# Patient Record
Sex: FEMALE | Race: WHITE | Employment: UNEMPLOYED
[De-identification: names, ages, dates, MRNs, and addresses within clinical notes are randomized per-mention and may not be internally consistent; named-entity substitution may affect disease eponyms.]

---

## 2017-01-26 ENCOUNTER — TELEPHONE (OUTPATIENT)
Dept: FAMILY MEDICINE CLINIC | Facility: CLINIC | Age: 47
End: 2017-01-26

## 2017-02-16 ENCOUNTER — OFFICE VISIT (OUTPATIENT)
Dept: FAMILY MEDICINE CLINIC | Facility: CLINIC | Age: 47
End: 2017-02-16

## 2017-02-16 VITALS
SYSTOLIC BLOOD PRESSURE: 140 MMHG | HEART RATE: 86 BPM | HEIGHT: 63 IN | OXYGEN SATURATION: 97 % | WEIGHT: 169 LBS | BODY MASS INDEX: 29.95 KG/M2 | DIASTOLIC BLOOD PRESSURE: 80 MMHG

## 2017-02-16 DIAGNOSIS — R20.2 NUMBNESS AND TINGLING IN BOTH HANDS: Primary | ICD-10-CM

## 2017-02-16 DIAGNOSIS — R20.0 NUMBNESS AND TINGLING IN BOTH HANDS: Primary | ICD-10-CM

## 2017-02-16 PROCEDURE — 99214 OFFICE O/P EST MOD 30 MIN: CPT | Performed by: FAMILY MEDICINE

## 2017-03-08 LAB
AVERAGE GLUCOSE: NORMAL
HBA1C MFR BLD: 6.21 %

## 2017-03-09 ENCOUNTER — TELEPHONE (OUTPATIENT)
Dept: FAMILY MEDICINE CLINIC | Facility: CLINIC | Age: 47
End: 2017-03-09

## 2017-03-10 DIAGNOSIS — R53.83 FATIGUE, UNSPECIFIED TYPE: Primary | ICD-10-CM

## 2017-03-10 DIAGNOSIS — E66.9 OBESITY (BMI 30-39.9): ICD-10-CM

## 2017-03-10 DIAGNOSIS — R74.8 ELEVATED LIVER ENZYMES: ICD-10-CM

## 2017-03-10 DIAGNOSIS — E11.9 TYPE 2 DIABETES MELLITUS WITHOUT COMPLICATION, UNSPECIFIED LONG TERM INSULIN USE STATUS: ICD-10-CM

## 2017-03-13 LAB
BASOPHILS ABSOLUTE: NORMAL /ΜL
BASOPHILS RELATIVE PERCENT: NORMAL %
EOSINOPHILS ABSOLUTE: NORMAL /ΜL
EOSINOPHILS RELATIVE PERCENT: NORMAL %
HCT VFR BLD CALC: NORMAL % (ref 36–46)
HEMOGLOBIN: NORMAL G/DL (ref 12–16)
LYMPHOCYTES ABSOLUTE: NORMAL /ΜL
LYMPHOCYTES RELATIVE PERCENT: NORMAL %
MCH RBC QN AUTO: NORMAL PG
MCHC RBC AUTO-ENTMCNC: NORMAL G/DL
MCV RBC AUTO: NORMAL FL
MONOCYTES ABSOLUTE: NORMAL /ΜL
MONOCYTES RELATIVE PERCENT: NORMAL %
NEUTROPHILS ABSOLUTE: NORMAL /ΜL
NEUTROPHILS RELATIVE PERCENT: NORMAL %
PLATELET # BLD: NORMAL K/ΜL
PMV BLD AUTO: NORMAL FL
RBC # BLD: NORMAL 10^6/ΜL
WBC # BLD: NORMAL 10^3/ML

## 2017-03-14 ENCOUNTER — TELEPHONE (OUTPATIENT)
Dept: FAMILY MEDICINE CLINIC | Age: 47
End: 2017-03-14

## 2017-03-15 DIAGNOSIS — D50.9 MICROCYTIC HYPOCHROMIC ANEMIA: Primary | ICD-10-CM

## 2017-03-15 RX ORDER — DIAZEPAM 5 MG/1
5 TABLET ORAL ONCE
Qty: 2 TABLET | Refills: 0 | Status: SHIPPED | OUTPATIENT
Start: 2017-03-15 | End: 2017-03-15

## 2017-03-15 RX ORDER — AMOXICILLIN 250 MG/1
250 CAPSULE ORAL 3 TIMES DAILY
Qty: 30 CAPSULE | Refills: 0 | Status: SHIPPED | OUTPATIENT
Start: 2017-03-15 | End: 2017-03-25

## 2017-03-15 RX ORDER — LANOLIN ALCOHOL/MO/W.PET/CERES
325 CREAM (GRAM) TOPICAL 2 TIMES DAILY
Qty: 90 TABLET | Refills: 3 | Status: SHIPPED | OUTPATIENT
Start: 2017-03-15 | End: 2019-05-22 | Stop reason: ALTCHOICE

## 2017-03-15 RX ORDER — AMOXICILLIN 250 MG
2 CAPSULE ORAL DAILY PRN
Qty: 30 TABLET | Refills: 1 | Status: SHIPPED | OUTPATIENT
Start: 2017-03-15 | End: 2017-08-15 | Stop reason: ALTCHOICE

## 2017-03-17 ENCOUNTER — TELEPHONE (OUTPATIENT)
Dept: FAMILY MEDICINE CLINIC | Age: 47
End: 2017-03-17

## 2017-03-20 ENCOUNTER — TELEPHONE (OUTPATIENT)
Dept: FAMILY MEDICINE CLINIC | Age: 47
End: 2017-03-20

## 2017-03-20 DIAGNOSIS — R19.5 POSITIVE FIT (FECAL IMMUNOCHEMICAL TEST): Primary | ICD-10-CM

## 2017-03-28 ENCOUNTER — HOSPITAL ENCOUNTER (OUTPATIENT)
Dept: MRI IMAGING | Facility: CLINIC | Age: 47
Discharge: HOME | End: 2017-03-28
Payer: COMMERCIAL

## 2017-03-28 DIAGNOSIS — R20.2 NUMBNESS AND TINGLING IN BOTH HANDS: ICD-10-CM

## 2017-03-28 DIAGNOSIS — R20.0 NUMBNESS AND TINGLING IN BOTH HANDS: ICD-10-CM

## 2017-03-28 PROCEDURE — 72141 MRI NECK SPINE W/O DYE: CPT

## 2017-03-30 ENCOUNTER — TELEPHONE (OUTPATIENT)
Dept: FAMILY MEDICINE CLINIC | Age: 47
End: 2017-03-30

## 2017-04-10 DIAGNOSIS — D50.0 IRON DEFICIENCY ANEMIA DUE TO CHRONIC BLOOD LOSS: Primary | ICD-10-CM

## 2017-05-12 ENCOUNTER — OFFICE VISIT (OUTPATIENT)
Dept: FAMILY MEDICINE CLINIC | Age: 47
End: 2017-05-12
Payer: COMMERCIAL

## 2017-05-12 VITALS
RESPIRATION RATE: 16 BRPM | SYSTOLIC BLOOD PRESSURE: 110 MMHG | BODY MASS INDEX: 29.77 KG/M2 | OXYGEN SATURATION: 98 % | HEART RATE: 79 BPM | WEIGHT: 168 LBS | HEIGHT: 63 IN | DIASTOLIC BLOOD PRESSURE: 71 MMHG

## 2017-05-12 DIAGNOSIS — R05.8 COUGH WITH SPUTUM: Primary | ICD-10-CM

## 2017-05-12 PROCEDURE — 99213 OFFICE O/P EST LOW 20 MIN: CPT | Performed by: FAMILY MEDICINE

## 2017-05-12 RX ORDER — RANOLAZINE 1000 MG/1
1000 TABLET, EXTENDED RELEASE ORAL
COMMUNITY

## 2017-05-12 RX ORDER — LEVOFLOXACIN 500 MG/1
500 TABLET, FILM COATED ORAL DAILY
Qty: 10 TABLET | Refills: 0 | Status: SHIPPED | OUTPATIENT
Start: 2017-05-12 | End: 2017-05-22

## 2017-05-12 ASSESSMENT — PATIENT HEALTH QUESTIONNAIRE - PHQ9
SUM OF ALL RESPONSES TO PHQ9 QUESTIONS 1 & 2: 2
1. LITTLE INTEREST OR PLEASURE IN DOING THINGS: 1
2. FEELING DOWN, DEPRESSED OR HOPELESS: 1
SUM OF ALL RESPONSES TO PHQ QUESTIONS 1-9: 2

## 2017-06-14 LAB
AVERAGE GLUCOSE: NORMAL
HBA1C MFR BLD: 6.4 %

## 2017-06-23 ENCOUNTER — HOSPITAL ENCOUNTER (OUTPATIENT)
Age: 47
Setting detail: SPECIMEN
Discharge: HOME | End: 2017-06-23
Payer: COMMERCIAL

## 2017-06-23 DIAGNOSIS — D50.0 IRON DEFICIENCY ANEMIA DUE TO CHRONIC BLOOD LOSS: ICD-10-CM

## 2017-06-23 LAB
HCT VFR BLD CALC: 30.6 % (ref 36–46)
HEMOGLOBIN: 10.1 G/DL (ref 12–16)

## 2017-08-08 ENCOUNTER — HOSPITAL ENCOUNTER (OUTPATIENT)
Dept: MRI IMAGING | Facility: CLINIC | Age: 47
Discharge: HOME | End: 2017-08-08

## 2017-08-08 DIAGNOSIS — M51.26 DISPLACEMENT OF LUMBAR INTERVERTEBRAL DISC WITHOUT MYELOPATHY: ICD-10-CM

## 2017-08-08 PROCEDURE — 72148 MRI LUMBAR SPINE W/O DYE: CPT

## 2017-08-15 ENCOUNTER — OFFICE VISIT (OUTPATIENT)
Dept: FAMILY MEDICINE CLINIC | Age: 47
End: 2017-08-15
Payer: COMMERCIAL

## 2017-08-15 VITALS
OXYGEN SATURATION: 98 % | WEIGHT: 169 LBS | HEART RATE: 79 BPM | DIASTOLIC BLOOD PRESSURE: 76 MMHG | BODY MASS INDEX: 29.95 KG/M2 | HEIGHT: 63 IN | SYSTOLIC BLOOD PRESSURE: 123 MMHG

## 2017-08-15 DIAGNOSIS — J01.90 ACUTE SINUSITIS, RECURRENCE NOT SPECIFIED, UNSPECIFIED LOCATION: ICD-10-CM

## 2017-08-15 DIAGNOSIS — L60.8 CHANGE IN NAIL APPEARANCE: Primary | ICD-10-CM

## 2017-08-15 PROCEDURE — 99213 OFFICE O/P EST LOW 20 MIN: CPT | Performed by: FAMILY MEDICINE

## 2017-08-15 RX ORDER — AZITHROMYCIN 250 MG/1
TABLET, FILM COATED ORAL
Qty: 6 TABLET | Refills: 0 | Status: SHIPPED | OUTPATIENT
Start: 2017-08-15 | End: 2017-12-13 | Stop reason: SDUPTHER

## 2017-08-15 RX ORDER — .ALPHA.-TOCOPHEROL ACETATE, DL-, CHOLECALCIFEROL, CYANOCOBALAMIN, FERROUS FUMARATE, FOLIC ACID, NIACINAMIDE, SODIUM ASCORBATE, ASCORBIC ACID, PYRIDOXINE HYDROCHLORIDE, RIBOFLAVIN, AND THIAMINE MONONITRATE 11; 400; 12; 29; 1; 20; 60; 60; 10; 3; 2 [IU]/1; [IU]/1; UG/1; MG/1; MG/1; MG/1; MG/1; MG/1; MG/1; MG/1; MG/1
1 TABLET, CHEWABLE ORAL DAILY
Qty: 30 TABLET | Refills: 3 | Status: SHIPPED | OUTPATIENT
Start: 2017-08-15 | End: 2018-11-20 | Stop reason: ALTCHOICE

## 2017-10-25 DIAGNOSIS — I10 ESSENTIAL HYPERTENSION: ICD-10-CM

## 2017-10-25 RX ORDER — METOPROLOL SUCCINATE 25 MG/1
50 TABLET, EXTENDED RELEASE ORAL DAILY
Qty: 60 TABLET | Refills: 3 | Status: SHIPPED | OUTPATIENT
Start: 2017-10-25

## 2017-12-12 ENCOUNTER — TELEPHONE (OUTPATIENT)
Dept: FAMILY MEDICINE CLINIC | Age: 47
End: 2017-12-12

## 2017-12-12 NOTE — TELEPHONE ENCOUNTER
Pt called in requesting a script be sent in for sinus infection. Has Sinus drainage green and yellow in color. Pain around eyes. HA constantly. Slight temperature/fever last night. Both kids also have right now and are on ABx. Has tried OTC decongestants. Wanted you to be sure you know she is on Ranexa for heart and can only be on certain abx.   [facility] on [employer]

## 2017-12-13 DIAGNOSIS — J01.90 ACUTE SINUSITIS, RECURRENCE NOT SPECIFIED, UNSPECIFIED LOCATION: ICD-10-CM

## 2017-12-13 RX ORDER — AZITHROMYCIN 250 MG/1
TABLET, FILM COATED ORAL
Qty: 6 TABLET | Refills: 0 | Status: SHIPPED | OUTPATIENT
Start: 2017-12-13 | End: 2017-12-23

## 2018-01-11 ENCOUNTER — HOSPITAL ENCOUNTER (OUTPATIENT)
Age: 48
Setting detail: SPECIMEN
Discharge: HOME | End: 2018-01-11
Payer: COMMERCIAL

## 2018-01-16 LAB — SURGICAL PATHOLOGY REPORT: NORMAL

## 2018-02-21 ENCOUNTER — OFFICE VISIT (OUTPATIENT)
Dept: FAMILY MEDICINE CLINIC | Age: 48
End: 2018-02-21
Payer: COMMERCIAL

## 2018-02-21 VITALS — RESPIRATION RATE: 16 BRPM | BODY MASS INDEX: 31.54 KG/M2 | WEIGHT: 178 LBS

## 2018-02-21 DIAGNOSIS — G57.01 PIRIFORMIS SYNDROME, RIGHT: Primary | ICD-10-CM

## 2018-02-21 PROCEDURE — 99213 OFFICE O/P EST LOW 20 MIN: CPT | Performed by: FAMILY MEDICINE

## 2018-02-21 RX ORDER — CYCLOBENZAPRINE HCL 5 MG
5 TABLET ORAL 3 TIMES DAILY PRN
Qty: 30 TABLET | Refills: 0 | Status: SHIPPED | OUTPATIENT
Start: 2018-02-21 | End: 2018-03-03

## 2018-02-21 RX ORDER — HYDROCODONE BITARTRATE AND ACETAMINOPHEN 5; 325 MG/1; MG/1
1 TABLET ORAL 2 TIMES DAILY
Qty: 14 TABLET | Refills: 0 | Status: SHIPPED | OUTPATIENT
Start: 2018-02-21 | End: 2018-02-28

## 2018-02-21 RX ORDER — PANTOPRAZOLE SODIUM 40 MG/1
40 GRANULE, DELAYED RELEASE ORAL DAILY
COMMUNITY

## 2018-02-21 NOTE — PATIENT INSTRUCTIONS
Patient Education        Piriformis Syndrome: Care Instructions  Your Care Instructions    The piriformis muscle is deep under your rear end (buttock). One end of the muscle connects deep inside the pelvic area, and the other end attaches to the top of the thighbone. This muscle can press on the sciatic nerve that runs from your spine down your leg. When this happens, you may have pain, numbness, and tingling in the buttock and down the back of your leg. This is called piriformis syndrome. The pain may get worse when you sit for a long time or climb stairs. Also, you may be more likely to develop piriformis syndrome if you run or walk often. Your doctor will check for other causes of your pain before treating this syndrome. Treatment may include stretching exercises, massage, and medicine for the pain and swelling. If these do not help, you may get a shot of steroid medicine. Until the pain is gone, you may need to rest the muscle and limit activities like running. Exercises and a change in how you move and sit may be enough to stop the pressure on the nerve. Follow-up care is a key part of your treatment and safety. Be sure to make and go to all appointments, and call your doctor if you are having problems. It's also a good idea to know your test results and keep a list of the medicines you take. How can you care for yourself at home? · If your doctor thinks that strenuous exercise is causing your problem, stop or cut back on activities such as running. You may find swimming to be a good exercise for a while. · Stretch the piriformis muscle. Susan Yusuf on your back. ¨ Bend one leg at the knee and keep the other leg flat on the ground. ¨ Raise your bent knee up and then move it across your body. Hold the outside of the knee with the opposite hand. ¨ Gently pull the knee with your hand toward the opposite shoulder. ¨ Hold the stretch for at least 15 to 30 seconds. Switch legs.   ¨ Do the stretch several times

## 2018-02-21 NOTE — PROGRESS NOTES
General FM note    Sidney Connell is a 56 y.o. female who presents today for follow up on her  medical conditions as noted below. Sidney Connell is c/o of   Chief Complaint   Patient presents with    Hip Pain     right hip pain x 1 month. gotten worse.  Hoarse     hoarse voice, slight sore throat x 3-4 days       Patient Active Problem List:     Fatigue     GERD (gastroesophageal reflux disease)     Obesity (BMI 30-39. 9)     Diabetes mellitus ([address]     Snoring     Elevated liver enzymes     Unstable angina (HCC)     Positive cardiac stress test     Anomalous coronary artery origin     Past Medical History:   Diagnosis Date    Anomalous coronary artery origin 2016    Anxiety     Asthma     Back pain     Diabetes mellitus ([address]     Mitral prolapse     Thoracic outlet syndrome       Past Surgical History:   Procedure Laterality Date    ABDOMEN SURGERY      BACK SURGERY       SECTION      x 2    HERNIA REPAIR      TUBAL LIGATION       No family history on file. Current Outpatient Prescriptions   Medication Sig Dispense Refill    pantoprazole sodium (PROTONIX) 40 MG PACK packet Take 40 mg by mouth daily      cyclobenzaprine (FLEXERIL) 5 MG tablet Take 1 tablet by mouth 3 times daily as needed for Muscle spasms 30 tablet 0    HYDROcodone-acetaminophen (NORCO) 5-325 MG per tablet Take 1 tablet by mouth 2 times daily for 7 days . Take lowest dose possible to manage pain.  14 tablet 0    metoprolol succinate (TOPROL XL) 25 MG extended release tablet Take 2 tablets by mouth daily 60 tablet 3    PRENATA 29-1 MG CHEW Take 1 each by mouth daily 30 tablet 3    ranolazine (RANEXA) 1000 MG extended release tablet Take 1,000 mg by mouth      fluticasone-salmeterol (ADVAIR DISKUS) 500-50 MCG/DOSE diskus inhaler Inhale 1 puff into the lungs every 12 hours 60 each 3    ferrous sulfate (FE TABS) 325 (65 FE) MG EC tablet Take 1 tablet by mouth 2 times daily 90 tablet 3    insulin glargine (TOUJEO questions to her and her 's satisfaction. The possible upper respiratory infection was not discussed. Return in about 6 months (around 8/21/2018), or if symptoms worsen or fail to improve, for [facility]. No orders of the defined types were placed in this encounter. Orders Placed This Encounter   Medications    cyclobenzaprine (FLEXERIL) 5 MG tablet     Sig: Take 1 tablet by mouth 3 times daily as needed for Muscle spasms     Dispense:  30 tablet     Refill:  0    HYDROcodone-acetaminophen (NORCO) 5-325 MG per tablet     Sig: Take 1 tablet by mouth 2 times daily for 7 days . Take lowest dose possible to manage pain.      Dispense:  14 tablet     Refill:  0       Electronically signed by Emma Guerra MD on 2/22/2018 at 6:55 AM       (Please note that portions of this note were completed with a voice recognition program. Efforts were made to edit the dictations but occasionally words are mis-transcribed.)

## 2018-02-26 ENCOUNTER — HOSPITAL ENCOUNTER (OUTPATIENT)
Age: 48
Setting detail: SPECIMEN
Discharge: HOME | End: 2018-02-26
Payer: COMMERCIAL

## 2018-03-01 LAB — SURGICAL PATHOLOGY REPORT: NORMAL

## 2018-03-14 ENCOUNTER — OFFICE VISIT (OUTPATIENT)
Dept: FAMILY MEDICINE CLINIC | Age: 48
End: 2018-03-14
Payer: COMMERCIAL

## 2018-03-14 VITALS
DIASTOLIC BLOOD PRESSURE: 78 MMHG | WEIGHT: 179 LBS | SYSTOLIC BLOOD PRESSURE: 129 MMHG | BODY MASS INDEX: 31.71 KG/M2 | HEART RATE: 73 BPM | HEIGHT: 63 IN

## 2018-03-14 DIAGNOSIS — M25.50 ARTHRALGIA, UNSPECIFIED JOINT: ICD-10-CM

## 2018-03-14 DIAGNOSIS — G57.32 PERONEAL NERVE PALSY, LEFT: Primary | ICD-10-CM

## 2018-03-14 PROCEDURE — 99213 OFFICE O/P EST LOW 20 MIN: CPT | Performed by: FAMILY MEDICINE

## 2018-03-14 RX ORDER — AMOXICILLIN 250 MG/1
CAPSULE ORAL
COMMUNITY
Start: 2018-03-05 | End: 2018-11-20 | Stop reason: ALTCHOICE

## 2018-03-14 NOTE — PROGRESS NOTES
General FM note    Brina Hutchison is a 56 y.o. female who presents today for follow up on her  medical conditions as noted below. Brina Hutchison is c/o of   Chief Complaint   Patient presents with    Numbness     Left foot between 1st and 2nd metatarsal and up the top.  Pain     Experiencing more back pain since having the foot numbness and left hip pain. Patient Active Problem List:     Fatigue     GERD (gastroesophageal reflux disease)     Obesity (BMI 30-39. 9)     Diabetes mellitus ([address]     Snoring     Elevated liver enzymes     Unstable angina (HCC)     Positive cardiac stress test     Anomalous coronary artery origin     Past Medical History:   Diagnosis Date    Anomalous coronary artery origin 2016    Anxiety     Asthma     Back pain     Diabetes mellitus ([address]     Mitral prolapse     Thoracic outlet syndrome       Past Surgical History:   Procedure Laterality Date    ABDOMEN SURGERY      BACK SURGERY       SECTION      x 2    HERNIA REPAIR      TUBAL LIGATION       History reviewed. No pertinent family history.   Current Outpatient Prescriptions   Medication Sig Dispense Refill    pantoprazole sodium (PROTONIX) 40 MG PACK packet Take 40 mg by mouth daily      metoprolol succinate (TOPROL XL) 25 MG extended release tablet Take 2 tablets by mouth daily 60 tablet 3    PRENATA 29-1 MG CHEW Take 1 each by mouth daily 30 tablet 3    ranolazine (RANEXA) 1000 MG extended release tablet Take 1,000 mg by mouth      fluticasone-salmeterol (ADVAIR DISKUS) 500-50 MCG/DOSE diskus inhaler Inhale 1 puff into the lungs every 12 hours 60 each 3    ferrous sulfate (FE TABS) 325 (65 FE) MG EC tablet Take 1 tablet by mouth 2 times daily 90 tablet 3    insulin glargine (TOUJEO SOLOSTAR) 300 UNIT/ML injection pen Inject 20 Units into the skin daily       nitroGLYCERIN (NITROSTAT) 0.4 MG SL tablet Place 0.4 mg under the tongue every 5 minutes as needed for Chest pain Dissolve 1 tab under anticipated. I have reviewed the instructions with the patient, answering all questions to her satisfaction. Return in about 6 months (around 9/14/2018), or if symptoms worsen or fail to improve. Orders Placed This Encounter   Procedures    Lupus Anticoagulant     Standing Status:   Future     Standing Expiration Date:   3/14/2019   Lia Rosenberg MD, Neurology [place]     Referral Priority:   Routine     Referral Type:   Consult for Advice and Opinion     Referral Reason:   Specialty Services Required     Referred to Provider:   Deb Santiago MD     Requested Specialty:   Neurology     Number of Visits Requested:   1    [facility] MD     Referral Priority:   Routine     Referral Type:   Consult for Advice and Opinion     Referral Reason:   Specialty Services Required     Referred to Provider:   Lynn Peres MD     Requested Specialty:   Rheumatology     Number of Visits Requested:   1     No orders of the defined types were placed in this encounter.       Electronically signed by Isabel Duenas MD on 3/15/2018 at 6:30 AM       (Please note that portions of this note were completed with a voice recognition program. Efforts were made to edit the dictations but occasionally words are mis-transcribed.)

## 2018-04-19 ENCOUNTER — HOSPITAL ENCOUNTER (EMERGENCY)
Facility: CLINIC | Age: 48
Discharge: HOME | End: 2018-04-19
Attending: EMERGENCY MEDICINE
Payer: COMMERCIAL

## 2018-04-19 VITALS
HEIGHT: 63 IN | SYSTOLIC BLOOD PRESSURE: 150 MMHG | DIASTOLIC BLOOD PRESSURE: 104 MMHG | RESPIRATION RATE: 16 BRPM | BODY MASS INDEX: 31.18 KG/M2 | WEIGHT: 176 LBS | HEART RATE: 86 BPM | TEMPERATURE: 98.1 F | OXYGEN SATURATION: 98 %

## 2018-04-19 DIAGNOSIS — K12.1 ORAL ULCER: Primary | ICD-10-CM

## 2018-04-19 LAB
ABSOLUTE EOS #: 0.3 K/UL (ref 0–0.4)
ABSOLUTE IMMATURE GRANULOCYTE: ABNORMAL K/UL (ref 0–0.3)
ABSOLUTE LYMPH #: 2 K/UL (ref 1–4.8)
ABSOLUTE MONO #: 0.5 K/UL (ref 0.1–1.2)
ANION GAP SERPL CALCULATED.3IONS-SCNC: 13 MMOL/L (ref 9–17)
BASOPHILS # BLD: 1 % (ref 0–2)
BASOPHILS ABSOLUTE: 0.1 K/UL (ref 0–0.2)
BUN BLDV-MCNC: 6 MG/DL (ref 6–20)
BUN/CREAT BLD: ABNORMAL (ref 9–20)
CALCIUM SERPL-MCNC: 9.5 MG/DL (ref 8.6–10.4)
CHLORIDE BLD-SCNC: 102 MMOL/L (ref 98–107)
CO2: 24 MMOL/L (ref 20–31)
CREAT SERPL-MCNC: 0.7 MG/DL (ref 0.5–0.9)
DIFFERENTIAL TYPE: ABNORMAL
EOSINOPHILS RELATIVE PERCENT: 4 % (ref 1–4)
GFR AFRICAN AMERICAN: >60 ML/MIN
GFR NON-AFRICAN AMERICAN: >60 ML/MIN
GFR SERPL CREATININE-BSD FRML MDRD: ABNORMAL ML/MIN/{1.73_M2}
GFR SERPL CREATININE-BSD FRML MDRD: ABNORMAL ML/MIN/{1.73_M2}
GLUCOSE BLD-MCNC: 113 MG/DL (ref 70–99)
HCT VFR BLD CALC: 39.4 % (ref 36–46)
HEMOGLOBIN: 13 G/DL (ref 12–16)
IMMATURE GRANULOCYTES: ABNORMAL %
INR BLD: 1
LYMPHOCYTES # BLD: 34 % (ref 24–44)
MCH RBC QN AUTO: 28.1 PG (ref 26–34)
MCHC RBC AUTO-ENTMCNC: 32.8 G/DL (ref 31–37)
MCV RBC AUTO: 85.5 FL (ref 80–100)
MONOCYTES # BLD: 8 % (ref 2–11)
NRBC AUTOMATED: ABNORMAL PER 100 WBC
PARTIAL THROMBOPLASTIN TIME: 24.1 SEC (ref 21.3–31.3)
PDW BLD-RTO: 16.9 % (ref 12.5–15.4)
PLATELET # BLD: 140 K/UL (ref 140–450)
PLATELET ESTIMATE: ABNORMAL
PMV BLD AUTO: 8.7 FL (ref 6–12)
POTASSIUM SERPL-SCNC: 4.1 MMOL/L (ref 3.7–5.3)
PROTHROMBIN TIME: 10 SEC (ref 9.4–12.6)
RBC # BLD: 4.61 M/UL (ref 4–5.2)
RBC # BLD: ABNORMAL 10*6/UL
SEG NEUTROPHILS: 53 % (ref 36–66)
SEGMENTED NEUTROPHILS ABSOLUTE COUNT: 3.2 K/UL (ref 1.8–7.7)
SODIUM BLD-SCNC: 139 MMOL/L (ref 135–144)
WBC # BLD: 6 K/UL (ref 3.5–11)
WBC # BLD: ABNORMAL 10*3/UL

## 2018-04-19 PROCEDURE — 99283 EMERGENCY DEPT VISIT LOW MDM: CPT

## 2018-04-19 PROCEDURE — 85025 COMPLETE CBC W/AUTO DIFF WBC: CPT

## 2018-04-19 PROCEDURE — 36415 COLL VENOUS BLD VENIPUNCTURE: CPT

## 2018-04-19 PROCEDURE — 80048 BASIC METABOLIC PNL TOTAL CA: CPT

## 2018-04-19 PROCEDURE — 85730 THROMBOPLASTIN TIME PARTIAL: CPT

## 2018-04-19 PROCEDURE — 85610 PROTHROMBIN TIME: CPT

## 2018-04-19 RX ORDER — LORAZEPAM 2 MG/ML
1 INJECTION INTRAMUSCULAR ONCE
Status: DISCONTINUED | OUTPATIENT
Start: 2018-04-19 | End: 2018-04-19

## 2018-04-19 ASSESSMENT — ENCOUNTER SYMPTOMS
COUGH: 0
ABDOMINAL PAIN: 0
VOMITING: 0
EYE PAIN: 0
DIARRHEA: 0
BACK PAIN: 0
BLOOD IN STOOL: 0
NAUSEA: 0
CONSTIPATION: 0
SHORTNESS OF BREATH: 0

## 2018-05-03 DIAGNOSIS — M25.50 ARTHRALGIA, UNSPECIFIED JOINT: ICD-10-CM

## 2018-05-10 ENCOUNTER — OFFICE VISIT (OUTPATIENT)
Dept: FAMILY MEDICINE CLINIC | Age: 48
End: 2018-05-10
Payer: COMMERCIAL

## 2018-05-10 VITALS
HEART RATE: 83 BPM | WEIGHT: 180.4 LBS | RESPIRATION RATE: 16 BRPM | OXYGEN SATURATION: 95 % | DIASTOLIC BLOOD PRESSURE: 85 MMHG | BODY MASS INDEX: 31.96 KG/M2 | SYSTOLIC BLOOD PRESSURE: 136 MMHG

## 2018-05-10 DIAGNOSIS — F41.9 ANXIETY: Primary | ICD-10-CM

## 2018-05-10 PROCEDURE — 99213 OFFICE O/P EST LOW 20 MIN: CPT | Performed by: FAMILY MEDICINE

## 2018-05-10 RX ORDER — DIAZEPAM 2 MG/1
2 TABLET ORAL NIGHTLY PRN
Qty: 6 TABLET | Refills: 0 | Status: SHIPPED | OUTPATIENT
Start: 2018-05-10 | End: 2018-05-16

## 2018-06-14 ENCOUNTER — OFFICE VISIT (OUTPATIENT)
Dept: FAMILY MEDICINE CLINIC | Age: 48
End: 2018-06-14
Payer: COMMERCIAL

## 2018-06-14 VITALS
OXYGEN SATURATION: 96 % | HEART RATE: 79 BPM | DIASTOLIC BLOOD PRESSURE: 76 MMHG | SYSTOLIC BLOOD PRESSURE: 128 MMHG | WEIGHT: 176 LBS | BODY MASS INDEX: 31.18 KG/M2

## 2018-06-14 DIAGNOSIS — L03.012 PARONYCHIA OF THUMB, LEFT: Primary | ICD-10-CM

## 2018-06-14 DIAGNOSIS — M79.671 RIGHT FOOT PAIN: ICD-10-CM

## 2018-06-14 PROCEDURE — 99213 OFFICE O/P EST LOW 20 MIN: CPT | Performed by: FAMILY MEDICINE

## 2018-06-14 ASSESSMENT — PATIENT HEALTH QUESTIONNAIRE - PHQ9
SUM OF ALL RESPONSES TO PHQ QUESTIONS 1-9: 0
2. FEELING DOWN, DEPRESSED OR HOPELESS: 0
SUM OF ALL RESPONSES TO PHQ9 QUESTIONS 1 & 2: 0
1. LITTLE INTEREST OR PLEASURE IN DOING THINGS: 0

## 2018-06-26 ENCOUNTER — OFFICE VISIT (OUTPATIENT)
Dept: OBGYN CLINIC | Age: 48
End: 2018-06-26
Payer: COMMERCIAL

## 2018-06-26 VITALS
SYSTOLIC BLOOD PRESSURE: 154 MMHG | DIASTOLIC BLOOD PRESSURE: 85 MMHG | HEART RATE: 83 BPM | BODY MASS INDEX: 30.05 KG/M2 | WEIGHT: 176 LBS | HEIGHT: 64 IN

## 2018-06-26 DIAGNOSIS — N93.9 ABNORMAL UTERINE BLEEDING (AUB): Primary | ICD-10-CM

## 2018-06-26 DIAGNOSIS — N92.1 MENORRHAGIA WITH IRREGULAR CYCLE: ICD-10-CM

## 2018-06-26 DIAGNOSIS — Z98.891 HISTORY OF CLASSICAL CESAREAN SECTION: ICD-10-CM

## 2018-06-26 PROCEDURE — 99203 OFFICE O/P NEW LOW 30 MIN: CPT | Performed by: OBSTETRICS & GYNECOLOGY

## 2018-06-26 ASSESSMENT — ENCOUNTER SYMPTOMS
SHORTNESS OF BREATH: 0
BACK PAIN: 0
COUGH: 0
ABDOMINAL PAIN: 0

## 2018-07-03 ENCOUNTER — TELEPHONE (OUTPATIENT)
Dept: FAMILY MEDICINE CLINIC | Age: 48
End: 2018-07-03

## 2018-07-05 ENCOUNTER — NURSE ONLY (OUTPATIENT)
Dept: FAMILY MEDICINE CLINIC | Age: 48
End: 2018-07-05
Payer: COMMERCIAL

## 2018-07-05 DIAGNOSIS — R35.0 URINARY FREQUENCY: Primary | ICD-10-CM

## 2018-07-05 LAB
BILIRUBIN, POC: NEGATIVE
BLOOD URINE, POC: ABNORMAL
CLARITY, POC: CLEAR
COLOR, POC: ABNORMAL
GLUCOSE URINE, POC: NEGATIVE
KETONES, POC: NEGATIVE
LEUKOCYTE EST, POC: ABNORMAL
NITRITE, POC: NEGATIVE
PH, POC: 5
PROTEIN, POC: NEGATIVE
SPECIFIC GRAVITY, POC: 1.03
UROBILINOGEN, POC: 0.2

## 2018-07-05 PROCEDURE — 81002 URINALYSIS NONAUTO W/O SCOPE: CPT | Performed by: FAMILY MEDICINE

## 2018-07-09 DIAGNOSIS — R35.0 URINARY FREQUENCY: ICD-10-CM

## 2018-08-31 LAB
AVERAGE GLUCOSE: NORMAL
HBA1C MFR BLD: 6.7 %

## 2018-09-17 ENCOUNTER — TELEPHONE (OUTPATIENT)
Dept: FAMILY MEDICINE CLINIC | Age: 48
End: 2018-09-17

## 2018-10-22 ENCOUNTER — OFFICE VISIT (OUTPATIENT)
Dept: FAMILY MEDICINE CLINIC | Age: 48
End: 2018-10-22
Payer: COMMERCIAL

## 2018-10-22 VITALS
WEIGHT: 181 LBS | HEIGHT: 64 IN | BODY MASS INDEX: 30.9 KG/M2 | OXYGEN SATURATION: 97 % | RESPIRATION RATE: 14 BRPM | HEART RATE: 75 BPM | DIASTOLIC BLOOD PRESSURE: 76 MMHG | SYSTOLIC BLOOD PRESSURE: 119 MMHG | TEMPERATURE: 97 F

## 2018-10-22 DIAGNOSIS — L73.9 FOLLICULITIS: ICD-10-CM

## 2018-10-22 DIAGNOSIS — K13.0 ANGULAR CHEILITIS: Primary | ICD-10-CM

## 2018-10-22 PROCEDURE — 99214 OFFICE O/P EST MOD 30 MIN: CPT | Performed by: FAMILY MEDICINE

## 2018-10-22 RX ORDER — DOXYCYCLINE HYCLATE 100 MG/1
100 CAPSULE ORAL 2 TIMES DAILY
Qty: 14 CAPSULE | Refills: 0 | Status: SHIPPED | OUTPATIENT
Start: 2018-10-22 | End: 2018-10-29

## 2018-10-22 NOTE — PROGRESS NOTES
General FM note    Anastasia Cope is a 53 y.o. female who presents today for follow up on her  medical conditions as noted below. Anastasia Cope is c/o of   Chief Complaint   Patient presents with   [facility] Other     thinks she has infection on scalp on head very tender and sore     Mouth Lesions     corners of mouth       Patient Active Problem List:     Fatigue     GERD (gastroesophageal reflux disease)     Obesity (BMI 30-39. 9)     Diabetes mellitus ([address]     Snoring     Elevated liver enzymes     Unstable angina (HCC)     Positive cardiac stress test     Anomalous coronary artery origin     Past Medical History:   Diagnosis Date    Anomalous coronary artery origin 2016    Anxiety     Asthma     Back pain     Diabetes mellitus ([address]     Mitral prolapse     Thoracic outlet syndrome       Past Surgical History:   Procedure Laterality Date    ABDOMEN SURGERY      BACK SURGERY       SECTION      x 2    HERNIA REPAIR      TUBAL LIGATION       Family History   Problem Relation Age of Onset    Diabetes Mother     Diabetes Paternal Grandmother      Current Outpatient Prescriptions   Medication Sig Dispense Refill    doxycycline hyclate (VIBRAMYCIN) 100 MG capsule Take 1 capsule by mouth 2 times daily for 7 days 14 capsule 0    SITagliptin (JANUVIA) 25 MG tablet Take 25 mg by mouth daily      pantoprazole sodium (PROTONIX) 40 MG PACK packet Take 40 mg by mouth daily      ranolazine (RANEXA) 1000 MG extended release tablet Take 1,000 mg by mouth      insulin glargine (TOUJEO SOLOSTAR) 300 UNIT/ML injection pen Inject 20 Units into the skin daily       nitroGLYCERIN (NITROSTAT) 0.4 MG SL tablet Place 0.4 mg under the tongue every 5 minutes as needed for Chest pain Dissolve 1 tab under tongue at first sign of chest pain. May repeat every 5 minutes until relief is obtained.  If pain persists after taking 3 tabs in a 15-minute period, or the pain is different than is typically experienced, call 119/76   Pulse 75   Temp 97 °F (36.1 °C) (Oral)   Resp 14   Ht 5' 3.5\" (1.613 m)   Wt 181 lb (82.1 kg)   SpO2 97%   BMI 31.56 kg/m²     Subjective:      HPI     53-year-old female coming today with multiple complaints. She states over last week she started to have a little bump at her right area of the R posterior head. This got more so sore and now she is not even able to sleep on this area and the pain feels like going down her right neck area. she is not really able to move her head. This pain and pressure, but also there was some drainage, when she patrick her hair. Very, very tender. She used some over the counter topical medication which did not help. She also feels some congestion in her ears. She is also concerned about the recurrent infection at. mouth corners. She has been using topical but this infection comes back and she feels very sore at these areas. She also tells me that there is burning some sort of nerve pain at her right lower mouth corner. Review of Systems   Constitutional: Negative for fever and unexpected weight change. Respiratory: Negative for cough and shortness of breath. Cardiovascular: Negative for chest pain and leg swelling. Gastrointestinal: Negative for diarrhea, constipation and blood in stool. Skin: Negative for color change and rash. positive for infection at her scalp area. Positive for infection at mouth corners. Objective:   Physical Exam  Constitutional: VS (see above). General appearance: normal development, habitus and attention, no deformities. No distress. Eyes: normal conjunctiva and lids. CAV: RRR, no RMG. No edema lower extremities. Pulmo: CTA bilateral, no CWR. Skin: no rashes, lesions or ulcers. Physical Exam   HENT:   Head:       Mouth/Throat:       Open sores with slight drainage present. Musculoskeletal: normal gait. Nails: no clubbing or cyanosis. Psychiatric: alert and oriented to place, time and person.  Normal mood and affect. Assessment:       Diagnosis Orders   1. Angular cheilitis  Rober Downey MD, Dermatology [facility]   2. Folliculitis  doxycycline hyclate (VIBRAMYCIN) 100 MG capsule       Plan:   See dermatologist.  Yair Brink start the patient on a antibiotic. Call or return to clinic prn if these symptoms worsen or fail to improve as anticipated. I have reviewed the instructions with the patient, answering all questions to her satisfaction. Return in about 6 months (around 4/22/2019), or if symptoms worsen or fail to improve.   Orders Placed This Encounter   Procedures   Rober Downey MD, Dermatology [facility]     Referral Priority:   Routine     Referral Type:   Eval and Treat     Referral Reason:   Specialty Services Required     Referred to Provider:   Munir Munroe MD     Requested Specialty:   Dermatology     Number of Visits Requested:   1     Orders Placed This Encounter   Medications    doxycycline hyclate (VIBRAMYCIN) 100 MG capsule     Sig: Take 1 capsule by mouth 2 times daily for 7 days     Dispense:  14 capsule     Refill:  0       Electronically signed by Ruchi Ma MD on 10/22/2018 at 5:09 PM       (Please note that portions of this note were completed with a voice recognition program. Efforts were made to edit the dictations but occasionally words are mis-transcribed.)

## 2018-11-20 ENCOUNTER — OFFICE VISIT (OUTPATIENT)
Dept: DERMATOLOGY | Age: 48
End: 2018-11-20
Payer: COMMERCIAL

## 2018-11-20 VITALS
SYSTOLIC BLOOD PRESSURE: 151 MMHG | BODY MASS INDEX: 31.68 KG/M2 | HEIGHT: 63 IN | DIASTOLIC BLOOD PRESSURE: 88 MMHG | HEART RATE: 76 BPM | OXYGEN SATURATION: 96 % | WEIGHT: 178.8 LBS

## 2018-11-20 DIAGNOSIS — L30.9 HAND DERMATITIS: ICD-10-CM

## 2018-11-20 DIAGNOSIS — L82.0 INFLAMED SEBORRHEIC KERATOSIS: ICD-10-CM

## 2018-11-20 DIAGNOSIS — L60.8 LEUKONYCHIA: Primary | ICD-10-CM

## 2018-11-20 DIAGNOSIS — K13.0 PERLECHE: ICD-10-CM

## 2018-11-20 DIAGNOSIS — L03.012: ICD-10-CM

## 2018-11-20 PROCEDURE — 99203 OFFICE O/P NEW LOW 30 MIN: CPT | Performed by: DERMATOLOGY

## 2018-11-20 PROCEDURE — 17110 DESTRUCTION B9 LES UP TO 14: CPT | Performed by: DERMATOLOGY

## 2018-11-20 RX ORDER — KETOCONAZOLE 20 MG/G
CREAM TOPICAL
Qty: 30 G | Refills: 2 | Status: SHIPPED | OUTPATIENT
Start: 2018-11-20 | End: 2019-05-22 | Stop reason: ALTCHOICE

## 2018-11-20 RX ORDER — LEVOTHYROXINE SODIUM 0.03 MG/1
TABLET ORAL
COMMUNITY
Start: 2018-09-28 | End: 2019-05-22 | Stop reason: ALTCHOICE

## 2018-11-20 RX ORDER — MEDROXYPROGESTERONE ACETATE 10 MG/1
10 TABLET ORAL PRN
COMMUNITY
Start: 2018-10-09 | End: 2022-02-18

## 2018-11-20 RX ORDER — IBUPROFEN 600 MG/1
600 TABLET ORAL
COMMUNITY
End: 2018-12-11 | Stop reason: SDUPTHER

## 2018-11-20 RX ORDER — KETOCONAZOLE 20 MG/G
CREAM TOPICAL
Qty: 30 G | Refills: 2 | Status: SHIPPED | OUTPATIENT
Start: 2018-11-20 | End: 2018-11-20 | Stop reason: SDUPTHER

## 2018-11-20 NOTE — LETTER
[address]  Dermatology   [facility]  [address]  [address] [address]  Phone: [phone]  Fax: [phone]     Lawanda Bañuelos MD       November 20, 2018     [address]  [facility], [facility]     Patient: Luan Castillo   MR Number: [id number]   YOB: 1970   Date of Visit: 11/20/2018     Dear Dr. Noe Matthews:    Thank you for the request for consultation for Ms. Leigha Rai to me for evaluation. Below are the relevant portions of my assessment and plan of care. 1. Leukonychia, most consistent with Kalia's nails clinically which can be related to underlying liver>kidney disease. Questionable history of MOLINA and persistent mild transaminitis in 2015/2016, no recent labs  - Comprehensive Metabolic Panel; Future    2. Perleche  - patient is also interested in cosmetic treatments, explained that filler may help with her marionette folds and improve perleche  - ketoconazole (NIZORAL) 2 % cream; Apply qam to corners of mouth and base of fingernail  Dispense: 30 g; Refill: 2  - hydrocortisone 2.5 % cream; Apply qpm to corners of mouth and to base of fingernail  Dispense: 60 g; Refill: 5    3. 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 Hand dermatitis  - hand care handout provided. Reduce handwashing frequency  - triamcinolone (KENALOG) 0.1 % ointment; Apply to rash on hands twice daily  Dispense: 80 g; Refill: 1    5. Inflamed seborrheic keratosis, right forearm  6.  Prurigo/ISK of posterior scalp

## 2018-12-01 LAB
ALBUMIN SERPL-MCNC: 4.6 G/DL (ref 3.5–5.2)
ALK PHOSPHATASE: 67 U/L (ref 30–103)
ALT SERPL-CCNC: 33 U/L (ref 5–40)
ANION GAP SERPL CALCULATED.3IONS-SCNC: 11 MEQ/L (ref 10–19)
AST SERPL-CCNC: 39 U/L (ref 9–40)
BILIRUB SERPL-MCNC: 0.4 MG/DL
BUN BLDV-MCNC: 9 MG/DL (ref 8–23)
CALCIUM SERPL-MCNC: 9.6 MG/DL (ref 8.5–10.5)
CHLORIDE BLD-SCNC: 103 MEQ/L (ref 95–107)
CO2: 27 MEQ/L (ref 19–31)
CREAT SERPL-MCNC: 1 MG/DL (ref 0.6–1.3)
EGFR AFRICAN AMERICAN: 77.7 ML/MIN/1.73 M2
EGFR IF NONAFRICAN AMERICAN: 67 ML/MIN/1.73 M2
GLUCOSE: 234 MG/DL (ref 70–99)
POTASSIUM SERPL-SCNC: 4.2 MEQ/L (ref 3.5–5.4)
SODIUM BLD-SCNC: 141 MEQ/L (ref 135–146)
TOTAL PROTEIN: 7.1 G/DL (ref 6.1–8.3)

## 2018-12-04 ENCOUNTER — TELEPHONE (OUTPATIENT)
Dept: DERMATOLOGY | Age: 48
End: 2018-12-04

## 2018-12-04 DIAGNOSIS — L60.8 LEUKONYCHIA: ICD-10-CM

## 2018-12-11 ENCOUNTER — OFFICE VISIT (OUTPATIENT)
Dept: FAMILY MEDICINE CLINIC | Age: 48
End: 2018-12-11
Payer: COMMERCIAL

## 2018-12-11 VITALS
WEIGHT: 177 LBS | BODY MASS INDEX: 31.35 KG/M2 | HEART RATE: 77 BPM | SYSTOLIC BLOOD PRESSURE: 122 MMHG | DIASTOLIC BLOOD PRESSURE: 76 MMHG

## 2018-12-11 DIAGNOSIS — M67.911 ROTATOR CUFF DYSFUNCTION, RIGHT: Primary | ICD-10-CM

## 2018-12-11 DIAGNOSIS — Z23 NEED FOR INFLUENZA VACCINATION: ICD-10-CM

## 2018-12-11 DIAGNOSIS — Z23 NEED FOR TDAP VACCINATION: ICD-10-CM

## 2018-12-11 DIAGNOSIS — M65.311 TRIGGER FINGER OF RIGHT THUMB: ICD-10-CM

## 2018-12-11 PROCEDURE — 90472 IMMUNIZATION ADMIN EACH ADD: CPT | Performed by: FAMILY MEDICINE

## 2018-12-11 PROCEDURE — 90715 TDAP VACCINE 7 YRS/> IM: CPT | Performed by: FAMILY MEDICINE

## 2018-12-11 PROCEDURE — 99213 OFFICE O/P EST LOW 20 MIN: CPT | Performed by: FAMILY MEDICINE

## 2018-12-11 PROCEDURE — 90471 IMMUNIZATION ADMIN: CPT | Performed by: FAMILY MEDICINE

## 2018-12-11 PROCEDURE — 90686 IIV4 VACC NO PRSV 0.5 ML IM: CPT | Performed by: FAMILY MEDICINE

## 2018-12-11 RX ORDER — HYDROCODONE BITARTRATE AND ACETAMINOPHEN 5; 325 MG/1; MG/1
1 TABLET ORAL EVERY 6 HOURS PRN
Qty: 10 TABLET | Refills: 0 | Status: SHIPPED | OUTPATIENT
Start: 2018-12-11 | End: 2018-12-14

## 2018-12-11 RX ORDER — IBUPROFEN 800 MG/1
800 TABLET ORAL EVERY 8 HOURS PRN
Qty: 90 TABLET | Refills: 6 | Status: SHIPPED | OUTPATIENT
Start: 2018-12-11 | End: 2021-01-25 | Stop reason: SDUPTHER

## 2018-12-11 NOTE — PROGRESS NOTES
 pantoprazole sodium (PROTONIX) 40 MG PACK packet Take 40 mg by mouth daily      metoprolol succinate (TOPROL XL) 25 MG extended release tablet Take 2 tablets by mouth daily 60 tablet 3    ranolazine (RANEXA) 1000 MG extended release tablet Take 1,000 mg by mouth      fluticasone-salmeterol (ADVAIR DISKUS) 500-50 MCG/DOSE diskus inhaler Inhale 1 puff into the lungs every 12 hours 60 each 3    ferrous sulfate (FE TABS) 325 (65 FE) MG EC tablet Take 1 tablet by mouth 2 times daily 90 tablet 3    insulin glargine (TOUJEO SOLOSTAR) 300 UNIT/ML injection pen Inject 20 Units into the skin daily       nitroGLYCERIN (NITROSTAT) 0.4 MG SL tablet Place 0.4 mg under the tongue every 5 minutes as needed for Chest pain Dissolve 1 tab under tongue at first sign of chest pain. May repeat every 5 minutes until relief is obtained. If pain persists after taking 3 tabs in a 15-minute period, or the pain is different than is typically experienced, call 9-1-1 immediately.  esomeprazole (NEXIUM) 40 MG delayed release capsule Take 1 capsule by mouth daily 30 capsule 3    clonazePAM (KLONOPIN) 0.5 MG tablet Take 1 tablet by mouth 2 times daily as needed for Anxiety 20 tablet 0    TRUETEST TEST strip   0    TRUEPLUS LANCETS 33G MISC   0    albuterol (PROVENTIL) (2.5 MG/3ML) 0.083% nebulizer solution Take 3 mLs by nebulization every 6 hours as needed for Wheezing 120 each 3    albuterol (PROVENTIL HFA;VENTOLIN HFA) 108 (90 BASE) MCG/ACT inhaler Inhale 2 puffs into the lungs every 6 hours as needed. 1 Inhaler 3     No current facility-administered medications for this visit.       Facility-Administered Medications Ordered in Other Visits   Medication Dose Route Frequency Provider Last Rate Last Dose    nitroGLYCERIN (NITROSTAT) SL tablet 0.4 mg  0.4 mg Sublingual Q5 Min PRN Crissy Pulido MD   0.4 mg at 09/16/16 9637    0.9 % sodium chloride infusion   Intravenous Continuous Crissy Pulido MD 20 mL/hr at 09/16/16 9008 Rotator cuff dysfunction, right  [facility]    ibuprofen (ADVIL;MOTRIN) 800 MG tablet    HYDROcodone-acetaminophen (NORCO) 5-325 MG per tablet   2. Trigger finger of right thumb  [facility]   3. Need for Tdap vaccination     4. Need for influenza vaccination         Plan:   Call or return to clinic prn if these symptoms worsen or fail to improve as anticipated. I have reviewed the instructions with the patient, answering all questions to her satisfaction. Return in about 6 months (around 6/11/2019), or if symptoms worsen or fail to improve. Orders Placed This Encounter   Procedures   [facility]     Referral Priority:   Routine     Referral Type:   Consult for Advice and Opinion     Referral Reason:   Specialty Services Required     Referred to Provider:   Vishnu Turcios MD     Requested Specialty:   Orthopedic Surgery     Number of Visits Requested:   1     Orders Placed This Encounter   Medications    ibuprofen (ADVIL;MOTRIN) 800 MG tablet     Sig: Take 1 tablet by mouth every 8 hours as needed for Pain     Dispense:  90 tablet     Refill:  6    HYDROcodone-acetaminophen (NORCO) 5-325 MG per tablet     Sig: Take 1 tablet by mouth every 6 hours as needed for Pain for up to 3 days. Intended supply: 3 days. Take lowest dose possible to manage pain.      Dispense:  10 tablet     Refill:  0       Electronically signed by Matilde Barnes MD on 12/11/2018 at 1:57 PM       (Please note that portions of this note were completed with a voice recognition program. Efforts were made to edit the dictations but occasionally words are mis-transcribed.)

## 2018-12-21 ENCOUNTER — OFFICE VISIT (OUTPATIENT)
Dept: ORTHOPEDIC SURGERY | Age: 48
End: 2018-12-21
Payer: COMMERCIAL

## 2018-12-21 VITALS — WEIGHT: 176 LBS | HEIGHT: 63 IN | BODY MASS INDEX: 31.18 KG/M2

## 2018-12-21 DIAGNOSIS — M65.311 TRIGGER THUMB OF RIGHT HAND: ICD-10-CM

## 2018-12-21 DIAGNOSIS — G89.29 CHRONIC RIGHT SHOULDER PAIN: Primary | ICD-10-CM

## 2018-12-21 DIAGNOSIS — M25.511 CHRONIC RIGHT SHOULDER PAIN: Primary | ICD-10-CM

## 2018-12-21 PROCEDURE — 99203 OFFICE O/P NEW LOW 30 MIN: CPT | Performed by: ORTHOPAEDIC SURGERY

## 2018-12-21 PROCEDURE — 20550 NJX 1 TENDON SHEATH/LIGAMENT: CPT | Performed by: ORTHOPAEDIC SURGERY

## 2018-12-21 RX ORDER — DICLOFENAC SODIUM 75 MG/1
75 TABLET, DELAYED RELEASE ORAL 2 TIMES DAILY WITH MEALS
Qty: 28 TABLET | Refills: 0 | Status: SHIPPED | OUTPATIENT
Start: 2018-12-21 | End: 2019-02-08 | Stop reason: SDUPTHER

## 2019-01-03 ENCOUNTER — TELEPHONE (OUTPATIENT)
Dept: FAMILY MEDICINE CLINIC | Age: 49
End: 2019-01-03

## 2019-01-03 RX ORDER — LIDOCAINE HYDROCHLORIDE 10 MG/ML
5 INJECTION, SOLUTION INFILTRATION; PERINEURAL ONCE
Status: COMPLETED | OUTPATIENT
Start: 2019-01-03 | End: 2019-01-03

## 2019-01-03 RX ORDER — TRIAMCINOLONE ACETONIDE 40 MG/ML
40 INJECTION, SUSPENSION INTRA-ARTICULAR; INTRAMUSCULAR ONCE
Status: COMPLETED | OUTPATIENT
Start: 2019-01-03 | End: 2019-01-03

## 2019-01-03 RX ADMIN — TRIAMCINOLONE ACETONIDE 40 MG: 40 INJECTION, SUSPENSION INTRA-ARTICULAR; INTRAMUSCULAR at 14:59

## 2019-01-03 RX ADMIN — LIDOCAINE HYDROCHLORIDE 5 ML: 10 INJECTION, SOLUTION INFILTRATION; PERINEURAL at 14:48

## 2019-01-04 RX ORDER — AZITHROMYCIN 250 MG/1
TABLET, FILM COATED ORAL
Qty: 6 TABLET | Refills: 0 | Status: SHIPPED | OUTPATIENT
Start: 2019-01-04 | End: 2019-01-14

## 2019-01-18 RX ORDER — AMOXICILLIN 500 MG/1
1000 CAPSULE ORAL 2 TIMES DAILY
Qty: 40 CAPSULE | Refills: 0 | Status: SHIPPED | OUTPATIENT
Start: 2019-01-18 | End: 2019-01-28

## 2019-02-04 ENCOUNTER — OFFICE VISIT (OUTPATIENT)
Dept: FAMILY MEDICINE CLINIC | Age: 49
End: 2019-02-04
Payer: COMMERCIAL

## 2019-02-04 VITALS
TEMPERATURE: 97.8 F | BODY MASS INDEX: 31.35 KG/M2 | DIASTOLIC BLOOD PRESSURE: 82 MMHG | HEART RATE: 80 BPM | SYSTOLIC BLOOD PRESSURE: 139 MMHG | WEIGHT: 177 LBS | OXYGEN SATURATION: 96 %

## 2019-02-04 DIAGNOSIS — Z79.4 TYPE 2 DIABETES MELLITUS WITHOUT COMPLICATION, WITH LONG-TERM CURRENT USE OF INSULIN (HCC): ICD-10-CM

## 2019-02-04 DIAGNOSIS — N92.1 MENORRHAGIA WITH IRREGULAR CYCLE: ICD-10-CM

## 2019-02-04 DIAGNOSIS — E11.9 TYPE 2 DIABETES MELLITUS WITHOUT COMPLICATION, WITH LONG-TERM CURRENT USE OF INSULIN (HCC): ICD-10-CM

## 2019-02-04 DIAGNOSIS — S43.421D SPRAIN OF RIGHT ROTATOR CUFF CAPSULE, SUBSEQUENT ENCOUNTER: Primary | ICD-10-CM

## 2019-02-04 DIAGNOSIS — R53.82 CHRONIC FATIGUE: ICD-10-CM

## 2019-02-04 LAB — HBA1C MFR BLD: 9.3 %

## 2019-02-04 PROCEDURE — 83036 HEMOGLOBIN GLYCOSYLATED A1C: CPT | Performed by: FAMILY MEDICINE

## 2019-02-04 PROCEDURE — 99214 OFFICE O/P EST MOD 30 MIN: CPT | Performed by: FAMILY MEDICINE

## 2019-02-05 ENCOUNTER — HOSPITAL ENCOUNTER (OUTPATIENT)
Age: 49
Setting detail: SPECIMEN
Discharge: HOME | End: 2019-02-05
Payer: COMMERCIAL

## 2019-02-05 ASSESSMENT — PATIENT HEALTH QUESTIONNAIRE - PHQ9
SUM OF ALL RESPONSES TO PHQ QUESTIONS 1-9: 1
SUM OF ALL RESPONSES TO PHQ QUESTIONS 1-9: 1
SUM OF ALL RESPONSES TO PHQ9 QUESTIONS 1 & 2: 1
1. LITTLE INTEREST OR PLEASURE IN DOING THINGS: 0
2. FEELING DOWN, DEPRESSED OR HOPELESS: 1

## 2019-02-08 ENCOUNTER — OFFICE VISIT (OUTPATIENT)
Dept: ORTHOPEDIC SURGERY | Age: 49
End: 2019-02-08
Payer: COMMERCIAL

## 2019-02-08 VITALS — HEIGHT: 63 IN | WEIGHT: 174 LBS | BODY MASS INDEX: 30.83 KG/M2

## 2019-02-08 DIAGNOSIS — G89.29 CHRONIC RIGHT SHOULDER PAIN: ICD-10-CM

## 2019-02-08 DIAGNOSIS — M25.511 CHRONIC RIGHT SHOULDER PAIN: ICD-10-CM

## 2019-02-08 PROCEDURE — 99213 OFFICE O/P EST LOW 20 MIN: CPT | Performed by: ORTHOPAEDIC SURGERY

## 2019-02-08 PROCEDURE — 20610 DRAIN/INJ JOINT/BURSA W/O US: CPT | Performed by: ORTHOPAEDIC SURGERY

## 2019-02-08 RX ORDER — TRIAMCINOLONE ACETONIDE 40 MG/ML
40 INJECTION, SUSPENSION INTRA-ARTICULAR; INTRAMUSCULAR ONCE
Status: COMPLETED | OUTPATIENT
Start: 2019-02-08 | End: 2019-02-08

## 2019-02-08 RX ORDER — LIDOCAINE HYDROCHLORIDE 10 MG/ML
5 INJECTION, SOLUTION INFILTRATION; PERINEURAL ONCE
Status: COMPLETED | OUTPATIENT
Start: 2019-02-08 | End: 2019-02-08

## 2019-02-08 RX ORDER — DICLOFENAC SODIUM 75 MG/1
75 TABLET, DELAYED RELEASE ORAL 2 TIMES DAILY WITH MEALS
Qty: 28 TABLET | Refills: 0 | Status: SHIPPED | OUTPATIENT
Start: 2019-02-08 | End: 2019-05-22 | Stop reason: ALTCHOICE

## 2019-02-08 RX ADMIN — TRIAMCINOLONE ACETONIDE 40 MG: 40 INJECTION, SUSPENSION INTRA-ARTICULAR; INTRAMUSCULAR at 13:16

## 2019-02-08 RX ADMIN — LIDOCAINE HYDROCHLORIDE 5 ML: 10 INJECTION, SOLUTION INFILTRATION; PERINEURAL at 13:16

## 2019-02-09 LAB — CORTISOL SALIVARY: 0.08 UG/DL

## 2019-02-14 ENCOUNTER — OFFICE VISIT (OUTPATIENT)
Dept: FAMILY MEDICINE CLINIC | Age: 49
End: 2019-02-14
Payer: COMMERCIAL

## 2019-02-14 VITALS
HEART RATE: 70 BPM | OXYGEN SATURATION: 96 % | WEIGHT: 174 LBS | DIASTOLIC BLOOD PRESSURE: 76 MMHG | BODY MASS INDEX: 30.82 KG/M2 | SYSTOLIC BLOOD PRESSURE: 129 MMHG

## 2019-02-14 DIAGNOSIS — E11.9 TYPE 2 DIABETES MELLITUS WITHOUT COMPLICATION, WITH LONG-TERM CURRENT USE OF INSULIN (HCC): Primary | ICD-10-CM

## 2019-02-14 DIAGNOSIS — Z79.4 TYPE 2 DIABETES MELLITUS WITHOUT COMPLICATION, WITH LONG-TERM CURRENT USE OF INSULIN (HCC): Primary | ICD-10-CM

## 2019-02-14 PROCEDURE — 99213 OFFICE O/P EST LOW 20 MIN: CPT | Performed by: FAMILY MEDICINE

## 2019-02-14 RX ORDER — AMOXICILLIN 500 MG/1
500 CAPSULE ORAL 3 TIMES DAILY
Qty: 30 CAPSULE | Refills: 0 | Status: SHIPPED | OUTPATIENT
Start: 2019-02-14 | End: 2019-02-24

## 2019-04-14 ENCOUNTER — HOSPITAL ENCOUNTER (EMERGENCY)
Facility: CLINIC | Age: 49
Discharge: HOME | End: 2019-04-14
Attending: EMERGENCY MEDICINE
Payer: COMMERCIAL

## 2019-04-14 ENCOUNTER — APPOINTMENT (OUTPATIENT)
Dept: CT IMAGING | Facility: CLINIC | Age: 49
End: 2019-04-14
Payer: COMMERCIAL

## 2019-04-14 VITALS
HEIGHT: 66 IN | WEIGHT: 173.94 LBS | HEART RATE: 91 BPM | BODY MASS INDEX: 27.95 KG/M2 | TEMPERATURE: 99.2 F | SYSTOLIC BLOOD PRESSURE: 138 MMHG | DIASTOLIC BLOOD PRESSURE: 68 MMHG | RESPIRATION RATE: 20 BRPM | OXYGEN SATURATION: 92 %

## 2019-04-14 DIAGNOSIS — J40 BRONCHITIS: Primary | ICD-10-CM

## 2019-04-14 LAB
ABSOLUTE EOS #: 0.1 K/UL (ref 0–0.4)
ABSOLUTE IMMATURE GRANULOCYTE: ABNORMAL K/UL (ref 0–0.3)
ABSOLUTE LYMPH #: 0.9 K/UL (ref 1–4.8)
ABSOLUTE MONO #: 0.5 K/UL (ref 0.1–1.2)
ANION GAP SERPL CALCULATED.3IONS-SCNC: 13 MMOL/L (ref 9–17)
BASOPHILS # BLD: 1 % (ref 0–2)
BASOPHILS ABSOLUTE: 0 K/UL (ref 0–0.2)
BUN BLDV-MCNC: 8 MG/DL (ref 6–20)
BUN/CREAT BLD: ABNORMAL (ref 9–20)
CALCIUM SERPL-MCNC: 9.4 MG/DL (ref 8.6–10.4)
CHLORIDE BLD-SCNC: 100 MMOL/L (ref 98–107)
CO2: 24 MMOL/L (ref 20–31)
CREAT SERPL-MCNC: 0.9 MG/DL (ref 0.5–0.9)
DIFFERENTIAL TYPE: ABNORMAL
EOSINOPHILS RELATIVE PERCENT: 1 % (ref 1–4)
GFR AFRICAN AMERICAN: >60 ML/MIN
GFR NON-AFRICAN AMERICAN: >60 ML/MIN
GFR SERPL CREATININE-BSD FRML MDRD: ABNORMAL ML/MIN/{1.73_M2}
GFR SERPL CREATININE-BSD FRML MDRD: ABNORMAL ML/MIN/{1.73_M2}
GLUCOSE BLD-MCNC: 157 MG/DL (ref 70–99)
HCT VFR BLD CALC: 39.3 % (ref 36–46)
HEMOGLOBIN: 13.2 G/DL (ref 12–16)
IMMATURE GRANULOCYTES: ABNORMAL %
LYMPHOCYTES # BLD: 15 % (ref 24–44)
MCH RBC QN AUTO: 30 PG (ref 26–34)
MCHC RBC AUTO-ENTMCNC: 33.6 G/DL (ref 31–37)
MCV RBC AUTO: 89 FL (ref 80–100)
MONOCYTES # BLD: 8 % (ref 2–11)
NRBC AUTOMATED: ABNORMAL PER 100 WBC
PDW BLD-RTO: 14.4 % (ref 12.5–15.4)
PLATELET # BLD: 120 K/UL (ref 140–450)
PLATELET ESTIMATE: ABNORMAL
PMV BLD AUTO: 9.1 FL (ref 6–12)
POTASSIUM SERPL-SCNC: 3.9 MMOL/L (ref 3.7–5.3)
RBC # BLD: 4.41 M/UL (ref 4–5.2)
RBC # BLD: ABNORMAL 10*6/UL
SEG NEUTROPHILS: 75 % (ref 36–66)
SEGMENTED NEUTROPHILS ABSOLUTE COUNT: 4.8 K/UL (ref 1.8–7.7)
SODIUM BLD-SCNC: 137 MMOL/L (ref 135–144)
TROPONIN INTERP: NORMAL
TROPONIN T: NORMAL NG/ML
TROPONIN, HIGH SENSITIVITY: <6 NG/L (ref 0–14)
WBC # BLD: 6.3 K/UL (ref 3.5–11)
WBC # BLD: ABNORMAL 10*3/UL

## 2019-04-14 PROCEDURE — 84484 ASSAY OF TROPONIN QUANT: CPT

## 2019-04-14 PROCEDURE — 6360000004 HC RX CONTRAST MEDICATION: Performed by: EMERGENCY MEDICINE

## 2019-04-14 PROCEDURE — 36415 COLL VENOUS BLD VENIPUNCTURE: CPT

## 2019-04-14 PROCEDURE — 80048 BASIC METABOLIC PNL TOTAL CA: CPT

## 2019-04-14 PROCEDURE — 71260 CT THORAX DX C+: CPT

## 2019-04-14 PROCEDURE — 2580000003 HC RX 258: Performed by: EMERGENCY MEDICINE

## 2019-04-14 PROCEDURE — 93005 ELECTROCARDIOGRAM TRACING: CPT

## 2019-04-14 PROCEDURE — 85025 COMPLETE CBC W/AUTO DIFF WBC: CPT

## 2019-04-14 PROCEDURE — 99285 EMERGENCY DEPT VISIT HI MDM: CPT

## 2019-04-14 RX ORDER — AMOXICILLIN 500 MG/1
500 CAPSULE ORAL 3 TIMES DAILY
Qty: 30 CAPSULE | Refills: 0 | Status: SHIPPED | OUTPATIENT
Start: 2019-04-14 | End: 2019-10-25 | Stop reason: SDUPTHER

## 2019-04-14 RX ORDER — PREDNISONE 10 MG/1
TABLET ORAL
Qty: 20 TABLET | Refills: 0 | Status: SHIPPED | OUTPATIENT
Start: 2019-04-14 | End: 2019-04-24

## 2019-04-14 RX ORDER — ALBUTEROL SULFATE 90 UG/1
2 AEROSOL, METERED RESPIRATORY (INHALATION) EVERY 6 HOURS PRN
Qty: 1 INHALER | Refills: 3 | Status: SHIPPED | OUTPATIENT
Start: 2019-04-14 | End: 2022-01-11 | Stop reason: SDUPTHER

## 2019-04-14 RX ORDER — 0.9 % SODIUM CHLORIDE 0.9 %
70 INTRAVENOUS SOLUTION INTRAVENOUS ONCE
Status: COMPLETED | OUTPATIENT
Start: 2019-04-14 | End: 2019-04-14

## 2019-04-14 RX ORDER — SODIUM CHLORIDE 0.9 % (FLUSH) 0.9 %
10 SYRINGE (ML) INJECTION PRN
Status: DISCONTINUED | OUTPATIENT
Start: 2019-04-14 | End: 2019-04-14 | Stop reason: HOSPADM

## 2019-04-14 RX ORDER — BENZONATATE 100 MG/1
100 CAPSULE ORAL 3 TIMES DAILY PRN
Qty: 30 CAPSULE | Refills: 0 | Status: SHIPPED | OUTPATIENT
Start: 2019-04-14 | End: 2019-04-21

## 2019-04-14 RX ADMIN — Medication 10 ML: at 13:10

## 2019-04-14 RX ADMIN — IOPAMIDOL 75 ML: 755 INJECTION, SOLUTION INTRAVENOUS at 13:15

## 2019-04-14 RX ADMIN — SODIUM CHLORIDE 70 ML: 9 INJECTION, SOLUTION INTRAVENOUS at 13:15

## 2019-04-14 ASSESSMENT — PAIN DESCRIPTION - LOCATION: LOCATION: NECK;CHEST

## 2019-04-14 ASSESSMENT — PAIN SCALES - GENERAL: PAINLEVEL_OUTOF10: 4

## 2019-04-14 ASSESSMENT — ENCOUNTER SYMPTOMS: COUGH: 1

## 2019-04-14 ASSESSMENT — PAIN DESCRIPTION - PAIN TYPE: TYPE: ACUTE PAIN

## 2019-04-14 ASSESSMENT — PAIN DESCRIPTION - FREQUENCY: FREQUENCY: CONTINUOUS

## 2019-04-14 ASSESSMENT — PAIN DESCRIPTION - DESCRIPTORS: DESCRIPTORS: ACHING

## 2019-04-14 NOTE — ED PROVIDER NOTES
[facility]  [address] [address]  Phone: [phone]        Pt Name: Vivian Vaughn  MRN: [id number]  [place] 1970  Date of evaluation: 19      CHIEF COMPLAINT     Chief Complaint   Patient presents with    Cough    Chest Pain    Neck Pain         HISTORY OF PRESENT ILLNESS  (Location/Symptom, Timing/Onset, Context/Setting, Quality, Duration, Modifying Factors, Severity.)    Vivian Vaughn is a 53 y.o. female who presents with a cough chest pain. The patient states that she just recently returned from [place] and yesterday morning developed a cough chest pain. She has some slight congestion. The patient states coughing makes her chest pain worse. No swelling of the arms or legs, no vomiting no diarrhea no fever. The cough is productive of a yellow-green sputum       REVIEW OF SYSTEMS    (2-9 systems for level 4, 10 or more for level 5)     Review of Systems   HENT: Positive for congestion. Respiratory: Positive for cough. Cardiovascular: Positive for chest pain. All other systems reviewed and are negative. PAST MEDICAL HISTORY    has a past medical history of Anomalous coronary artery origin, Anxiety, Asthma, Back pain, Diabetes mellitus ([facility] [address] Mitral prolapse, and Thoracic outlet syndrome. SURGICAL HISTORY      has a past surgical history that includes back surgery; hernia repair; Abdomen surgery; Tubal ligation; and  section. CURRENTMEDICATIONS       Previous Medications    ALBUTEROL (PROVENTIL HFA;VENTOLIN HFA) 108 (90 BASE) MCG/ACT INHALER    Inhale 2 puffs into the lungs every 6 hours as needed.     ALBUTEROL (PROVENTIL) (2.5 MG/3ML) 0.083% NEBULIZER SOLUTION    Take 3 mLs by nebulization every 6 hours as needed for Wheezing    CLONAZEPAM (KLONOPIN) 0.5 MG TABLET    Take 1 tablet by mouth 2 times daily as needed for Anxiety    DICLOFENAC (VOLTAREN) 75 MG EC TABLET    Take 1 tablet by mouth 2 times daily (with meals) for 14 days DICLOFENAC SODIUM 1 % GEL    Apply 4 g topically 4 times daily    ERGOCALCIFEROL (VITAMIN D2 PO)        ESOMEPRAZOLE (NEXIUM) 40 MG DELAYED RELEASE CAPSULE    Take 1 capsule by mouth daily    FERROUS SULFATE (FE TABS) 325 (65 FE) MG EC TABLET    Take 1 tablet by mouth 2 times daily    FLUTICASONE-SALMETEROL (ADVAIR DISKUS) 500-50 MCG/DOSE DISKUS INHALER    Inhale 1 puff into the lungs every 12 hours    HYDROCORTISONE 2.5 % CREAM    Apply qpm to corners of mouth and to base of fingernail    IBUPROFEN (ADVIL;MOTRIN) 800 MG TABLET    Take 1 tablet by mouth every 8 hours as needed for Pain    INSULIN GLARGINE (TOUJEO SOLOSTAR) 300 UNIT/ML INJECTION PEN    Inject 23 Units into the skin daily     KETOCONAZOLE (NIZORAL) 2 % CREAM    Apply qam to corners of mouth and base of fingernail    LEVOTHYROXINE (SYNTHROID) 25 MCG TABLET        MAGIC MOUTHWASH (MIRACLE MOUTHWASH)    Take 15 mLs by mouth    MEDROXYPROGESTERONE (PROVERA) 10 MG TABLET    Take 10 mg by mouth    METOPROLOL SUCCINATE (TOPROL XL) 25 MG EXTENDED RELEASE TABLET    Take 2 tablets by mouth daily    NITROGLYCERIN (NITROSTAT) 0.4 MG SL TABLET    Place 0.4 mg under the tongue every 5 minutes as needed for Chest pain Dissolve 1 tab under tongue at first sign of chest pain. May repeat every 5 minutes until relief is obtained. If pain persists after taking 3 tabs in a 15-minute period, or the pain is different than is typically experienced, call 9-1-1 immediately.     NYSTATIN (MYCOSTATIN) 061795 UNIT/ML SUSPENSION    Take 5 mLs by mouth 4 times daily Swish and spit    PANTOPRAZOLE SODIUM (PROTONIX) 40 MG PACK PACKET    Take 40 mg by mouth daily    RANOLAZINE (RANEXA) 1000 MG EXTENDED RELEASE TABLET    Take 1,000 mg by mouth    SITAGLIPTIN (JANUVIA) 25 MG TABLET    Take 25 mg by mouth daily    TRIAMCINOLONE (KENALOG) 0.1 % OINTMENT    Apply to rash on hands twice daily    TRUEPLUS LANCETS 33G MISC        TRUETEST TEST STRIP           ALLERGIES     is allergic to noted.   Psychiatric: She has a normal mood and affect. Nursing note and vitals reviewed. DIFFERENTIAL DIAGNOSIS/ MDM:     Given the recent travel. I will get an EKG, labs and a CT scan of the chest    DIAGNOSTIC RESULTS     EKG: All EKG's are interpreted by the Emergency Department Physician who either signs or Co-signs this chart in the [address] a cardiologist.      Interpreted by Stefany Guerra MD     Rhythm: normal sinus   Rate: 94  Axis: -1  Ectopy: none  Conduction: normal  ST Segments: no acute change  T Waves: no acute change  Q Waves: none    Clinical Impression: normal sinus rhythm with no acute changes/normal EKG. No acute infarction/ischemia noted. RADIOLOGY:  Non-plain film images such as CT, Ultrasound and MRI are read by the radiologist. Savage Garciakins radiographic images are visualized and the radiologist interpretations are reviewed as follows:         Interpretation per the Radiologist below, if available at the time of this note:    CT Chest Pulmonary Embolism W Contrast (Final result)   Result time 04/14/19 13:59:54   Final result by Alden Huff MD (04/14/19 13:59:54)                Impression:    No evidence of pulmonary embolism or acute pulmonary abnormality. Narrative:    EXAMINATION:  CTA OF THE CHEST 4/14/2019 12:47 pm    TECHNIQUE:  CTA of the chest was performed after the administration of intravenous  contrast.  Multiplanar reformatted images are provided for review.  MIP  images are provided for review. Dose modulation, iterative reconstruction,  and/or weight based adjustment of the mA/kV was utilized to reduce the  radiation dose to as low as reasonably achievable.     COMPARISON:  09/05/2016    HISTORY:  ORDERING SYSTEM PROVIDED HISTORY: chest pain, cough, recent travel  TECHNOLOGIST PROVIDED HISTORY:  Ordering Physician Provided Reason for Exam: mid sternal chest pain  Acuity: Acute  Type of Exam: Initial  Additional signs and symptoms: generalized neck pain, coughing, vomiting,  sore throat, body aches, chills, and fever  Relevant Medical/Surgical History: current everyday smoker, mitral prolapse,  asthma, and diabetes    FINDINGS:  Pulmonary Arteries: Pulmonary arteries are adequately opacified for  evaluation.  No evidence of intraluminal filling defect to suggest pulmonary  embolism.  Main pulmonary artery is normal in caliber. Mediastinum: No evidence of mediastinal lymphadenopathy.  Normal heart size. Normal thoracic aorta. Lungs/pleura: Biapical/upper lobe paraseptal emphysematous change.  No focal  consolidation or pulmonary edema.  No evidence of pleural effusion or  pneumothorax. Upper Abdomen: Limited images of the upper abdomen are unremarkable.     Soft Tissues/Bones: No acute bone or soft tissue abnormality.                      LABS:  Results for orders placed or performed during the hospital encounter of 04/14/19   CBC Auto Differential   Result Value Ref Range    WBC 6.3 3.5 - 11.0 k/uL    RBC 4.41 4.0 - 5.2 m/uL    Hemoglobin 13.2 12.0 - 16.0 g/dL    Hematocrit 39.3 36 - 46 %    MCV 89.0 80 - 100 fL    MCH 30.0 26 - 34 pg    MCHC 33.6 31 - 37 g/dL    RDW 14.4 12.5 - 15.4 %    Platelets 067 (L) 925 - 450 k/uL    MPV 9.1 6.0 - 12.0 fL    NRBC Automated NOT REPORTED per 100 WBC    Differential Type NOT REPORTED     Seg Neutrophils 75 (H) 36 - 66 %    Lymphocytes 15 (L) 24 - 44 %    Monocytes 8 2 - 11 %    Eosinophils % 1 1 - 4 %    Basophils 1 0 - 2 %    Immature Granulocytes NOT REPORTED 0 %    Segs Absolute 4.80 1.8 - 7.7 k/uL    Absolute Lymph # 0.90 (L) 1.0 - 4.8 k/uL    Absolute Mono # 0.50 0.1 - 1.2 k/uL    Absolute Eos # 0.10 0.0 - 0.4 k/uL    Basophils # 0.00 0.0 - 0.2 k/uL    Absolute Immature Granulocyte NOT REPORTED 0.00 - 0.30 k/uL    WBC Morphology NOT REPORTED     RBC Morphology NOT REPORTED     Platelet Estimate NOT REPORTED    Basic Metabolic Panel   Result Value Ref Range    Glucose 157 (H) 70 - 99 mg/dL    BUN 8 6 - 20 mg/dL CREATININE 0.90 0.50 - 0.90 mg/dL    Bun/Cre Ratio NOT REPORTED 9 - 20    Calcium 9.4 8.6 - 10.4 mg/dL    Sodium 137 135 - 144 mmol/L    Potassium 3.9 3.7 - 5.3 mmol/L    Chloride 100 98 - 107 mmol/L    CO2 24 20 - 31 mmol/L    Anion Gap 13 9 - 17 mmol/L    GFR Non-African American >60 >60 mL/min    GFR African American >60 >60 mL/min    GFR Comment          GFR Staging NOT REPORTED    Troponin   Result Value Ref Range    Troponin, High Sensitivity <6 0 - 14 ng/L    Troponin T NOT REPORTED <0.03 ng/mL    Troponin Interp NOT REPORTED            EMERGENCY DEPARTMENT COURSE:   Vitals:    Vitals:    04/14/19 1208   BP: (!) 150/88   Pulse: 97   Resp: 18   Temp: 99.2 °F (37.3 °C)   TempSrc: Oral   SpO2: 97%   Weight: 78.9 kg (173 lb 15.1 oz)   Height: 5' 6\" (1.676 m)     -------------------------  BP: (!) 150/88, Temp: 99.2 °F (37.3 °C), Pulse: 97, Resp: 18      RE-EVALUATION:  Workup is unremarkable. The patient is not tachycardic she is not hypoxic. Given this, I do feel she can follow this up as an outpatient. The patient states she has previously been on amoxicillin. So I will write prescriptions for amoxicillin, prednisone, Ventolin and prednisone. I'm recommending that she return to the ER for increasing pain shortness of breath fever greater than 101, vomiting or other concerns otherwise to follow-up with her family doctor within the next few days  At this time the patient is without objective evidence of an acute process requiring hospitalization or inpatient management. They have remained hemodynamically stable throughout their entire ED visit and are stable for discharge with outpatient follow-up. The plan has been discussed in detail and they are aware of the specific conditions for emergent return, as well as the importance of follow-up    I have reviewed the disposition diagnosis with the patient and or their family/guardian. I have answered their questions and given discharge instructions.   They voiced understanding of these instructions and did not have any further questions or complaints. PROCEDURES:  None    FINAL IMPRESSION      1. Bronchitis          DISPOSITION/PLAN   DISPOSITION        CONDITION ON DISPOSITION:   Stable    PATIENT REFERRED TO:  Samantha Rutherford MD  [address] [place] [address],  [address] [place] [address]  [phone]    Call in 2 days        DISCHARGE MEDICATIONS:  New Prescriptions    ALBUTEROL SULFATE HFA (VENTOLIN HFA) 108 (90 BASE) MCG/ACT INHALER    Inhale 2 puffs into the lungs every 6 hours as needed for Wheezing    AMOXICILLIN (AMOXIL) 500 MG CAPSULE    Take 1 capsule by mouth 3 times daily for 10 days    BENZONATATE (TESSALON PERLES) 100 MG CAPSULE    Take 1 capsule by mouth 3 times daily as needed for Cough    PREDNISONE (DELTASONE) 10 MG TABLET    Take 4 tablets by mouth once daily for 5 days       (Please note that portions of this note were completed with a voicerecognition program.  Efforts were made to edit the dictations but occasionally words are mis-transcribed.)    Reich MD, F.A.C.E.P.   Attending Emergency Medicine Physician       Ariane Silva MD  04/14/19 0258

## 2019-04-15 LAB
EKG ATRIAL RATE: 94 BPM
EKG P AXIS: 37 DEGREES
EKG P-R INTERVAL: 168 MS
EKG Q-T INTERVAL: 352 MS
EKG QRS DURATION: 82 MS
EKG QTC CALCULATION (BAZETT): 440 MS
EKG T AXIS: 53 DEGREES
EKG VENTRICULAR RATE: 94 BPM

## 2019-05-22 ENCOUNTER — OFFICE VISIT (OUTPATIENT)
Dept: DERMATOLOGY | Age: 49
End: 2019-05-22
Payer: COMMERCIAL

## 2019-05-22 VITALS
HEIGHT: 63 IN | BODY MASS INDEX: 31.79 KG/M2 | HEART RATE: 73 BPM | WEIGHT: 179.4 LBS | OXYGEN SATURATION: 95 % | SYSTOLIC BLOOD PRESSURE: 152 MMHG | DIASTOLIC BLOOD PRESSURE: 90 MMHG

## 2019-05-22 DIAGNOSIS — L85.1 STUCCO KERATOSES: ICD-10-CM

## 2019-05-22 DIAGNOSIS — L28.2 PRURIGO: Primary | ICD-10-CM

## 2019-05-22 DIAGNOSIS — L60.8 LEUKONYCHIA: ICD-10-CM

## 2019-05-22 DIAGNOSIS — K13.0 PERLECHE: ICD-10-CM

## 2019-05-22 PROCEDURE — 11900 INJECT SKIN LESIONS </W 7: CPT | Performed by: DERMATOLOGY

## 2019-05-22 RX ORDER — KETOCONAZOLE 20 MG/G
CREAM TOPICAL
Qty: 30 G | Refills: 2 | Status: SHIPPED | OUTPATIENT
Start: 2019-05-22

## 2019-05-22 NOTE — PATIENT INSTRUCTIONS
Seborrheic Keratosis  Seborrheic keratoses are common benign growths of unknown cause seen in adults due to a thickening of an area of the top skin layer. Who's At Risk  Although they can occur anytime after puberty, almost everyone over 48 has one or more of these and they increase in number with age. Some families have an inherited tendency to grow multiple lesions. Men and women are equally as likely to develop seborrheic keratoses. Dark-skinned people are less affected than those with light skin; a variant seen in blacks is called dermatosis papulosa nigra. Signs & Symptoms  One or more spots can occur anywhere on the body, except for palms, soles, and mucous membranes (eg, in the mouth or rectum). They do not go away. They do not turn into cancers, but some cancers resemble seborrheic keratosis. They start as light brown to skin-colored, flat areas, which are round to oval and of varying size (usually less than a half inch, but sometimes much larger). As they grow thicker and rise above the skin surface, seborrheic keratoses may become dark brown to almost black with a \"stuck on\" appearance. The surface may feel smooth or rough. Self-Care Guidelines  No treatment is needed unless there is irritation from clothing with itching or bleeding. There is no way to prevent new spots from forming. Some lotions with alpha hydroxyl acids may make the areas feel smoother with regular use but will not eliminate them. OTC freezing techniques are available but usually not effective. When to [facility]  If a spot on the skin is growing, bleeding, painful, or itchy, or any other concerning changes, then see your doctor. Prurigo nodularis (PN) is a skin disease that causes hard, itchy lumps (nodules) to form on the skin.  The itching (pruritus) can be intense, causing people to scratch themselves to the point of bleeding or pain    Referral for cosmetics:  [facility]   [phone]

## 2019-05-22 NOTE — PROGRESS NOTES
sodium (PROTONIX) 40 MG PACK packet Take 40 mg by mouth daily      metoprolol succinate (TOPROL XL) 25 MG extended release tablet Take 2 tablets by mouth daily 60 tablet 3    ranolazine (RANEXA) 1000 MG extended release tablet Take 1,000 mg by mouth      insulin glargine (TOUJEO SOLOSTAR) 300 UNIT/ML injection pen Inject 23 Units into the skin daily       nitroGLYCERIN (NITROSTAT) 0.4 MG SL tablet Place 0.4 mg under the tongue every 5 minutes as needed for Chest pain Dissolve 1 tab under tongue at first sign of chest pain. May repeat every 5 minutes until relief is obtained. If pain persists after taking 3 tabs in a 15-minute period, or the pain is different than is typically experienced, call 9-1-1 immediately.  TRUETEST TEST strip   0    TRUEPLUS LANCETS 33G MISC   0    albuterol (PROVENTIL) (2.5 MG/3ML) 0.083% nebulizer solution Take 3 mLs by nebulization every 6 hours as needed for Wheezing 120 each 3    albuterol (PROVENTIL HFA;VENTOLIN HFA) 108 (90 BASE) MCG/ACT inhaler Inhale 2 puffs into the lungs every 6 hours as needed.  1 Inhaler 3    clonazePAM (KLONOPIN) 0.5 MG tablet Take 1 tablet by mouth 2 times daily as needed for Anxiety 20 tablet 0     Current Facility-Administered Medications   Medication Dose Route Frequency Provider Last Rate Last Dose    triamcinolone acetonide (KENALOG) injection 3 mg  3 mg Intra-Lesional Once Mary Rosas MD         Facility-Administered Medications Ordered in Other Visits   Medication Dose Route Frequency Provider Last Rate Last Dose    nitroGLYCERIN (NITROSTAT) SL tablet 0.4 mg  0.4 mg Sublingual Q5 Min PRN Dung Albright MD   0.4 mg at 09/16/16 0857    0.9 % sodium chloride infusion   Intravenous Continuous Dung Albright MD 20 mL/hr at 09/16/16 0857         ALLERGIES:   Allergies   Allergen Reactions    Bactrim [Sulfamethoxazole-Trimethoprim]     Biaxin [Clarithromycin]     Ceclor [Cefaclor]        SOCIAL HISTORY:  Social History     Tobacco Use  Smoking status: Current Every Day Smoker     Packs/day: 0.50     Years: 20.00     Pack years: 10.00     Types: Cigarettes    Smokeless tobacco: Never Used   Substance Use Topics    Alcohol use: No     Alcohol/week: 0.0 oz       REVIEW OF SYSTEMS:  Review of Systems  Skin: Denies any new changing, growing orbleeding lesions or rashes except as described in the HPI   Constitutional: Denies fevers, chills, and malaise. PHYSICAL EXAM:   BP (!) 152/90 (Site: Left Upper Arm, Position: Sitting, Cuff Size: Large Adult)   Pulse 73   Ht 5' 3\" (1.6 m)   Wt 179 lb 6.4 oz (81.4 kg)   LMP 03/22/2019   SpO2 95%   BMI 31.78 kg/m²     General Exam:  General Appearance: No acute distress, Well nourished     Neuro: Alert and oriented to person, place and time  Psych: Normal affect   Lymph Node: Not performed    Cutaneous Exam: Performed as documented in clinic note below. Head/face,neck, both arms, digits and/or nails, and limited lower extremities (that which is visible with pants/shorts and shoes/socks on) was examined. Pertinent Physical Exam Findings:  Physical Exam  Oral commissures with erythematous thin plaques  Ankles and dorsal feet with white stuck-on papule  Occipital scalp with excoriated papules  Leukonychia of thumb and index finger bilaterally    Photo surveillance performed: No    Medical Necessity of Exam Performed:   Distribution of patient concerns    Additional Diagnostic Testing performed during exam: Not performed ,  Not performed    ASSESSMENT:   Diagnosis Orders   1. Prurigo  triamcinolone acetonide (KENALOG) injection 3 mg    INJECTION INTO SKIN LESIONS, UP TO 7   2. Perleche  hydrocortisone 2.5 % cream    ketoconazole (NIZORAL) 2 % cream   3. Leukonychia     4. Stucco keratoses         Plan of Action is as Follows:  Assessment   1. Prurigo of scalp  - these lesions are potentially starting as SK (suggestive in the new lesion she points out) then become prurigo nodules with excoriation. Counseled again itch-scratch cycle. Will need to stop picking to resolve them. Cryotherapy was ineffective at   Intralesional Injection: After discussion of risks including atrophy and dyspigmentation, patient gives verbal consent to proceed. After cleansing with alcohol the two prurigo nodules on the occipital scalp were injected with 0.2 ml of Kenalog 10  - triamcinolone acetonide (KENALOG) injection 3 mg  - INJECTION INTO SKIN LESIONS, UP TO 7    2. Perleche  - exacerbated by dental implants, prominent marionette line folds, and noncompliance with medication. Gave patient goodrx information. These creams should not be expensive  - hydrocortisone 2.5 % cream; Apply once daily to corners of mouth  Dispense: 60 g; Refill: 5  - ketoconazole (NIZORAL) 2 % cream; Apply once daily to corners of mouth  Dispense: 30 g; Refill: 2    3. Leukonychia  - CMP normal at , idiopathic, improved from   - reassureance    4. Stucco keratoses  - patient desires removal. Recommend discussion with cosmetic dermatologist, who may also be able to inject fillers for marionette lines and decrease incidence of perleche    RTC prn            Patient Instructions   Seborrheic Keratosis  Seborrheic keratoses are common benign growths of unknown cause seen in adults due to a thickening of an area of the top skin layer. Who's At Risk  Although they can occur anytime after puberty, almost everyone over 48 has one or more of these and they increase in number with age. Some families have an inherited tendency to grow multiple lesions. Men and women are equally as likely to develop seborrheic keratoses. Dark-skinned people are less affected than those with light skin; a variant seen in blacks is called dermatosis papulosa nigra. Signs & Symptoms  One or more spots can occur anywhere on the body, except for palms, soles, and mucous membranes (eg, in the mouth or rectum). They do not go away.  They do not turn into cancers, but some cancers resemble seborrheic keratosis. They start as light brown to skin-colored, flat areas, which are round to oval and of varying size (usually less than a half inch, but sometimes much larger). As they grow thicker and rise above the skin surface, seborrheic keratoses may become dark brown to almost black with a \"stuck on\" appearance. The surface may feel smooth or rough. Self-Care Guidelines  No treatment is needed unless there is irritation from clothing with itching or bleeding. There is no way to prevent new spots from forming. Some lotions with alpha hydroxyl acids may make the areas feel smoother with regular use but will not eliminate them. OTC freezing techniques are available but usually not effective. When to [facility]  If a spot on the skin is growing, bleeding, painful, or itchy, or any other concerning changes, then see your doctor. Prurigo nodularis (PN) is a skin disease that causes hard, itchy lumps (nodules) to form on the skin. The itching (pruritus) can be intense, causing people to scratch themselves to the point of bleeding or pain    Referral for cosmetics:  [facility]   [phone]        Follow-up: No follow-ups on file. This note was created with the assistance of a speech-recognition program.  Although the intention is to generate a document that actually reflects the content of the visit, no guarantees can be provided that every mistake has been identified and corrected byediting.     Electronically signed by Silas Hayes MD on 5/22/19 at 12:54 PM

## 2019-06-10 RX ORDER — AMOXICILLIN 250 MG
CAPSULE ORAL
Qty: 30 TABLET | Refills: 0 | Status: SHIPPED | OUTPATIENT
Start: 2019-06-10 | End: 2021-08-13

## 2019-06-17 ENCOUNTER — HOSPITAL ENCOUNTER (OUTPATIENT)
Facility: CLINIC | Age: 49
Discharge: HOME | End: 2019-06-17
Payer: COMMERCIAL

## 2019-06-17 LAB
ALBUMIN SERPL-MCNC: 4.1 G/DL (ref 3.5–5.2)
ALBUMIN/GLOBULIN RATIO: 1.9 (ref 1–2.5)
ALP BLD-CCNC: 44 U/L (ref 35–104)
ALT SERPL-CCNC: 24 U/L (ref 5–33)
ANION GAP SERPL CALCULATED.3IONS-SCNC: 13 MMOL/L (ref 9–17)
AST SERPL-CCNC: 19 U/L
BILIRUB SERPL-MCNC: 0.51 MG/DL (ref 0.3–1.2)
BUN BLDV-MCNC: 14 MG/DL (ref 6–20)
BUN/CREAT BLD: ABNORMAL (ref 9–20)
C-PEPTIDE: 3.2 NG/ML (ref 1.1–4.4)
CALCIUM SERPL-MCNC: 8.8 MG/DL (ref 8.6–10.4)
CHLORIDE BLD-SCNC: 103 MMOL/L (ref 98–107)
CHOLESTEROL, FASTING: 191 MG/DL
CHOLESTEROL/HDL RATIO: 2.4
CO2: 26 MMOL/L (ref 20–31)
CREAT SERPL-MCNC: 0.75 MG/DL (ref 0.5–0.9)
CREATININE URINE: 157.9 MG/DL (ref 28–217)
EOSINOPHILS ABSOLUTE: 78 /UL (ref 200–400)
EOSINOPHILS RELATIVE PERCENT: ABNORMAL %
GFR AFRICAN AMERICAN: >60 ML/MIN
GFR NON-AFRICAN AMERICAN: >60 ML/MIN
GFR SERPL CREATININE-BSD FRML MDRD: ABNORMAL ML/MIN/{1.73_M2}
GFR SERPL CREATININE-BSD FRML MDRD: ABNORMAL ML/MIN/{1.73_M2}
GLUCOSE FASTING: 93 MG/DL (ref 70–99)
HDLC SERPL-MCNC: 80 MG/DL
LDL CHOLESTEROL: 83 MG/DL (ref 0–130)
MICROALBUMIN/CREAT 24H UR: 16 MG/L
MICROALBUMIN/CREAT UR-RTO: 10 MCG/MG CREAT
POTASSIUM SERPL-SCNC: 4 MMOL/L (ref 3.7–5.3)
SODIUM BLD-SCNC: 142 MMOL/L (ref 135–144)
TOTAL PROTEIN: 6.3 G/DL (ref 6.4–8.3)
TRIGLYCERIDE, FASTING: 142 MG/DL
VITAMIN D 25-HYDROXY: 31.6 NG/ML (ref 30–100)
VLDLC SERPL CALC-MCNC: NORMAL MG/DL (ref 1–30)
WBC # BLD: ABNORMAL K/UL

## 2019-06-17 PROCEDURE — 86341 ISLET CELL ANTIBODY: CPT

## 2019-06-17 PROCEDURE — 84681 ASSAY OF C-PEPTIDE: CPT

## 2019-06-17 PROCEDURE — 82043 UR ALBUMIN QUANTITATIVE: CPT

## 2019-06-17 PROCEDURE — 82570 ASSAY OF URINE CREATININE: CPT

## 2019-06-17 PROCEDURE — 85048 AUTOMATED LEUKOCYTE COUNT: CPT

## 2019-06-17 PROCEDURE — 82306 VITAMIN D 25 HYDROXY: CPT

## 2019-06-17 PROCEDURE — 80061 LIPID PANEL: CPT

## 2019-06-17 PROCEDURE — 82787 IGG 1 2 3 OR 4 EACH: CPT

## 2019-06-17 PROCEDURE — 36415 COLL VENOUS BLD VENIPUNCTURE: CPT

## 2019-06-17 PROCEDURE — 80053 COMPREHEN METABOLIC PANEL: CPT

## 2019-06-19 LAB
IGG 1: 444 MG/DL (ref 240–1118)
IGG 2: 243 MG/DL (ref 124–549)
IGG 3: 36 MG/DL (ref 21–134)
IGG 4: 27 MG/DL (ref 1–123)

## 2019-06-20 LAB — ISLET CELL ANTIBODY: NORMAL

## 2019-07-03 ENCOUNTER — TELEPHONE (OUTPATIENT)
Dept: FAMILY MEDICINE CLINIC | Age: 49
End: 2019-07-03

## 2019-07-03 NOTE — TELEPHONE ENCOUNTER
Patient needs an MRI of her lower back in order to see the back doctor. Patient would also like a prescription for pain medication until she can get in to see the back doctor. Please contact patient.

## 2019-07-16 ENCOUNTER — TELEPHONE (OUTPATIENT)
Dept: PEDIATRICS CLINIC | Age: 49
End: 2019-07-16

## 2019-07-16 DIAGNOSIS — M54.50 LUMBAR PAIN WITH RADIATION DOWN RIGHT LEG: Primary | ICD-10-CM

## 2019-07-16 DIAGNOSIS — M51.36 BULGING OF LUMBAR INTERVERTEBRAL DISC: ICD-10-CM

## 2019-07-16 DIAGNOSIS — M79.604 LUMBAR PAIN WITH RADIATION DOWN RIGHT LEG: Primary | ICD-10-CM

## 2019-07-16 RX ORDER — CYCLOBENZAPRINE HCL 5 MG
5 TABLET ORAL 2 TIMES DAILY PRN
Qty: 30 TABLET | Refills: 0 | Status: SHIPPED | OUTPATIENT
Start: 2019-07-16 | End: 2022-02-28 | Stop reason: SDUPTHER

## 2019-07-24 DIAGNOSIS — M25.561 ACUTE PAIN OF RIGHT KNEE: Primary | ICD-10-CM

## 2019-07-25 ENCOUNTER — OFFICE VISIT (OUTPATIENT)
Dept: ORTHOPEDIC SURGERY | Age: 49
End: 2019-07-25
Payer: COMMERCIAL

## 2019-07-25 VITALS — HEIGHT: 64 IN | BODY MASS INDEX: 29.19 KG/M2 | WEIGHT: 171 LBS

## 2019-07-25 DIAGNOSIS — M25.561 RIGHT KNEE PAIN, UNSPECIFIED CHRONICITY: Primary | ICD-10-CM

## 2019-07-25 DIAGNOSIS — M65.311 TRIGGER FINGER OF RIGHT THUMB: ICD-10-CM

## 2019-07-25 PROCEDURE — 99213 OFFICE O/P EST LOW 20 MIN: CPT | Performed by: ORTHOPAEDIC SURGERY

## 2019-07-25 RX ORDER — DICLOFENAC SODIUM 75 MG/1
75 TABLET, DELAYED RELEASE ORAL 2 TIMES DAILY WITH MEALS
Qty: 28 TABLET | Refills: 0 | Status: SHIPPED | OUTPATIENT
Start: 2019-07-25 | End: 2019-10-02 | Stop reason: ALTCHOICE

## 2019-07-26 ENCOUNTER — HOSPITAL ENCOUNTER (OUTPATIENT)
Dept: ULTRASOUND IMAGING | Facility: CLINIC | Age: 49
Discharge: HOME | End: 2019-07-28
Payer: COMMERCIAL

## 2019-07-26 ENCOUNTER — HOSPITAL ENCOUNTER (OUTPATIENT)
Dept: CT IMAGING | Facility: CLINIC | Age: 49
Discharge: HOME | End: 2019-07-28
Payer: COMMERCIAL

## 2019-07-26 DIAGNOSIS — D44.10 NEOPLASM OF UNCERTAIN BEHAVIOR OF ADRENAL GLAND, UNSPECIFIED LATERALITY: ICD-10-CM

## 2019-07-26 DIAGNOSIS — E03.9 HYPOTHYROIDISM, UNSPECIFIED TYPE: ICD-10-CM

## 2019-07-26 DIAGNOSIS — D49.7 ADRENAL TUMOR: ICD-10-CM

## 2019-07-26 PROCEDURE — 74150 CT ABDOMEN W/O CONTRAST: CPT

## 2019-07-26 PROCEDURE — 76536 US EXAM OF HEAD AND NECK: CPT

## 2019-07-29 ENCOUNTER — OFFICE VISIT (OUTPATIENT)
Dept: FAMILY MEDICINE CLINIC | Age: 49
End: 2019-07-29
Payer: COMMERCIAL

## 2019-07-29 ENCOUNTER — HOSPITAL ENCOUNTER (OUTPATIENT)
Facility: CLINIC | Age: 49
Discharge: HOME | End: 2019-07-29
Payer: COMMERCIAL

## 2019-07-29 VITALS
BODY MASS INDEX: 31.22 KG/M2 | DIASTOLIC BLOOD PRESSURE: 91 MMHG | HEIGHT: 63 IN | WEIGHT: 176.2 LBS | SYSTOLIC BLOOD PRESSURE: 137 MMHG | HEART RATE: 80 BPM | OXYGEN SATURATION: 98 %

## 2019-07-29 DIAGNOSIS — R52 GENERALIZED BODY ACHES: ICD-10-CM

## 2019-07-29 DIAGNOSIS — R52 GENERALIZED BODY ACHES: Primary | ICD-10-CM

## 2019-07-29 LAB
ABSOLUTE EOS #: 0.19 K/UL (ref 0–0.44)
ABSOLUTE IMMATURE GRANULOCYTE: 0.05 K/UL (ref 0–0.3)
ABSOLUTE LYMPH #: 2.08 K/UL (ref 1.1–3.7)
ABSOLUTE MONO #: 0.41 K/UL (ref 0.1–1.2)
ABSOLUTE RETIC #: 0.13 M/UL (ref 0.03–0.08)
ALBUMIN SERPL-MCNC: 4.5 G/DL (ref 3.5–5.2)
ALBUMIN/GLOBULIN RATIO: 1.6 (ref 1–2.5)
ALP BLD-CCNC: 52 U/L (ref 35–104)
ALT SERPL-CCNC: 22 U/L (ref 5–33)
ANION GAP SERPL CALCULATED.3IONS-SCNC: 10 MMOL/L (ref 9–17)
AST SERPL-CCNC: 29 U/L
BASOPHILS # BLD: 1 % (ref 0–2)
BASOPHILS ABSOLUTE: 0.05 K/UL (ref 0–0.2)
BILIRUB SERPL-MCNC: 0.41 MG/DL (ref 0.3–1.2)
BUN BLDV-MCNC: 8 MG/DL (ref 6–20)
BUN/CREAT BLD: ABNORMAL (ref 9–20)
CALCIUM SERPL-MCNC: 9.2 MG/DL (ref 8.6–10.4)
CHLORIDE BLD-SCNC: 103 MMOL/L (ref 98–107)
CO2: 26 MMOL/L (ref 20–31)
CREAT SERPL-MCNC: 0.66 MG/DL (ref 0.5–0.9)
DIFFERENTIAL TYPE: ABNORMAL
EOSINOPHILS RELATIVE PERCENT: 3 % (ref 1–4)
FERRITIN: 59 UG/L (ref 13–150)
GFR AFRICAN AMERICAN: >60 ML/MIN
GFR NON-AFRICAN AMERICAN: >60 ML/MIN
GFR SERPL CREATININE-BSD FRML MDRD: ABNORMAL ML/MIN/{1.73_M2}
GFR SERPL CREATININE-BSD FRML MDRD: ABNORMAL ML/MIN/{1.73_M2}
GLUCOSE BLD-MCNC: 116 MG/DL (ref 70–99)
HCT VFR BLD CALC: 39.9 % (ref 36.3–47.1)
HEMOGLOBIN: 12.8 G/DL (ref 11.9–15.1)
IMMATURE GRANULOCYTES: 1 %
IMMATURE RETIC FRACT: 20.4 % (ref 2.7–18.3)
IRON SATURATION: 20 % (ref 20–55)
IRON: 68 UG/DL (ref 37–145)
LYMPHOCYTES # BLD: 28 % (ref 24–43)
MCH RBC QN AUTO: 29 PG (ref 25.2–33.5)
MCHC RBC AUTO-ENTMCNC: 32.1 G/DL (ref 28.4–34.8)
MCV RBC AUTO: 90.3 FL (ref 82.6–102.9)
MONOCYTES # BLD: 6 % (ref 3–12)
NRBC AUTOMATED: 0 PER 100 WBC
PDW BLD-RTO: 13.6 % (ref 11.8–14.4)
PLATELET # BLD: 131 K/UL (ref 138–453)
PLATELET ESTIMATE: ABNORMAL
PMV BLD AUTO: 12.1 FL (ref 8.1–13.5)
POTASSIUM SERPL-SCNC: 4 MMOL/L (ref 3.7–5.3)
RBC # BLD: 4.42 M/UL (ref 3.95–5.11)
RBC # BLD: ABNORMAL 10*6/UL
RETIC %: 2.9 % (ref 0.5–1.9)
RETIC HEMOGLOBIN: 32.7 PG (ref 28.2–35.7)
RHEUMATOID FACTOR: 10.7 IU/ML
SEG NEUTROPHILS: 61 % (ref 36–65)
SEGMENTED NEUTROPHILS ABSOLUTE COUNT: 4.69 K/UL (ref 1.5–8.1)
SODIUM BLD-SCNC: 139 MMOL/L (ref 135–144)
T3 FREE: 3.52 PG/ML (ref 2.02–4.43)
THYROXINE, FREE: 0.89 NG/DL (ref 0.93–1.7)
TOTAL IRON BINDING CAPACITY: 346 UG/DL (ref 250–450)
TOTAL PROTEIN: 7.3 G/DL (ref 6.4–8.3)
TSH SERPL DL<=0.05 MIU/L-ACNC: 2.34 MIU/L (ref 0.3–5)
UNSATURATED IRON BINDING CAPACITY: 278 UG/DL (ref 112–347)
WBC # BLD: 7.5 K/UL (ref 3.5–11.3)
WBC # BLD: ABNORMAL 10*3/UL

## 2019-07-29 PROCEDURE — 80053 COMPREHEN METABOLIC PANEL: CPT

## 2019-07-29 PROCEDURE — 86147 CARDIOLIPIN ANTIBODY EA IG: CPT

## 2019-07-29 PROCEDURE — 85730 THROMBOPLASTIN TIME PARTIAL: CPT

## 2019-07-29 PROCEDURE — 85045 AUTOMATED RETICULOCYTE COUNT: CPT

## 2019-07-29 PROCEDURE — 86431 RHEUMATOID FACTOR QUANT: CPT

## 2019-07-29 PROCEDURE — 83540 ASSAY OF IRON: CPT

## 2019-07-29 PROCEDURE — 84443 ASSAY THYROID STIM HORMONE: CPT

## 2019-07-29 PROCEDURE — 85025 COMPLETE CBC W/AUTO DIFF WBC: CPT

## 2019-07-29 PROCEDURE — 83550 IRON BINDING TEST: CPT

## 2019-07-29 PROCEDURE — 82728 ASSAY OF FERRITIN: CPT

## 2019-07-29 PROCEDURE — 36415 COLL VENOUS BLD VENIPUNCTURE: CPT

## 2019-07-29 PROCEDURE — 84439 ASSAY OF FREE THYROXINE: CPT

## 2019-07-29 PROCEDURE — 99213 OFFICE O/P EST LOW 20 MIN: CPT | Performed by: FAMILY MEDICINE

## 2019-07-29 PROCEDURE — 84481 FREE ASSAY (FT-3): CPT

## 2019-07-29 PROCEDURE — 85610 PROTHROMBIN TIME: CPT

## 2019-07-29 PROCEDURE — 85613 RUSSELL VIPER VENOM DILUTED: CPT

## 2019-07-30 ENCOUNTER — HOSPITAL ENCOUNTER (OUTPATIENT)
Dept: MRI IMAGING | Facility: CLINIC | Age: 49
Discharge: HOME | End: 2019-08-01
Payer: COMMERCIAL

## 2019-07-30 DIAGNOSIS — M79.604 LUMBAR PAIN WITH RADIATION DOWN RIGHT LEG: ICD-10-CM

## 2019-07-30 DIAGNOSIS — M51.36 BULGING OF LUMBAR INTERVERTEBRAL DISC: ICD-10-CM

## 2019-07-30 DIAGNOSIS — M54.50 LUMBAR PAIN WITH RADIATION DOWN RIGHT LEG: ICD-10-CM

## 2019-07-30 LAB
ANTICARDIOLIPIN IGA ANTIBODY: 1.5 APU
ANTICARDIOLIPIN IGG ANTIBODY: 2.6 GPU
CARDIOLIPIN AB IGM: 36.3 MPU
DILUTE RUSSELL VIPER VENOM TIME: ABNORMAL
INR BLD: 1
LUPUS ANTICOAG: ABNORMAL
PARTIAL THROMBOPLASTIN TIME: 22.7 SEC (ref 21.3–31.3)
PROTHROMBIN TIME: 9.9 SEC (ref 9.4–12.6)

## 2019-07-30 PROCEDURE — 72158 MRI LUMBAR SPINE W/O & W/DYE: CPT

## 2019-07-30 PROCEDURE — A9579 GAD-BASE MR CONTRAST NOS,1ML: HCPCS | Performed by: FAMILY MEDICINE

## 2019-07-30 PROCEDURE — 6360000004 HC RX CONTRAST MEDICATION: Performed by: FAMILY MEDICINE

## 2019-07-30 RX ADMIN — GADOTERIDOL 20 ML: 279.3 INJECTION, SOLUTION INTRAVENOUS at 15:04

## 2019-08-02 ENCOUNTER — TELEPHONE (OUTPATIENT)
Dept: FAMILY MEDICINE CLINIC | Age: 49
End: 2019-08-02

## 2019-08-05 ENCOUNTER — TELEPHONE (OUTPATIENT)
Dept: FAMILY MEDICINE CLINIC | Age: 49
End: 2019-08-05

## 2019-08-06 ENCOUNTER — HOSPITAL ENCOUNTER (OUTPATIENT)
Facility: CLINIC | Age: 49
Discharge: HOME | End: 2019-08-06
Payer: COMMERCIAL

## 2019-08-06 PROCEDURE — 82785 ASSAY OF IGE: CPT

## 2019-08-06 PROCEDURE — 86003 ALLG SPEC IGE CRUDE XTRC EA: CPT

## 2019-08-06 PROCEDURE — 82787 IGG 1 2 3 OR 4 EACH: CPT

## 2019-08-06 PROCEDURE — 86317 IMMUNOASSAY INFECTIOUS AGENT: CPT

## 2019-08-06 PROCEDURE — 86038 ANTINUCLEAR ANTIBODIES: CPT

## 2019-08-06 PROCEDURE — 82728 ASSAY OF FERRITIN: CPT

## 2019-08-06 PROCEDURE — 36415 COLL VENOUS BLD VENIPUNCTURE: CPT

## 2019-08-06 PROCEDURE — 82306 VITAMIN D 25 HYDROXY: CPT

## 2019-08-06 PROCEDURE — 82784 ASSAY IGA/IGD/IGG/IGM EACH: CPT

## 2019-08-06 PROCEDURE — 86606 ASPERGILLUS ANTIBODY: CPT

## 2019-08-07 LAB
ANTI-NUCLEAR ANTIBODY (ANA): NEGATIVE
ASPERGILLUS FUMIGATUS: <0.34 KU/L (ref 0–0.34)
FERRITIN: 55 UG/L (ref 13–150)
IGA: 143 MG/DL (ref 70–400)
IGE: 229 IU/ML
IGG: 954 MG/DL (ref 700–1600)
IGM: 238 MG/DL (ref 40–230)
VITAMIN D 25-HYDROXY: 35.8 NG/ML (ref 30–100)

## 2019-08-09 LAB
MISCELLANEOUS LAB TEST RESULT: NORMAL
TEST NAME: NORMAL

## 2019-08-10 LAB
IGG 1: 553 MG/DL (ref 240–1118)
IGG 2: 249 MG/DL (ref 124–549)
IGG 3: 46 MG/DL (ref 21–134)
IGG 4: 25 MG/DL (ref 1–123)
PNEUMOCOCCAL ANTIBODY TYPE 12: 0.17 UG/ML
PNEUMOCOCCAL ANTIBODY TYPE 14: 3.64 UG/ML
PNEUMOCOCCAL ANTIBODY TYPE 18: 5.36 UG/ML
PNEUMOCOCCAL ANTIBODY TYPE 19F: 12.9 UG/ML
PNEUMOCOCCAL ANTIBODY TYPE 1: 0.66 UG/ML
PNEUMOCOCCAL ANTIBODY TYPE 23: 3.8 UG/ML
PNEUMOCOCCAL ANTIBODY TYPE 3: 0.2 UG/ML
PNEUMOCOCCAL ANTIBODY TYPE 4: 0.09 UG/ML
PNEUMOCOCCAL ANTIBODY TYPE 5: 13.71 UG/ML
PNEUMOCOCCAL ANTIBODY TYPE 6B: 0.27 UG/ML
PNEUMOCOCCAL ANTIBODY TYPE 7F: 3.6 UG/ML
PNEUMOCOCCAL ANTIBODY TYPE 8: 0.19 UG/ML
PNEUMOCOCCAL ANTIBODY TYPE 9N: 0.45 UG/ML
PNEUMOCOCCAL ANTIBODY TYPE 9V: 1.05 UG/ML
PNEUMOCOCCAL INTERPRETATION: NORMAL
S. PNEUM TYPE 19A,IGG: 9.7 UG/ML
S. PNEUM TYPE 2,IGG: 0.28 UG/ML
S. PNEUM TYPE 20,IGG: 6.33 UG/ML
S. PNEUM TYPE 22F,IGG: 2.29 UG/ML
S. PNEUM TYPE 33F,IGG: 2.04 UG/ML
STREP PNEUMO TYPE 10A: 23.14 UG/ML
STREP PNEUMO TYPE 11A: 12.04 UG/ML
STREP PNEUMO TYPE 15B: 1.5 UG/ML
STREP PNEUMO TYPE 17F: 8.21 UG/ML

## 2019-08-14 ENCOUNTER — HOSPITAL ENCOUNTER (OUTPATIENT)
Dept: ULTRASOUND IMAGING | Facility: CLINIC | Age: 49
Discharge: HOME | End: 2019-08-16
Payer: COMMERCIAL

## 2019-08-14 DIAGNOSIS — R93.5 ABNORMAL CT OF THE ABDOMEN: ICD-10-CM

## 2019-08-14 PROCEDURE — 76705 ECHO EXAM OF ABDOMEN: CPT

## 2019-09-13 ENCOUNTER — OFFICE VISIT (OUTPATIENT)
Dept: ORTHOPEDIC SURGERY | Age: 49
End: 2019-09-13
Payer: COMMERCIAL

## 2019-09-13 VITALS — BODY MASS INDEX: 30.12 KG/M2 | WEIGHT: 170 LBS | HEIGHT: 63 IN

## 2019-09-13 DIAGNOSIS — M65.311 TRIGGER THUMB OF RIGHT HAND: Primary | ICD-10-CM

## 2019-09-13 PROCEDURE — 99213 OFFICE O/P EST LOW 20 MIN: CPT | Performed by: ORTHOPAEDIC SURGERY

## 2019-09-14 PROBLEM — M65.311 TRIGGER THUMB OF RIGHT HAND: Status: ACTIVE | Noted: 2019-09-14

## 2019-10-02 ENCOUNTER — HOSPITAL ENCOUNTER (OUTPATIENT)
Dept: PREADMISSION TESTING | Age: 49
Discharge: HOME | End: 2019-10-06
Payer: COMMERCIAL

## 2019-10-02 VITALS
TEMPERATURE: 98.2 F | DIASTOLIC BLOOD PRESSURE: 77 MMHG | OXYGEN SATURATION: 100 % | RESPIRATION RATE: 20 BRPM | WEIGHT: 172 LBS | BODY MASS INDEX: 30.48 KG/M2 | SYSTOLIC BLOOD PRESSURE: 138 MMHG | HEIGHT: 63 IN | HEART RATE: 72 BPM

## 2019-10-02 LAB
ABSOLUTE EOS #: 0.2 K/UL (ref 0–0.4)
ABSOLUTE IMMATURE GRANULOCYTE: NORMAL K/UL (ref 0–0.3)
ABSOLUTE LYMPH #: 2.1 K/UL (ref 1–4.8)
ABSOLUTE MONO #: 0.4 K/UL (ref 0.1–1.3)
ANION GAP SERPL CALCULATED.3IONS-SCNC: 10 MMOL/L (ref 9–17)
BASOPHILS # BLD: 1 % (ref 0–2)
BASOPHILS ABSOLUTE: 0 K/UL (ref 0–0.2)
BUN BLDV-MCNC: 9 MG/DL (ref 6–20)
BUN/CREAT BLD: ABNORMAL (ref 9–20)
CALCIUM SERPL-MCNC: 9.7 MG/DL (ref 8.6–10.4)
CHLORIDE BLD-SCNC: 105 MMOL/L (ref 98–107)
CO2: 25 MMOL/L (ref 20–31)
CREAT SERPL-MCNC: 0.83 MG/DL (ref 0.5–0.9)
DIFFERENTIAL TYPE: NORMAL
EOSINOPHILS RELATIVE PERCENT: 3 % (ref 0–4)
GFR AFRICAN AMERICAN: >60 ML/MIN
GFR NON-AFRICAN AMERICAN: >60 ML/MIN
GFR SERPL CREATININE-BSD FRML MDRD: ABNORMAL ML/MIN/{1.73_M2}
GFR SERPL CREATININE-BSD FRML MDRD: ABNORMAL ML/MIN/{1.73_M2}
GLUCOSE BLD-MCNC: 173 MG/DL (ref 70–99)
HCT VFR BLD CALC: 39.5 % (ref 36–46)
HEMOGLOBIN: 13.3 G/DL (ref 12–16)
IMMATURE GRANULOCYTES: NORMAL %
LYMPHOCYTES # BLD: 31 % (ref 24–44)
MCH RBC QN AUTO: 28.8 PG (ref 26–34)
MCHC RBC AUTO-ENTMCNC: 33.6 G/DL (ref 31–37)
MCV RBC AUTO: 85.8 FL (ref 80–100)
MONOCYTES # BLD: 5 % (ref 1–7)
NRBC AUTOMATED: NORMAL PER 100 WBC
PDW BLD-RTO: 13.6 % (ref 11.5–14.9)
PLATELET # BLD: 155 K/UL (ref 150–450)
PLATELET ESTIMATE: NORMAL
PMV BLD AUTO: 9.2 FL (ref 6–12)
POTASSIUM SERPL-SCNC: 4.1 MMOL/L (ref 3.7–5.3)
RBC # BLD: 4.61 M/UL (ref 4–5.2)
RBC # BLD: NORMAL 10*6/UL
SEG NEUTROPHILS: 60 % (ref 36–66)
SEGMENTED NEUTROPHILS ABSOLUTE COUNT: 4.1 K/UL (ref 1.3–9.1)
SODIUM BLD-SCNC: 140 MMOL/L (ref 135–144)
WBC # BLD: 6.8 K/UL (ref 3.5–11)
WBC # BLD: NORMAL 10*3/UL

## 2019-10-02 PROCEDURE — 85025 COMPLETE CBC W/AUTO DIFF WBC: CPT

## 2019-10-02 PROCEDURE — 80048 BASIC METABOLIC PNL TOTAL CA: CPT

## 2019-10-02 PROCEDURE — 36415 COLL VENOUS BLD VENIPUNCTURE: CPT

## 2019-10-02 RX ORDER — VITAMIN E 268 MG
400 CAPSULE ORAL DAILY
COMMUNITY
End: 2021-06-04

## 2019-10-03 ENCOUNTER — OFFICE VISIT (OUTPATIENT)
Dept: ORTHOPEDIC SURGERY | Age: 49
End: 2019-10-03
Payer: COMMERCIAL

## 2019-10-03 DIAGNOSIS — M65.311 TRIGGER THUMB OF RIGHT HAND: Primary | ICD-10-CM

## 2019-10-03 PROCEDURE — 99213 OFFICE O/P EST LOW 20 MIN: CPT | Performed by: ORTHOPAEDIC SURGERY

## 2019-10-03 RX ORDER — HYDROCODONE BITARTRATE AND ACETAMINOPHEN 5; 325 MG/1; MG/1
1 TABLET ORAL EVERY 4 HOURS
Qty: 42 TABLET | Refills: 0 | Status: SHIPPED | OUTPATIENT
Start: 2019-10-03 | End: 2019-10-10

## 2019-10-03 RX ORDER — ONDANSETRON 4 MG/1
4 TABLET, FILM COATED ORAL DAILY PRN
Qty: 20 TABLET | Refills: 0 | Status: SHIPPED | OUTPATIENT
Start: 2019-10-03 | End: 2021-08-13

## 2019-10-07 RX ORDER — SODIUM CHLORIDE 0.9 % (FLUSH) 0.9 %
10 SYRINGE (ML) INJECTION PRN
Status: CANCELLED | OUTPATIENT
Start: 2019-10-07

## 2019-10-07 RX ORDER — ACETAMINOPHEN 325 MG/1
1000 TABLET ORAL ONCE
Status: CANCELLED | OUTPATIENT
Start: 2019-10-07 | End: 2019-10-07

## 2019-10-07 RX ORDER — SODIUM CHLORIDE 0.9 % (FLUSH) 0.9 %
10 SYRINGE (ML) INJECTION EVERY 12 HOURS SCHEDULED
Status: CANCELLED | OUTPATIENT
Start: 2019-10-07

## 2019-10-09 ENCOUNTER — HOSPITAL ENCOUNTER (OUTPATIENT)
Age: 49
Setting detail: OUTPATIENT SURGERY
Discharge: HOME | End: 2019-10-09
Attending: ORTHOPAEDIC SURGERY | Admitting: ORTHOPAEDIC SURGERY
Payer: COMMERCIAL

## 2019-10-09 ENCOUNTER — ANESTHESIA EVENT (OUTPATIENT)
Dept: OPERATING ROOM | Age: 49
End: 2019-10-09
Payer: COMMERCIAL

## 2019-10-09 ENCOUNTER — ANESTHESIA (OUTPATIENT)
Dept: OPERATING ROOM | Age: 49
End: 2019-10-09
Payer: COMMERCIAL

## 2019-10-09 VITALS — OXYGEN SATURATION: 99 % | SYSTOLIC BLOOD PRESSURE: 108 MMHG | DIASTOLIC BLOOD PRESSURE: 52 MMHG

## 2019-10-09 VITALS
OXYGEN SATURATION: 97 % | SYSTOLIC BLOOD PRESSURE: 128 MMHG | BODY MASS INDEX: 30.48 KG/M2 | TEMPERATURE: 96.2 F | HEIGHT: 63 IN | DIASTOLIC BLOOD PRESSURE: 76 MMHG | WEIGHT: 172 LBS | HEART RATE: 57 BPM | RESPIRATION RATE: 16 BRPM

## 2019-10-09 LAB
-: NORMAL
GLUCOSE BLD-MCNC: 126 MG/DL (ref 65–105)
HCG, PREGNANCY URINE (POC): NEGATIVE

## 2019-10-09 PROCEDURE — 7100000000 HC PACU RECOVERY - FIRST 15 MIN: Performed by: ORTHOPAEDIC SURGERY

## 2019-10-09 PROCEDURE — 6360000002 HC RX W HCPCS: Performed by: ORTHOPAEDIC SURGERY

## 2019-10-09 PROCEDURE — 6360000002 HC RX W HCPCS: Performed by: NURSE ANESTHETIST, CERTIFIED REGISTERED

## 2019-10-09 PROCEDURE — 3700000000 HC ANESTHESIA ATTENDED CARE: Performed by: ORTHOPAEDIC SURGERY

## 2019-10-09 PROCEDURE — 2500000003 HC RX 250 WO HCPCS: Performed by: ORTHOPAEDIC SURGERY

## 2019-10-09 PROCEDURE — 2709999900 HC NON-CHARGEABLE SUPPLY: Performed by: ORTHOPAEDIC SURGERY

## 2019-10-09 PROCEDURE — 7100000031 HC ASPR PHASE II RECOVERY - ADDTL 15 MIN: Performed by: ORTHOPAEDIC SURGERY

## 2019-10-09 PROCEDURE — 6370000000 HC RX 637 (ALT 250 FOR IP): Performed by: ORTHOPAEDIC SURGERY

## 2019-10-09 PROCEDURE — 7100000030 HC ASPR PHASE II RECOVERY - FIRST 15 MIN: Performed by: ORTHOPAEDIC SURGERY

## 2019-10-09 PROCEDURE — 2580000003 HC RX 258: Performed by: ANESTHESIOLOGY

## 2019-10-09 PROCEDURE — 3600000012 HC SURGERY LEVEL 2 ADDTL 15MIN: Performed by: ORTHOPAEDIC SURGERY

## 2019-10-09 PROCEDURE — 82947 ASSAY GLUCOSE BLOOD QUANT: CPT

## 2019-10-09 PROCEDURE — 7100000001 HC PACU RECOVERY - ADDTL 15 MIN: Performed by: ORTHOPAEDIC SURGERY

## 2019-10-09 PROCEDURE — 26055 INCISE FINGER TENDON SHEATH: CPT | Performed by: ORTHOPAEDIC SURGERY

## 2019-10-09 PROCEDURE — 3600000002 HC SURGERY LEVEL 2 BASE: Performed by: ORTHOPAEDIC SURGERY

## 2019-10-09 PROCEDURE — 3700000001 HC ADD 15 MINUTES (ANESTHESIA): Performed by: ORTHOPAEDIC SURGERY

## 2019-10-09 PROCEDURE — 81025 URINE PREGNANCY TEST: CPT

## 2019-10-09 RX ORDER — HYDROCODONE BITARTRATE AND ACETAMINOPHEN 5; 325 MG/1; MG/1
1 TABLET ORAL PRN
Status: DISCONTINUED | OUTPATIENT
Start: 2019-10-09 | End: 2019-10-09 | Stop reason: HOSPADM

## 2019-10-09 RX ORDER — SODIUM CHLORIDE 9 MG/ML
INJECTION, SOLUTION INTRAVENOUS CONTINUOUS
Status: DISCONTINUED | OUTPATIENT
Start: 2019-10-09 | End: 2019-10-09 | Stop reason: HOSPADM

## 2019-10-09 RX ORDER — DOXYCYCLINE HYCLATE 100 MG/1
100 CAPSULE ORAL 2 TIMES DAILY
COMMUNITY
Start: 2019-10-08 | End: 2019-10-18

## 2019-10-09 RX ORDER — HYDRALAZINE HYDROCHLORIDE 20 MG/ML
5 INJECTION INTRAMUSCULAR; INTRAVENOUS EVERY 10 MIN PRN
Status: DISCONTINUED | OUTPATIENT
Start: 2019-10-09 | End: 2019-10-09 | Stop reason: HOSPADM

## 2019-10-09 RX ORDER — PROPOFOL 10 MG/ML
INJECTION, EMULSION INTRAVENOUS PRN
Status: DISCONTINUED | OUTPATIENT
Start: 2019-10-09 | End: 2019-10-09 | Stop reason: SDUPTHER

## 2019-10-09 RX ORDER — DEXAMETHASONE SODIUM PHOSPHATE 10 MG/ML
10 INJECTION, SOLUTION INTRAMUSCULAR; INTRAVENOUS ONCE
Status: COMPLETED | OUTPATIENT
Start: 2019-10-09 | End: 2019-10-09

## 2019-10-09 RX ORDER — BUPIVACAINE HYDROCHLORIDE 5 MG/ML
INJECTION, SOLUTION EPIDURAL; INTRACAUDAL PRN
Status: DISCONTINUED | OUTPATIENT
Start: 2019-10-09 | End: 2019-10-09 | Stop reason: ALTCHOICE

## 2019-10-09 RX ORDER — ACETAMINOPHEN 500 MG
1000 TABLET ORAL ONCE
Status: COMPLETED | OUTPATIENT
Start: 2019-10-09 | End: 2019-10-09

## 2019-10-09 RX ORDER — MORPHINE SULFATE 2 MG/ML
2 INJECTION, SOLUTION INTRAMUSCULAR; INTRAVENOUS EVERY 5 MIN PRN
Status: DISCONTINUED | OUTPATIENT
Start: 2019-10-09 | End: 2019-10-09 | Stop reason: HOSPADM

## 2019-10-09 RX ORDER — FENTANYL CITRATE 50 UG/ML
INJECTION, SOLUTION INTRAMUSCULAR; INTRAVENOUS PRN
Status: DISCONTINUED | OUTPATIENT
Start: 2019-10-09 | End: 2019-10-09 | Stop reason: SDUPTHER

## 2019-10-09 RX ORDER — ONDANSETRON 2 MG/ML
4 INJECTION INTRAMUSCULAR; INTRAVENOUS
Status: DISCONTINUED | OUTPATIENT
Start: 2019-10-09 | End: 2019-10-09 | Stop reason: HOSPADM

## 2019-10-09 RX ORDER — MEPERIDINE HYDROCHLORIDE 25 MG/ML
12.5 INJECTION INTRAMUSCULAR; INTRAVENOUS; SUBCUTANEOUS EVERY 5 MIN PRN
Status: DISCONTINUED | OUTPATIENT
Start: 2019-10-09 | End: 2019-10-09 | Stop reason: HOSPADM

## 2019-10-09 RX ORDER — LABETALOL 20 MG/4 ML (5 MG/ML) INTRAVENOUS SYRINGE
5 EVERY 10 MIN PRN
Status: DISCONTINUED | OUTPATIENT
Start: 2019-10-09 | End: 2019-10-09 | Stop reason: HOSPADM

## 2019-10-09 RX ORDER — ONDANSETRON 2 MG/ML
INJECTION INTRAMUSCULAR; INTRAVENOUS PRN
Status: DISCONTINUED | OUTPATIENT
Start: 2019-10-09 | End: 2019-10-09 | Stop reason: SDUPTHER

## 2019-10-09 RX ORDER — PROPOFOL 10 MG/ML
INJECTION, EMULSION INTRAVENOUS CONTINUOUS PRN
Status: DISCONTINUED | OUTPATIENT
Start: 2019-10-09 | End: 2019-10-09 | Stop reason: SDUPTHER

## 2019-10-09 RX ORDER — METOCLOPRAMIDE HYDROCHLORIDE 5 MG/ML
10 INJECTION INTRAMUSCULAR; INTRAVENOUS
Status: DISCONTINUED | OUTPATIENT
Start: 2019-10-09 | End: 2019-10-09 | Stop reason: HOSPADM

## 2019-10-09 RX ORDER — DIPHENHYDRAMINE HYDROCHLORIDE 50 MG/ML
12.5 INJECTION INTRAMUSCULAR; INTRAVENOUS
Status: DISCONTINUED | OUTPATIENT
Start: 2019-10-09 | End: 2019-10-09 | Stop reason: HOSPADM

## 2019-10-09 RX ORDER — FENTANYL CITRATE 50 UG/ML
50 INJECTION, SOLUTION INTRAMUSCULAR; INTRAVENOUS EVERY 5 MIN PRN
Status: DISCONTINUED | OUTPATIENT
Start: 2019-10-09 | End: 2019-10-09 | Stop reason: HOSPADM

## 2019-10-09 RX ORDER — SODIUM CHLORIDE 0.9 % (FLUSH) 0.9 %
10 SYRINGE (ML) INJECTION EVERY 12 HOURS SCHEDULED
Status: DISCONTINUED | OUTPATIENT
Start: 2019-10-09 | End: 2019-10-09 | Stop reason: HOSPADM

## 2019-10-09 RX ORDER — SODIUM CHLORIDE 0.9 % (FLUSH) 0.9 %
10 SYRINGE (ML) INJECTION PRN
Status: DISCONTINUED | OUTPATIENT
Start: 2019-10-09 | End: 2019-10-09 | Stop reason: HOSPADM

## 2019-10-09 RX ORDER — HYDROCODONE BITARTRATE AND ACETAMINOPHEN 5; 325 MG/1; MG/1
2 TABLET ORAL PRN
Status: DISCONTINUED | OUTPATIENT
Start: 2019-10-09 | End: 2019-10-09 | Stop reason: HOSPADM

## 2019-10-09 RX ORDER — FENTANYL CITRATE 50 UG/ML
25 INJECTION, SOLUTION INTRAMUSCULAR; INTRAVENOUS EVERY 5 MIN PRN
Status: DISCONTINUED | OUTPATIENT
Start: 2019-10-09 | End: 2019-10-09 | Stop reason: HOSPADM

## 2019-10-09 RX ORDER — CLINDAMYCIN PHOSPHATE 900 MG/50ML
900 INJECTION INTRAVENOUS
Status: COMPLETED | OUTPATIENT
Start: 2019-10-09 | End: 2019-10-09

## 2019-10-09 RX ADMIN — FENTANYL CITRATE 50 MCG: 50 INJECTION, SOLUTION INTRAMUSCULAR; INTRAVENOUS at 09:01

## 2019-10-09 RX ADMIN — ONDANSETRON 4 MG: 2 INJECTION INTRAMUSCULAR; INTRAVENOUS at 09:19

## 2019-10-09 RX ADMIN — ACETAMINOPHEN 1000 MG: 500 TABLET, FILM COATED ORAL at 08:09

## 2019-10-09 RX ADMIN — PROPOFOL 100 MG: 10 INJECTION, EMULSION INTRAVENOUS at 08:57

## 2019-10-09 RX ADMIN — FENTANYL CITRATE 50 MCG: 50 INJECTION, SOLUTION INTRAMUSCULAR; INTRAVENOUS at 09:08

## 2019-10-09 RX ADMIN — SODIUM CHLORIDE: 9 INJECTION, SOLUTION INTRAVENOUS at 08:24

## 2019-10-09 RX ADMIN — CLINDAMYCIN PHOSPHATE 900 MG: 900 INJECTION, SOLUTION INTRAVENOUS at 09:02

## 2019-10-09 RX ADMIN — PROPOFOL 200 MCG/KG/MIN: 10 INJECTION, EMULSION INTRAVENOUS at 08:57

## 2019-10-09 RX ADMIN — DEXAMETHASONE SODIUM PHOSPHATE 10 MG: 10 INJECTION, SOLUTION INTRAMUSCULAR; INTRAVENOUS at 09:03

## 2019-10-09 ASSESSMENT — PULMONARY FUNCTION TESTS
PIF_VALUE: 0

## 2019-10-09 ASSESSMENT — ENCOUNTER SYMPTOMS: STRIDOR: 0

## 2019-10-09 ASSESSMENT — PAIN - FUNCTIONAL ASSESSMENT: PAIN_FUNCTIONAL_ASSESSMENT: 0-10

## 2019-10-09 ASSESSMENT — PAIN SCALES - GENERAL
PAINLEVEL_OUTOF10: 3
PAINLEVEL_OUTOF10: 0
PAINLEVEL_OUTOF10: 0

## 2019-10-25 ENCOUNTER — OFFICE VISIT (OUTPATIENT)
Dept: ORTHOPEDIC SURGERY | Age: 49
End: 2019-10-25

## 2019-10-25 VITALS — WEIGHT: 172 LBS | HEIGHT: 63 IN | BODY MASS INDEX: 30.48 KG/M2

## 2019-10-25 DIAGNOSIS — R52 PAIN: ICD-10-CM

## 2019-10-25 DIAGNOSIS — M65.311 TRIGGER THUMB OF RIGHT HAND: Primary | ICD-10-CM

## 2019-10-25 PROCEDURE — 99024 POSTOP FOLLOW-UP VISIT: CPT | Performed by: ORTHOPAEDIC SURGERY

## 2019-10-25 RX ORDER — AMOXICILLIN 500 MG/1
500 CAPSULE ORAL 3 TIMES DAILY
Qty: 30 CAPSULE | Refills: 0 | Status: SHIPPED | OUTPATIENT
Start: 2019-10-25 | End: 2019-11-04

## 2019-10-25 RX ORDER — CEPHALEXIN 500 MG/1
500 CAPSULE ORAL 4 TIMES DAILY
Qty: 40 CAPSULE | Refills: 0 | Status: SHIPPED | OUTPATIENT
Start: 2019-10-25 | End: 2019-10-25 | Stop reason: CLARIF

## 2019-10-25 RX ORDER — IBUPROFEN 800 MG/1
800 TABLET ORAL EVERY 8 HOURS PRN
Qty: 120 TABLET | Refills: 0 | Status: SHIPPED | OUTPATIENT
Start: 2019-10-25 | End: 2019-11-16 | Stop reason: SDUPTHER

## 2019-11-01 ENCOUNTER — OFFICE VISIT (OUTPATIENT)
Dept: ORTHOPEDIC SURGERY | Age: 49
End: 2019-11-01

## 2019-11-01 DIAGNOSIS — M65.311 TRIGGER THUMB OF RIGHT HAND: Primary | ICD-10-CM

## 2019-11-01 PROCEDURE — 99024 POSTOP FOLLOW-UP VISIT: CPT | Performed by: ORTHOPAEDIC SURGERY

## 2019-11-15 ENCOUNTER — TELEPHONE (OUTPATIENT)
Dept: ADMINISTRATIVE | Age: 49
End: 2019-11-15

## 2019-12-02 ENCOUNTER — TELEPHONE (OUTPATIENT)
Dept: ORTHOPEDIC SURGERY | Age: 49
End: 2019-12-02

## 2019-12-04 ENCOUNTER — OFFICE VISIT (OUTPATIENT)
Dept: FAMILY MEDICINE CLINIC | Age: 49
End: 2019-12-04
Payer: COMMERCIAL

## 2019-12-04 VITALS
HEART RATE: 76 BPM | WEIGHT: 171.4 LBS | OXYGEN SATURATION: 98 % | BODY MASS INDEX: 30.36 KG/M2 | TEMPERATURE: 97.9 F | SYSTOLIC BLOOD PRESSURE: 135 MMHG | DIASTOLIC BLOOD PRESSURE: 86 MMHG

## 2019-12-04 DIAGNOSIS — K76.9 LIVER DISEASE, UNSPECIFIED: ICD-10-CM

## 2019-12-04 DIAGNOSIS — Z23 NEED FOR INFLUENZA VACCINATION: ICD-10-CM

## 2019-12-04 DIAGNOSIS — E11.9 TYPE 2 DIABETES MELLITUS WITHOUT COMPLICATION, WITHOUT LONG-TERM CURRENT USE OF INSULIN (HCC): Primary | ICD-10-CM

## 2019-12-04 LAB — HBA1C MFR BLD: 7.2 %

## 2019-12-04 PROCEDURE — 90686 IIV4 VACC NO PRSV 0.5 ML IM: CPT | Performed by: FAMILY MEDICINE

## 2019-12-04 PROCEDURE — 83036 HEMOGLOBIN GLYCOSYLATED A1C: CPT | Performed by: FAMILY MEDICINE

## 2019-12-04 PROCEDURE — 99213 OFFICE O/P EST LOW 20 MIN: CPT | Performed by: FAMILY MEDICINE

## 2019-12-04 PROCEDURE — 90471 IMMUNIZATION ADMIN: CPT | Performed by: FAMILY MEDICINE

## 2019-12-04 RX ORDER — LEVOFLOXACIN 5 MG/ML
INJECTION, SOLUTION INTRAVENOUS
COMMUNITY
Start: 2019-12-03 | End: 2019-12-13

## 2020-01-07 ENCOUNTER — OFFICE VISIT (OUTPATIENT)
Dept: ORTHOPEDIC SURGERY | Age: 50
End: 2020-01-07

## 2020-01-07 VITALS — WEIGHT: 171 LBS | BODY MASS INDEX: 30.3 KG/M2 | HEIGHT: 63 IN

## 2020-01-07 PROCEDURE — 99024 POSTOP FOLLOW-UP VISIT: CPT | Performed by: ORTHOPAEDIC SURGERY

## 2020-01-07 RX ORDER — GABAPENTIN 100 MG/1
150 CAPSULE ORAL 2 TIMES DAILY
Qty: 112 CAPSULE | Refills: 0 | Status: SHIPPED | OUTPATIENT
Start: 2020-01-07 | End: 2021-06-04 | Stop reason: ALTCHOICE

## 2020-01-09 NOTE — PROGRESS NOTES
Procedure: Right trigger thumb A1 pulley release  Date of procedure: 10/9/2019    HPI: Ms. Yoselin Bowman is a 51year old who is approximately 3 months sp the aforementioned procedure. She complaints of persistent numbness/decreased sensation along the ulnar border of her thumb and pain about the base of the thumb and what she describes as pressure underneath the nail. Physical examination:  Evaluation of her right thumb and upper extremity demonstrates her incision is fully healed. There is no warmth, erythema, or swelling. Sensation is diminished to light touch along the ulnar border of the thumb. She can actively flex and extend across the IP joint of the thumb. 2+ radial pulse with brisk capillary refill in all fingers. Impression and plan: Ms. Yoselin Bowman is a 51year old who is approximately 3 months sp a right trigger thumb release. Unfortunately she's dealing with diminished sensation along the ulnar border of her thumb in addition to pain at the volar base of the thumb. I had a discussion with her regarding this. It's likely due to an iatrogenic injury to the digital nerve. Unclear if she can expect any improvement. At this point I recommended starting her on neurontin to help with her pain. I also recommended obtaining a second opinion from a hand specialist regarding options/recommendations and will facilitate her getting a referral. She may continue with activities as tolerated and I'll check up on her in 6 weeks.

## 2020-02-24 ENCOUNTER — HOSPITAL ENCOUNTER (OUTPATIENT)
Facility: CLINIC | Age: 50
Discharge: HOME | End: 2020-02-24
Payer: COMMERCIAL

## 2020-02-24 ENCOUNTER — HOSPITAL ENCOUNTER (OUTPATIENT)
Dept: ULTRASOUND IMAGING | Facility: CLINIC | Age: 50
Discharge: HOME | End: 2020-02-26
Payer: COMMERCIAL

## 2020-02-24 LAB
AFP: 3.8 UG/L
ALBUMIN SERPL-MCNC: 4.2 G/DL (ref 3.5–5.2)
ALBUMIN SERPL-MCNC: 4.2 G/DL (ref 3.5–5.2)
ALBUMIN/GLOBULIN RATIO: 1.6 (ref 1–2.5)
ALBUMIN/GLOBULIN RATIO: 1.6 (ref 1–2.5)
ALP BLD-CCNC: 55 U/L (ref 35–104)
ALP BLD-CCNC: 55 U/L (ref 35–104)
ALT SERPL-CCNC: 17 U/L (ref 5–33)
ALT SERPL-CCNC: 17 U/L (ref 5–33)
ANION GAP SERPL CALCULATED.3IONS-SCNC: 10 MMOL/L (ref 9–17)
AST SERPL-CCNC: 17 U/L
AST SERPL-CCNC: 17 U/L
BILIRUB SERPL-MCNC: 0.4 MG/DL (ref 0.3–1.2)
BILIRUB SERPL-MCNC: 0.4 MG/DL (ref 0.3–1.2)
BILIRUBIN DIRECT: 0.13 MG/DL
BILIRUBIN, INDIRECT: 0.27 MG/DL (ref 0–1)
BUN BLDV-MCNC: 11 MG/DL (ref 6–20)
BUN/CREAT BLD: ABNORMAL (ref 9–20)
CALCIUM SERPL-MCNC: 9.4 MG/DL (ref 8.6–10.4)
CHLORIDE BLD-SCNC: 102 MMOL/L (ref 98–107)
CHOLESTEROL, FASTING: 217 MG/DL
CHOLESTEROL/HDL RATIO: 4
CO2: 25 MMOL/L (ref 20–31)
CREAT SERPL-MCNC: 0.85 MG/DL (ref 0.5–0.9)
CREATININE URINE: 269.4 MG/DL (ref 28–217)
GFR AFRICAN AMERICAN: >60 ML/MIN
GFR NON-AFRICAN AMERICAN: >60 ML/MIN
GFR SERPL CREATININE-BSD FRML MDRD: ABNORMAL ML/MIN/{1.73_M2}
GFR SERPL CREATININE-BSD FRML MDRD: ABNORMAL ML/MIN/{1.73_M2}
GLOBULIN: NORMAL G/DL (ref 1.5–3.8)
GLUCOSE BLD-MCNC: 120 MG/DL (ref 70–99)
HDLC SERPL-MCNC: 54 MG/DL
LDL CHOLESTEROL: 144 MG/DL (ref 0–130)
MICROALBUMIN/CREAT 24H UR: 22 MG/L
MICROALBUMIN/CREAT UR-RTO: 8 MCG/MG CREAT
POTASSIUM SERPL-SCNC: 4.1 MMOL/L (ref 3.7–5.3)
SODIUM BLD-SCNC: 137 MMOL/L (ref 135–144)
T3 FREE: 3.02 PG/ML (ref 2.02–4.43)
THYROXINE, FREE: 0.91 NG/DL (ref 0.93–1.7)
TOTAL PROTEIN: 6.9 G/DL (ref 6.4–8.3)
TOTAL PROTEIN: 6.9 G/DL (ref 6.4–8.3)
TRIGLYCERIDE, FASTING: 94 MG/DL
TSH SERPL DL<=0.05 MIU/L-ACNC: 2.83 MIU/L (ref 0.3–5)
VLDLC SERPL CALC-MCNC: ABNORMAL MG/DL (ref 1–30)

## 2020-02-24 PROCEDURE — 84481 FREE ASSAY (FT-3): CPT

## 2020-02-24 PROCEDURE — 84443 ASSAY THYROID STIM HORMONE: CPT

## 2020-02-24 PROCEDURE — 82043 UR ALBUMIN QUANTITATIVE: CPT

## 2020-02-24 PROCEDURE — 84439 ASSAY OF FREE THYROXINE: CPT

## 2020-02-24 PROCEDURE — 80061 LIPID PANEL: CPT

## 2020-02-24 PROCEDURE — 80076 HEPATIC FUNCTION PANEL: CPT

## 2020-02-24 PROCEDURE — 82570 ASSAY OF URINE CREATININE: CPT

## 2020-02-24 PROCEDURE — 82248 BILIRUBIN DIRECT: CPT

## 2020-02-24 PROCEDURE — 80053 COMPREHEN METABOLIC PANEL: CPT

## 2020-02-24 PROCEDURE — 82105 ALPHA-FETOPROTEIN SERUM: CPT

## 2020-02-24 PROCEDURE — 36415 COLL VENOUS BLD VENIPUNCTURE: CPT

## 2020-02-24 PROCEDURE — 76705 ECHO EXAM OF ABDOMEN: CPT

## 2020-04-23 ENCOUNTER — TELEMEDICINE (OUTPATIENT)
Dept: FAMILY MEDICINE CLINIC | Age: 50
End: 2020-04-23
Payer: COMMERCIAL

## 2020-04-23 ENCOUNTER — NURSE TRIAGE (OUTPATIENT)
Dept: OTHER | Facility: CLINIC | Age: 50
End: 2020-04-23

## 2020-04-23 PROCEDURE — 99213 OFFICE O/P EST LOW 20 MIN: CPT | Performed by: FAMILY MEDICINE

## 2020-04-23 ASSESSMENT — PATIENT HEALTH QUESTIONNAIRE - PHQ9
2. FEELING DOWN, DEPRESSED OR HOPELESS: 0
1. LITTLE INTEREST OR PLEASURE IN DOING THINGS: 0
SUM OF ALL RESPONSES TO PHQ QUESTIONS 1-9: 0
SUM OF ALL RESPONSES TO PHQ QUESTIONS 1-9: 0
SUM OF ALL RESPONSES TO PHQ9 QUESTIONS 1 & 2: 0

## 2020-04-23 NOTE — PROGRESS NOTES
tablet Take 1 tablet by mouth 2 times daily as needed for Anxiety 20 tablet 0    TRUETEST TEST strip   0    TRUEPLUS LANCETS 33G MISC   0    albuterol (PROVENTIL) (2.5 MG/3ML) 0.083% nebulizer solution Take 3 mLs by nebulization every 6 hours as needed for Wheezing 120 each 3    albuterol (PROVENTIL HFA;VENTOLIN HFA) 108 (90 BASE) MCG/ACT inhaler Inhale 2 puffs into the lungs every 6 hours as needed. 1 Inhaler 3     No current facility-administered medications for this visit. Facility-Administered Medications Ordered in Other Visits   Medication Dose Route Frequency Provider Last Rate Last Dose    nitroGLYCERIN (NITROSTAT) SL tablet 0.4 mg  0.4 mg Sublingual Q5 Min PRN Nakia Saini MD   0.4 mg at 09/16/16 0857    0.9 % sodium chloride infusion   Intravenous Continuous Nakia Saini MD 20 mL/hr at 09/16/16 8994       ALLERGIES:    Allergies   Allergen Reactions    Bactrim [Sulfamethoxazole-Trimethoprim]     Biaxin [Clarithromycin]     Ceclor [Cefaclor]     Vancomycin      Itching, burning       Social History     Tobacco Use    Smoking status: Current Every Day Smoker     Packs/day: 0.50     Years: 20.00     Pack years: 10.00     Types: Cigarettes    Smokeless tobacco: Never Used   Substance Use Topics    Alcohol use: No     Alcohol/week: 0.0 standard drinks      There is no height or weight on file to calculate BMI. There were no vitals taken for this visit. Subjective:      HPI    50 yo female reaching out today for telemedicine visit. patient tells me 3 days ago she started to have a right upper quadrant discomfort. She says she had one episode of severe time but sometimes the pain is just mild. Just below and at her right rib cage. She said especially she has a lot of pain when taking a deep breath scratching to the left side does feels better. Patient feels that she has usually and so when she reveals releases some gas better.   She says the pain is down all the time again this office for worsening conditions or problems, and seek emergency medical treatment and/or call 911 if deemed necessary. Patient identification was verified at the start of the visit: Yes    Total time spent for this encounter: Not billed by time    Services were provided through a video synchronous discussion virtually to substitute for in-person clinic visit. Patient and provider were located at their individual homes. --Javier Loja MD on 4/23/2020 at 12:55 PM    An electronic signature was used to authenticate this note. Return in about 6 months (around 10/23/2020), or if symptoms worsen or fail to improve. Orders Placed This Encounter   Procedures    AST(SGOT) & ALT(SGPT)     Standing Status:   Future     Standing Expiration Date:   4/23/2021     No orders of the defined types were placed in this encounter. Lenora received counseling on the following healthy behaviors: nutrition, exercise and medication adherence  Reviewed prior labs and health maintenance. Continue current medications, diet and exercise. Discussed use, benefit, and side effects of prescribed medications. Barriers to medication compliance addressed. Patient given educational materials - see patient instructions. All patient questions answered. Patient voiced understanding.       Electronically signed by Javier Loja MD on 4/23/2020 at 12:55 PM       (Please note that portions of this note were completed with a voice recognition program. Efforts were made to edit the dictations but occasionally words are mis-transcribed.)

## 2020-06-23 ENCOUNTER — HOSPITAL ENCOUNTER (OUTPATIENT)
Dept: ULTRASOUND IMAGING | Facility: CLINIC | Age: 50
Discharge: HOME | End: 2020-06-25
Payer: COMMERCIAL

## 2020-06-23 PROCEDURE — 76536 US EXAM OF HEAD AND NECK: CPT

## 2020-06-30 ENCOUNTER — HOSPITAL ENCOUNTER (EMERGENCY)
Facility: CLINIC | Age: 50
Discharge: HOME | End: 2020-06-30
Attending: SPECIALIST
Payer: COMMERCIAL

## 2020-06-30 VITALS
HEIGHT: 63 IN | RESPIRATION RATE: 18 BRPM | SYSTOLIC BLOOD PRESSURE: 154 MMHG | HEART RATE: 78 BPM | WEIGHT: 173 LBS | OXYGEN SATURATION: 97 % | TEMPERATURE: 98.1 F | DIASTOLIC BLOOD PRESSURE: 95 MMHG | BODY MASS INDEX: 30.65 KG/M2

## 2020-06-30 PROCEDURE — 99283 EMERGENCY DEPT VISIT LOW MDM: CPT

## 2020-06-30 PROCEDURE — 2500000003 HC RX 250 WO HCPCS: Performed by: SPECIALIST

## 2020-06-30 PROCEDURE — 6370000000 HC RX 637 (ALT 250 FOR IP): Performed by: SPECIALIST

## 2020-06-30 RX ORDER — DOXYCYCLINE HYCLATE 100 MG
TABLET ORAL
COMMUNITY
End: 2021-01-25 | Stop reason: SDUPTHER

## 2020-06-30 RX ORDER — GINSENG 100 MG
CAPSULE ORAL ONCE
Status: COMPLETED | OUTPATIENT
Start: 2020-06-30 | End: 2020-06-30

## 2020-06-30 RX ORDER — FLUTICASONE PROPIONATE 50 MCG
SPRAY, SUSPENSION (ML) NASAL
COMMUNITY
End: 2022-02-18

## 2020-06-30 RX ORDER — LIDOCAINE HYDROCHLORIDE 10 MG/ML
5 INJECTION, SOLUTION INFILTRATION; PERINEURAL ONCE
Status: COMPLETED | OUTPATIENT
Start: 2020-06-30 | End: 2020-06-30

## 2020-06-30 RX ADMIN — BACITRACIN: 500 OINTMENT TOPICAL at 23:24

## 2020-06-30 RX ADMIN — LIDOCAINE HYDROCHLORIDE 5 ML: 10 INJECTION, SOLUTION INFILTRATION; PERINEURAL at 23:24

## 2020-06-30 ASSESSMENT — PAIN SCALES - GENERAL: PAINLEVEL_OUTOF10: 4

## 2020-07-01 NOTE — ED TRIAGE NOTES
PTA pt was packing the tackle box and got a fish hook embedded into the left anterior side of the first phalange.  Not bleeding pms intact

## 2020-07-01 NOTE — ED PROVIDER NOTES
[facility] ED  [address] [facility]  Phone: [phone]      Pt Name: eZv Brink  MRN: [id number]  [place] 1970  Date of evaluation: 6/30/2020      CHIEF COMPLAINT       Chief Complaint   Patient presents with    Foreign Body in Skin     30 mins left index finger          HISTORY OF PRESENT ILLNESS    Zev Brink is a 53 y.o. female who presents   Chief Complaint   Patient presents with    Foreign Body in Skin     30 mins left index finger    . 20-year-old female patient presents to the emergency department for evaluation and management of fishhook embedded on the volar aspect of the left index finger about 30 minutes prior to arrival.  Patient states she was packing the tackle box and the fishhook went into her left index finger and got embedded on the volar aspect. She denies any tingling, numbness or weakness distally. Last tetanus injection is up-to-date. She grades discomfort as 4 out of 10 in intensity in the left index finger. Patient is right-hand dominant and does not take any anticoagulants or antiplatelet agents. There are no exacerbating or relieving factors and patient has not taken any medications for the above symptoms. REVIEW OF SYSTEMS       Review of Systems    All systems reviewed and negative unless noted in HPI. The patient denies fever or constitutional symptoms. Denies any sore throat or rhinorrhea. Denies any neck pain or stiffness. Denies chest pain or shortness of breath. Denies any dysuria. Denies urinary frequency or hematuria. Denies any weakness, numbness or focal neurologic deficit. PAST MEDICAL HISTORY    has a past medical history of Anomalous coronary artery origin, Anxiety, Asthma, Back pain, DDD (degenerative disc disease), cervical, DDD (degenerative disc disease), lumbar, Diabetes mellitus ([facility] [address] Fatty liver, Mitral prolapse, Pneumonia, Thoracic outlet syndrome, and Thyroid goiter.     SURGICAL HISTORY has a past surgical history that includes Tubal ligation;  section; Cervical spine surgery; hernia repair; Colonoscopy; Endoscopy, colon, diagnostic; bronchoscopy; Cardiac surgery; Tonsillectomy; pr sono guide needle biopsy; and Finger trigger release (Right, 10/9/2019). CURRENT MEDICATIONS       Discharge Medication List as of 2020 11:20 PM      CONTINUE these medications which have NOT CHANGED    Details   fluticasone (FLONASE ALLERGY RELIEF) 50 MCG/ACT nasal spray 1 spray in each nostrilHistorical Med      doxycycline hyclate (VIBRA-TABS) 100 MG tablet doxycycline hyclate 100 mg tablet   TAKE 1 TABLET BY MOUTH TWICE A DAY FOR 10 DAYSHistorical Med      gabapentin (NEURONTIN) 100 MG capsule Take 2 capsules by mouth 2 times daily for 28 days.  Take 150 mg BID for 4 weeks, Disp-112 capsule, R-0Normal      mometasone-formoterol (DULERA) 100-5 MCG/ACT inhaler Inhale 2 puffs into the lungs 2 times daily Rinse mouth after using., Disp-1 Inhaler, R-2LOT: [id number]  EXP 2020Sample      ibuprofen (ADVIL;MOTRIN) 800 MG tablet TAKE 1 TABLET BY MOUTH EVERY 8 HOURS AS NEEDED FOR PAIN, Disp-30 tablet, R-1Normal      ondansetron (ZOFRAN) 4 MG tablet Take 1 tablet by mouth daily as needed for Nausea or Vomiting, Disp-20 tablet, R-0Print      vitamin E 400 UNIT capsule Take 400 Units by mouth dailyHistorical Med      senna-docusate (PERICOLACE) 8.6-50 MG per tablet TAKE TWO TABLETS BY MOUTH DAILY AS NEEDED FOR CONSTIPATION, Disp-30 tablet, R-0Normal      hydrocortisone 2.5 % cream Apply once daily to corners of mouth, Disp-60 g, R-5, Normal      ketoconazole (NIZORAL) 2 % cream Apply once daily to corners of mouth, Disp-30 g, R-2, Normal      !! albuterol sulfate HFA (VENTOLIN HFA) 108 (90 Base) MCG/ACT inhaler Inhale 2 puffs into the lungs every 6 hours as needed for Wheezing, Disp-1 Inhaler, R-3Print      nystatin (MYCOSTATIN) 933154 UNIT/ML suspension Take 5 mLs by mouth 4 times daily Swish and spit, Oral, 4 TIMES DAILY Starting Thu 2/14/2019, Disp-120 mL, R-1, Normal      Magic Mouthwash (MIRACLE MOUTHWASH) Take 15 mLs by mouth as needed Historical Med      medroxyPROGESTERone (PROVERA) 10 MG tablet Take 10 mg by mouth as needed Historical Med      SITagliptin (JANUVIA) 25 MG tablet Take 100 mg by mouth daily Historical Med      pantoprazole sodium (PROTONIX) 40 MG PACK packet Take 40 mg by mouth dailyHistorical Med      metoprolol succinate (TOPROL XL) 25 MG extended release tablet Take 2 tablets by mouth daily, Disp-60 tablet, R-3Normal      ranolazine (RANEXA) 1000 MG extended release tablet Take 1,000 mg by mouthHistorical Med      insulin glargine (TOUJEO SOLOSTAR) 300 UNIT/ML injection pen Inject 23 Units into the skin daily Historical Med      nitroGLYCERIN (NITROSTAT) 0.4 MG SL tablet Place 0.4 mg under the tongue every 5 minutes as needed for Chest pain Dissolve 1 tab under tongue at first sign of chest pain. May repeat every 5 minutes until relief is obtained. If pain persists after taking 3 tabs in a 15-minute period, or the pain i s different than is typically experienced, call 9-1-1 immediately. clonazePAM (KLONOPIN) 0.5 MG tablet Take 1 tablet by mouth 2 times daily as needed for Anxiety, Disp-20 tablet, R-0      TRUETEST TEST strip Starting 2/26/2016, Until Discontinued, R-0, MARIAH, Historical Med      TRUEPLUS LANCETS 33G MISC Starting 2/26/2016, Until Discontinued, R-0, MARIAH, Historical Med      albuterol (PROVENTIL) (2.5 MG/3ML) 0.083% nebulizer solution Take 3 mLs by nebulization every 6 hours as needed for Wheezing, Disp-120 each, R-3      !! albuterol (PROVENTIL HFA;VENTOLIN HFA) 108 (90 BASE) MCG/ACT inhaler Inhale 2 puffs into the lungs every 6 hours as needed. , Disp-1 Inhaler, R-3       !! - Potential duplicate medications found. Please discuss with provider.           ALLERGIES     is allergic to bactrim [sulfamethoxazole-trimethoprim]; biaxin [clarithromycin]; ceclor [cefaclor]; and vancomycin. FAMILY HISTORY     She indicated that her mother is alive. She indicated that her father is alive. She indicated that her maternal grandmother is alive. She indicated that her maternal grandfather is . She indicated that her paternal grandmother is alive. She indicated that her paternal grandfather is . family history includes Diabetes in her mother and paternal grandmother. SOCIAL HISTORY      reports that she has been smoking cigarettes. She has a 10.00 pack-year smoking history. She has never used smokeless tobacco. She reports that she does not drink alcohol or use drugs. PHYSICAL EXAM     INITIAL VITALS:  height is 5' 3\" (1.6 m) and weight is 78.5 kg (173 lb). Her oral temperature is 98.1 °F (36.7 °C). Her blood pressure is 154/95 (abnormal) and her pulse is 78. Her respiration is 18 and oxygen saturation is 97%. Physical Exam  Vitals signs and nursing note reviewed. Constitutional:       Appearance: She is well-developed. HENT:      Head: Normocephalic and atraumatic. Nose: Nose normal.   Eyes:      Extraocular Movements: Extraocular movements intact. Pupils: Pupils are equal, round, and reactive to light. Neck:      Musculoskeletal: Normal range of motion and neck supple. Cardiovascular:      Rate and Rhythm: Normal rate and regular rhythm. Heart sounds: Normal heart sounds. No murmur. Pulmonary:      Effort: Pulmonary effort is normal. No respiratory distress. Breath sounds: Normal breath sounds. Abdominal:      General: Bowel sounds are normal. There is no distension. Palpations: Abdomen is soft. Tenderness: There is no abdominal tenderness. Musculoskeletal:      Comments: Patient has foreign body in the form of fishhook embedded in the left index finger in the middle phalangeal area. Neurovascular examination is intact distally. Skin:     General: Skin is warm and dry.    Neurological:      General: No focal deficit present. Mental Status: She is alert and oriented to person, place, and time. DIFFERENTIAL DIAGNOSIS/ MDM:     Foreign body left index finger  We will remove under ring block. DIAGNOSTIC RESULTS     EKG: All EKG's are interpreted by the Emergency Department Physician who either signs or Co-signs this chart in the absence of a cardiologist.    None obtained    RADIOLOGY:   I directly visualized the following  images and reviewed the radiologist interpretations:  No orders to display       No results found. LABS:  Labs Reviewed - No data to display      EMERGENCY DEPARTMENT COURSE:   Vitals:    Vitals:    06/30/20 2220   BP: (!) 154/95   Pulse: 78   Resp: 18   Temp: 98.1 °F (36.7 °C)   TempSrc: Oral   SpO2: 97%   Weight: 78.5 kg (173 lb)   Height: 5' 3\" (1.6 m)     -------------------------  BP: (!) 154/95, Temp: 98.1 °F (36.7 °C), Pulse: 78, Resp: 18    Orders Placed This Encounter   Medications    lidocaine 1 % injection 5 mL    bacitracin ointment       During the emergency department course, darryn was removed by myself. Please see procedure note. Patient is currently on doxycycline for sinusitis. She is advised to continue the same. Bacitracin ointment and dressing was applied per nursing staff. Wound care instructions were given. Patient was discharged home with instructions to follow-up with PCP, return if worse. I have reviewed the disposition diagnosis with the patient and or their family/guardian. I have answered their questions and given discharge instructions. They voiced understanding of these instructions and did not have any further questions or complaints. Re-evaluation Notes    Patient is feeling much better and resting comfortably. PROCEDURES:  Under ring block of the left index finger with 1% Xylocaine and under strict aseptic precautions the puncture wound on the volar aspect of the left index finger was dilated with a 18-gauge needle.   The needle was inserted deep parallel to the fishhook and the fishhook nadia was caught on the wide bore of the needle and both were pulled out together successfully. Patient tolerated the procedure well. The entrance wound was cleansed and irrigated thoroughly with normal saline, bacitracin ointment and dressing was applied per nursing staff. Wound care instructions were given and patient was discharged home in a stable condition. FINAL IMPRESSION      1. Fish hook injury of finger of left hand, initial encounter          DISPOSITION/PLAN   DISPOSITION Decision To Discharge 06/30/2020 11:19:38 PM      Condition on Disposition    Stable    PATIENT REFERRED TO:  Kizzy Max MD  [address] [place] [address]  [phone]    Call in 2 days  For wound re-check    [facility]  [facility] [place] [address]  [phone]    If symptoms worsen      DISCHARGE MEDICATIONS:  Discharge Medication List as of 6/30/2020 11:20 PM          (Please note that portions of this note were completed with a voice recognition program.  Efforts were made to edit the dictations but occasionally words are mis-transcribed.)    Lane MD, F.A.C.E.P.   Attending Emergency Physician     Trice Miles MD  07/01/20 7699

## 2020-07-31 ENCOUNTER — HOSPITAL ENCOUNTER (OUTPATIENT)
Dept: GENERAL RADIOLOGY | Facility: CLINIC | Age: 50
Discharge: HOME | End: 2020-08-02
Payer: COMMERCIAL

## 2020-07-31 ENCOUNTER — HOSPITAL ENCOUNTER (OUTPATIENT)
Facility: CLINIC | Age: 50
Discharge: HOME | End: 2020-07-31
Payer: COMMERCIAL

## 2020-07-31 ENCOUNTER — HOSPITAL ENCOUNTER (OUTPATIENT)
Facility: CLINIC | Age: 50
Discharge: HOME | End: 2020-08-02
Payer: COMMERCIAL

## 2020-07-31 LAB — ALT SERPL-CCNC: 16 U/L (ref 5–33)

## 2020-07-31 PROCEDURE — 71046 X-RAY EXAM CHEST 2 VIEWS: CPT

## 2020-07-31 PROCEDURE — 36415 COLL VENOUS BLD VENIPUNCTURE: CPT

## 2020-07-31 PROCEDURE — 84460 ALANINE AMINO (ALT) (SGPT): CPT

## 2020-07-31 PROCEDURE — 83036 HEMOGLOBIN GLYCOSYLATED A1C: CPT

## 2020-08-02 LAB
ESTIMATED AVERAGE GLUCOSE: 186 MG/DL
HBA1C MFR BLD: 8.1 % (ref 4–6)

## 2020-09-04 ENCOUNTER — HOSPITAL ENCOUNTER (OUTPATIENT)
Facility: CLINIC | Age: 50
Discharge: HOME | End: 2020-09-04
Payer: COMMERCIAL

## 2020-09-04 ENCOUNTER — HOSPITAL ENCOUNTER (OUTPATIENT)
Dept: ULTRASOUND IMAGING | Facility: CLINIC | Age: 50
Discharge: HOME | End: 2020-09-06
Payer: COMMERCIAL

## 2020-09-04 LAB
ADRENOCORTICOTROPIC HORMONE: 20 PG/ML (ref 6–58)
AFP: 4.2 UG/L
ALBUMIN SERPL-MCNC: 4.2 G/DL (ref 3.5–5.2)
ALBUMIN/GLOBULIN RATIO: 1.7 (ref 1–2.5)
ALP BLD-CCNC: 53 U/L (ref 35–104)
ALT SERPL-CCNC: 19 U/L (ref 5–33)
AST SERPL-CCNC: 18 U/L
BILIRUB SERPL-MCNC: 0.55 MG/DL (ref 0.3–1.2)
BILIRUBIN DIRECT: 0.17 MG/DL
BILIRUBIN, INDIRECT: 0.38 MG/DL (ref 0–1)
CHOLESTEROL, FASTING: 145 MG/DL
CHOLESTEROL/HDL RATIO: 2.4
ESTRADIOL LEVEL: 46 PG/ML (ref 27–314)
FOLLICLE STIMULATING HORMONE: 9.7 U/L (ref 1.7–21.5)
GLOBULIN: NORMAL G/DL (ref 1.5–3.8)
HCT VFR BLD CALC: 42.2 % (ref 36.3–47.1)
HDLC SERPL-MCNC: 61 MG/DL
HEMOGLOBIN: 13.9 G/DL (ref 11.9–15.1)
INR BLD: 1
LDL CHOLESTEROL: 61 MG/DL (ref 0–130)
LH: 5.5 U/L (ref 1–95.6)
MCH RBC QN AUTO: 29.8 PG (ref 25.2–33.5)
MCHC RBC AUTO-ENTMCNC: 32.9 G/DL (ref 28.4–34.8)
MCV RBC AUTO: 90.6 FL (ref 82.6–102.9)
NRBC AUTOMATED: 0 PER 100 WBC
PDW BLD-RTO: 14.2 % (ref 11.8–14.4)
PLATELET # BLD: NORMAL K/UL (ref 138–453)
PLATELET, FLUORESCENCE: 143 K/UL (ref 138–453)
PLATELET, IMMATURE FRACTION: 6.9 % (ref 1.1–10.3)
PMV BLD AUTO: NORMAL FL (ref 8.1–13.5)
PROLACTIN: 11.63 UG/L (ref 4.79–23.3)
PROTHROMBIN TIME: 10.4 SEC (ref 9.4–12.6)
RBC # BLD: 4.66 M/UL (ref 3.95–5.11)
T3 FREE: 3.55 PG/ML (ref 2.02–4.43)
THYROXINE, FREE: 0.99 NG/DL (ref 0.93–1.7)
TOTAL PROTEIN: 6.7 G/DL (ref 6.4–8.3)
TRIGLYCERIDE, FASTING: 116 MG/DL
TSH SERPL DL<=0.05 MIU/L-ACNC: 5.28 MIU/L (ref 0.3–5)
VLDLC SERPL CALC-MCNC: NORMAL MG/DL (ref 1–30)
WBC # BLD: 9.2 K/UL (ref 3.5–11.3)

## 2020-09-04 PROCEDURE — 85610 PROTHROMBIN TIME: CPT

## 2020-09-04 PROCEDURE — 82024 ASSAY OF ACTH: CPT

## 2020-09-04 PROCEDURE — 84443 ASSAY THYROID STIM HORMONE: CPT

## 2020-09-04 PROCEDURE — 76705 ECHO EXAM OF ABDOMEN: CPT

## 2020-09-04 PROCEDURE — 80076 HEPATIC FUNCTION PANEL: CPT

## 2020-09-04 PROCEDURE — 82105 ALPHA-FETOPROTEIN SERUM: CPT

## 2020-09-04 PROCEDURE — 82670 ASSAY OF TOTAL ESTRADIOL: CPT

## 2020-09-04 PROCEDURE — 84439 ASSAY OF FREE THYROXINE: CPT

## 2020-09-04 PROCEDURE — 85055 RETICULATED PLATELET ASSAY: CPT

## 2020-09-04 PROCEDURE — 83002 ASSAY OF GONADOTROPIN (LH): CPT

## 2020-09-04 PROCEDURE — 84146 ASSAY OF PROLACTIN: CPT

## 2020-09-04 PROCEDURE — 80061 LIPID PANEL: CPT

## 2020-09-04 PROCEDURE — 85027 COMPLETE CBC AUTOMATED: CPT

## 2020-09-04 PROCEDURE — 36415 COLL VENOUS BLD VENIPUNCTURE: CPT

## 2020-09-04 PROCEDURE — 84481 FREE ASSAY (FT-3): CPT

## 2020-09-04 PROCEDURE — 83001 ASSAY OF GONADOTROPIN (FSH): CPT

## 2020-09-11 ENCOUNTER — NURSE TRIAGE (OUTPATIENT)
Dept: OTHER | Facility: CLINIC | Age: 50
End: 2020-09-11

## 2020-09-11 NOTE — TELEPHONE ENCOUNTER
Reason for Disposition   Neurologic deficit that was brief (now gone), ANY of the following: * Weakness of the face, arm, or leg on one side of the body * Numbness of the face, arm, or leg on one side of the body * Loss of speech or garbled speech    Answer Assessment - Initial Assessment Questions  1. SYMPTOM: \"What is the main symptom you are concerned about? \" (e.g., weakness, numbness)      Numb leg  2. ONSET: \"When did this start? \" (minutes, hours, days; while sleeping)      today  3. LAST NORMAL: \"When was the last time you were normal (no symptoms)? \"      yesterday  4. PATTERN \"Does this come and go, or has it been constant since it started? \"  \"Is it present now? \"      constant  5. CARDIAC SYMPTOMS: \"Have you had any of the following symptoms: chest pain, difficulty breathing, palpitations? \"      Heart meds for rhythm  6. NEUROLOGIC SYMPTOMS: \"Have you had any of the following symptoms: headache, dizziness, vision loss, double vision, changes in speech, unsteady on your feet? \"      no  7. OTHER SYMPTOMS: \"Do you have any other symptoms? \"      no  8. PREGNANCY: \"Is there any chance you are pregnant? \" \"When was your last menstrual period? \"      no    Protocols used: NEUROLOGIC DEFICIT-ADULT-OH    Patient called pre-service center [facility] with red flag complaint. Brief description of triage: numbness in leg. No other symptoms. Believes to have slept on it wrong. Declines UC or ED care. Pt would like to schedule next available appt with PCP. Triage indicates for patient to be seen today in office    Care advice provided, patient verbalizes understanding; denies any other questions or concerns; instructed to call back for any new or worsening symptoms. Writer provided warm transfer to [facility] for appointment scheduling. Please do not respond to the triage nurse through this encounter. Any subsequent communication should be directly with the patient.

## 2020-09-14 ENCOUNTER — HOSPITAL ENCOUNTER (OUTPATIENT)
Facility: CLINIC | Age: 50
Discharge: HOME | End: 2020-09-14
Payer: COMMERCIAL

## 2020-09-14 LAB
ALBUMIN SERPL-MCNC: 4.5 G/DL (ref 3.5–5.2)
ALBUMIN/GLOBULIN RATIO: 1.6 (ref 1–2.5)
ALP BLD-CCNC: 62 U/L (ref 35–104)
ALT SERPL-CCNC: 21 U/L (ref 5–33)
ANION GAP SERPL CALCULATED.3IONS-SCNC: 10 MMOL/L (ref 9–17)
AST SERPL-CCNC: 15 U/L
BILIRUB SERPL-MCNC: 0.58 MG/DL (ref 0.3–1.2)
BUN BLDV-MCNC: 8 MG/DL (ref 6–20)
BUN/CREAT BLD: ABNORMAL (ref 9–20)
CALCIUM SERPL-MCNC: 9.5 MG/DL (ref 8.6–10.4)
CHLORIDE BLD-SCNC: 100 MMOL/L (ref 98–107)
CO2: 25 MMOL/L (ref 20–31)
CREAT SERPL-MCNC: 0.81 MG/DL (ref 0.5–0.9)
GFR AFRICAN AMERICAN: >60 ML/MIN
GFR NON-AFRICAN AMERICAN: >60 ML/MIN
GFR SERPL CREATININE-BSD FRML MDRD: ABNORMAL ML/MIN/{1.73_M2}
GFR SERPL CREATININE-BSD FRML MDRD: ABNORMAL ML/MIN/{1.73_M2}
GLUCOSE BLD-MCNC: 293 MG/DL (ref 70–99)
POTASSIUM SERPL-SCNC: 4 MMOL/L (ref 3.7–5.3)
SODIUM BLD-SCNC: 135 MMOL/L (ref 135–144)
TOTAL PROTEIN: 7.3 G/DL (ref 6.4–8.3)

## 2020-09-14 PROCEDURE — 36415 COLL VENOUS BLD VENIPUNCTURE: CPT

## 2020-09-14 PROCEDURE — 80053 COMPREHEN METABOLIC PANEL: CPT

## 2020-09-14 PROCEDURE — 83036 HEMOGLOBIN GLYCOSYLATED A1C: CPT

## 2020-09-15 LAB
ESTIMATED AVERAGE GLUCOSE: 200 MG/DL
HBA1C MFR BLD: 8.6 % (ref 4–6)

## 2020-11-05 ENCOUNTER — OFFICE VISIT (OUTPATIENT)
Dept: FAMILY MEDICINE CLINIC | Age: 50
End: 2020-11-05
Payer: COMMERCIAL

## 2020-11-05 VITALS
DIASTOLIC BLOOD PRESSURE: 84 MMHG | OXYGEN SATURATION: 97 % | HEIGHT: 63 IN | HEART RATE: 78 BPM | SYSTOLIC BLOOD PRESSURE: 129 MMHG | WEIGHT: 172.4 LBS | BODY MASS INDEX: 30.55 KG/M2 | TEMPERATURE: 97 F

## 2020-11-05 PROCEDURE — 90686 IIV4 VACC NO PRSV 0.5 ML IM: CPT | Performed by: FAMILY MEDICINE

## 2020-11-05 PROCEDURE — 99213 OFFICE O/P EST LOW 20 MIN: CPT | Performed by: FAMILY MEDICINE

## 2020-11-05 PROCEDURE — 90471 IMMUNIZATION ADMIN: CPT | Performed by: FAMILY MEDICINE

## 2020-11-05 PROCEDURE — 90472 IMMUNIZATION ADMIN EACH ADD: CPT | Performed by: FAMILY MEDICINE

## 2020-11-05 PROCEDURE — 90732 PPSV23 VACC 2 YRS+ SUBQ/IM: CPT | Performed by: FAMILY MEDICINE

## 2020-11-05 RX ORDER — SITAGLIPTIN 100 MG/1
TABLET, FILM COATED ORAL
COMMUNITY
Start: 2020-10-19

## 2020-11-05 RX ORDER — ROSUVASTATIN CALCIUM 10 MG/1
TABLET, COATED ORAL
COMMUNITY
Start: 2020-10-21

## 2020-11-05 ASSESSMENT — PATIENT HEALTH QUESTIONNAIRE - PHQ9
2. FEELING DOWN, DEPRESSED OR HOPELESS: 0
SUM OF ALL RESPONSES TO PHQ QUESTIONS 1-9: 0
1. LITTLE INTEREST OR PLEASURE IN DOING THINGS: 0
SUM OF ALL RESPONSES TO PHQ QUESTIONS 1-9: 0
SUM OF ALL RESPONSES TO PHQ9 QUESTIONS 1 & 2: 0
SUM OF ALL RESPONSES TO PHQ QUESTIONS 1-9: 0

## 2020-11-05 NOTE — PROGRESS NOTES
General FM note    Abiel Melgar is a 53 y.o. female who presents today for follow up on her  medical conditions as noted below. Abiel Melgar is c/o of   Chief Complaint   Patient presents with    Hand Problem       Patient Active Problem List:     Fatigue     GERD (gastroesophageal reflux disease)     Obesity (BMI 30-39. 9)     Diabetes mellitus (HCC)     Snoring     Elevated liver enzymes     Unstable angina (HCC)     Positive cardiac stress test     Anomalous coronary artery origin     Trigger thumb of right hand     Past Medical History:   Diagnosis Date    Anomalous coronary artery origin 2016    Anxiety     Asthma     Back pain     DDD (degenerative disc disease), cervical     DDD (degenerative disc disease), lumbar     Diabetes mellitus ([facility] [address]     Fatty liver     Mitral prolapse     Pneumonia     Thoracic outlet syndrome     Thyroid goiter     had biopsy      Past Surgical History:   Procedure Laterality Date    BRONCHOSCOPY      CARDIAC SURGERY      x 1    CERVICAL SPINE SURGERY      plates placed     SECTION      x 2    COLONOSCOPY      ENDOSCOPY, COLON, DIAGNOSTIC      EGD    FINGER TRIGGER RELEASE Right 10/9/2019    THUMB TRIGGER RELEASE performed by Nima Bailey MD at [address]      abdomen    NE SONO GUIDE NEEDLE BIOPSY      TONSILLECTOMY      TUBAL LIGATION       Family History   Problem Relation Age of Onset    Diabetes Mother     Diabetes Paternal Grandmother      Current Outpatient Medications   Medication Sig Dispense Refill    JANUVIA 100 MG tablet TAKE 1 TABLET BY MOUTH EVERY DAY      fluticasone (FLONASE ALLERGY RELIEF) 50 MCG/ACT nasal spray 1 spray in each nostril      mometasone-formoterol (DULERA) 100-5 MCG/ACT inhaler Inhale 2 puffs into the lungs 2 times daily Rinse mouth after using.  1 Inhaler 2    hydrocortisone 2.5 % cream Apply once daily to corners of mouth 60 g 5    pantoprazole sodium (PROTONIX) 40 MG PACK packet Take 40 mg by mouth daily      metoprolol succinate (TOPROL XL) 25 MG extended release tablet Take 2 tablets by mouth daily (Patient taking differently: Take 50 mg by mouth daily 50 mg in the morning, 25 mg at night) 60 tablet 3    ranolazine (RANEXA) 1000 MG extended release tablet Take 1,000 mg by mouth      rosuvastatin (CRESTOR) 10 MG tablet TAKE 1 TABLET BY MOUTH EVERY DAY      doxycycline hyclate (VIBRA-TABS) 100 MG tablet doxycycline hyclate 100 mg tablet   TAKE 1 TABLET BY MOUTH TWICE A DAY FOR 10 DAYS      gabapentin (NEURONTIN) 100 MG capsule Take 2 capsules by mouth 2 times daily for 28 days.  Take 150 mg BID for 4 weeks 112 capsule 0    ibuprofen (ADVIL;MOTRIN) 800 MG tablet TAKE 1 TABLET BY MOUTH EVERY 8 HOURS AS NEEDED FOR PAIN (Patient not taking: Reported on 11/5/2020) 30 tablet 1    ondansetron (ZOFRAN) 4 MG tablet Take 1 tablet by mouth daily as needed for Nausea or Vomiting (Patient not taking: Reported on 11/5/2020) 20 tablet 0    vitamin E 400 UNIT capsule Take 400 Units by mouth daily      senna-docusate (PERICOLACE) 8.6-50 MG per tablet TAKE TWO TABLETS BY MOUTH DAILY AS NEEDED FOR CONSTIPATION (Patient not taking: Reported on 11/5/2020) 30 tablet 0    ketoconazole (NIZORAL) 2 % cream Apply once daily to corners of mouth 30 g 2    albuterol sulfate HFA (VENTOLIN HFA) 108 (90 Base) MCG/ACT inhaler Inhale 2 puffs into the lungs every 6 hours as needed for Wheezing 1 Inhaler 3    nystatin (MYCOSTATIN) 474916 UNIT/ML suspension Take 5 mLs by mouth 4 times daily Swish and spit (Patient not taking: Reported on 11/5/2020) 120 mL 1    ibuprofen (ADVIL;MOTRIN) 800 MG tablet Take 1 tablet by mouth every 8 hours as needed for Pain 90 tablet 6    Magic Mouthwash (MIRACLE MOUTHWASH) Take 15 mLs by mouth as needed       medroxyPROGESTERone (PROVERA) 10 MG tablet Take 10 mg by mouth as needed       SITagliptin (JANUVIA) 25 MG tablet Take 100 mg by mouth daily       insulin glargine (TOUJEO SOLOSTAR) 300 UNIT/ML injection pen Inject 23 Units into the skin daily       nitroGLYCERIN (NITROSTAT) 0.4 MG SL tablet Place 0.4 mg under the tongue every 5 minutes as needed for Chest pain Dissolve 1 tab under tongue at first sign of chest pain. May repeat every 5 minutes until relief is obtained. If pain persists after taking 3 tabs in a 15-minute period, or the pain is different than is typically experienced, call 9-1-1 immediately.  clonazePAM (KLONOPIN) 0.5 MG tablet Take 1 tablet by mouth 2 times daily as needed for Anxiety (Patient not taking: Reported on 11/5/2020) 20 tablet 0    TRUETEST TEST strip   0    TRUEPLUS LANCETS 33G MISC   0    albuterol (PROVENTIL) (2.5 MG/3ML) 0.083% nebulizer solution Take 3 mLs by nebulization every 6 hours as needed for Wheezing 120 each 3    albuterol (PROVENTIL HFA;VENTOLIN HFA) 108 (90 BASE) MCG/ACT inhaler Inhale 2 puffs into the lungs every 6 hours as needed. 1 Inhaler 3     No current facility-administered medications for this visit. Facility-Administered Medications Ordered in Other Visits   Medication Dose Route Frequency Provider Last Rate Last Dose    nitroGLYCERIN (NITROSTAT) SL tablet 0.4 mg  0.4 mg Sublingual Q5 Min PRN Aman Gracia MD   0.4 mg at 09/16/16 0857    0.9 % sodium chloride infusion   Intravenous Continuous Aman Gracia MD 20 mL/hr at 09/16/16 1243       ALLERGIES:    Allergies   Allergen Reactions    Bactrim [Sulfamethoxazole-Trimethoprim]     Biaxin [Clarithromycin]     Ceclor [Cefaclor]     Vancomycin      Itching, burning       Social History     Tobacco Use    Smoking status: Current Every Day Smoker     Packs/day: 0.50     Years: 20.00     Pack years: 10.00     Types: Cigarettes    Smokeless tobacco: Never Used   Substance Use Topics    Alcohol use: No     Alcohol/week: 0.0 standard drinks      Body mass index is 30.54 kg/m².   /84   Pulse 78   Temp 97 °F (36.1 °C)   Ht 5' 3\" (1.6 m)   Wt 172 lb 6.4 oz

## 2020-11-10 ENCOUNTER — HOSPITAL ENCOUNTER (OUTPATIENT)
Dept: CT IMAGING | Facility: CLINIC | Age: 50
Discharge: HOME | End: 2020-11-12
Payer: COMMERCIAL

## 2020-11-10 ENCOUNTER — HOSPITAL ENCOUNTER (OUTPATIENT)
Facility: CLINIC | Age: 50
Discharge: HOME | End: 2020-11-10
Payer: COMMERCIAL

## 2020-11-10 LAB
AFP: 4.5 UG/L
ALBUMIN SERPL-MCNC: 4.5 G/DL (ref 3.5–5.2)
ALBUMIN/GLOBULIN RATIO: 1.8 (ref 1–2.5)
ALP BLD-CCNC: 64 U/L (ref 35–104)
ALT SERPL-CCNC: 17 U/L (ref 5–33)
AST SERPL-CCNC: 18 U/L
BILIRUB SERPL-MCNC: 0.57 MG/DL (ref 0.3–1.2)
BILIRUBIN DIRECT: 0.16 MG/DL
BILIRUBIN, INDIRECT: 0.41 MG/DL (ref 0–1)
C-REACTIVE PROTEIN: 2 MG/L (ref 0–5)
GLOBULIN: NORMAL G/DL (ref 1.5–3.8)
HCT VFR BLD CALC: 42.7 % (ref 36.3–47.1)
HEMOGLOBIN: 13.9 G/DL (ref 11.9–15.1)
INR BLD: 1
MCH RBC QN AUTO: 29 PG (ref 25.2–33.5)
MCHC RBC AUTO-ENTMCNC: 32.6 G/DL (ref 28.4–34.8)
MCV RBC AUTO: 89.1 FL (ref 82.6–102.9)
NRBC AUTOMATED: 0 PER 100 WBC
PDW BLD-RTO: 13 % (ref 11.8–14.4)
PLATELET # BLD: 131 K/UL (ref 138–453)
PMV BLD AUTO: 12.1 FL (ref 8.1–13.5)
PROTHROMBIN TIME: 10.1 SEC (ref 9.4–12.6)
RBC # BLD: 4.79 M/UL (ref 3.95–5.11)
TOTAL PROTEIN: 7 G/DL (ref 6.4–8.3)
WBC # BLD: 7.8 K/UL (ref 3.5–11.3)

## 2020-11-10 PROCEDURE — 80076 HEPATIC FUNCTION PANEL: CPT

## 2020-11-10 PROCEDURE — 85027 COMPLETE CBC AUTOMATED: CPT

## 2020-11-10 PROCEDURE — 74150 CT ABDOMEN W/O CONTRAST: CPT

## 2020-11-10 PROCEDURE — 82105 ALPHA-FETOPROTEIN SERUM: CPT

## 2020-11-10 PROCEDURE — 36415 COLL VENOUS BLD VENIPUNCTURE: CPT

## 2020-11-10 PROCEDURE — 86140 C-REACTIVE PROTEIN: CPT

## 2020-11-10 PROCEDURE — 85610 PROTHROMBIN TIME: CPT

## 2020-11-11 ENCOUNTER — TELEPHONE (OUTPATIENT)
Dept: FAMILY MEDICINE CLINIC | Age: 50
End: 2020-11-11

## 2020-11-12 ENCOUNTER — TELEPHONE (OUTPATIENT)
Dept: FAMILY MEDICINE CLINIC | Age: 50
End: 2020-11-12

## 2020-11-13 ENCOUNTER — HOSPITAL ENCOUNTER (OUTPATIENT)
Facility: MEDICAL CENTER | Age: 50
End: 2020-11-13
Payer: COMMERCIAL

## 2020-11-13 ENCOUNTER — HOSPITAL ENCOUNTER (OUTPATIENT)
Facility: CLINIC | Age: 50
Discharge: HOME | End: 2020-11-13
Payer: COMMERCIAL

## 2020-11-13 ENCOUNTER — TELEPHONE (OUTPATIENT)
Dept: ONCOLOGY | Age: 50
End: 2020-11-13

## 2020-11-13 LAB
GGT: 22 U/L (ref 5–36)
LACTATE DEHYDROGENASE: 234 U/L (ref 135–214)

## 2020-11-13 PROCEDURE — 86665 EPSTEIN-BARR CAPSID VCA: CPT

## 2020-11-13 PROCEDURE — 83615 LACTATE (LD) (LDH) ENZYME: CPT

## 2020-11-13 PROCEDURE — 82977 ASSAY OF GGT: CPT

## 2020-11-13 PROCEDURE — 86663 EPSTEIN-BARR ANTIBODY: CPT

## 2020-11-13 PROCEDURE — 86664 EPSTEIN-BARR NUCLEAR ANTIGEN: CPT

## 2020-11-13 PROCEDURE — 36415 COLL VENOUS BLD VENIPUNCTURE: CPT

## 2020-11-13 NOTE — TELEPHONE ENCOUNTER
PATIENT ARRIVES FOR CONSULTATION APPOINTMENT. PATIENT WAS HAVING TEMP TAKEN DOWNSTAIRS WHEN SHE STATED SHE WAS EXPOSED TO COVID-19. DR Jolly Vaughan WENT DOWN TO SPEAK TO PAULY, INFORMED HER HE WAS ORDERING LAB WORK AND HE WOULD LIKE TO RESCHEDULE CONSULTATION APPOINTMENT FOR EARLY December.  LABS GAMMA GT LD CMV BY PCR QUANTITATIVE MUKESH-BARR VIRUS VCA ANTIBODY PANEL TO BE DONE TODAY AT THE LAB ON [address], MD VISIT 12/4/20 @9:15AM  PATIENT IS AWARE OF APPOINTMENT AND LABS

## 2020-11-16 ENCOUNTER — TELEPHONE (OUTPATIENT)
Dept: FAMILY MEDICINE CLINIC | Age: 50
End: 2020-11-16

## 2020-11-16 LAB
EBV EARLY ANTIGEN IGG: 65 U/ML
EBV INTERPRETATION: ABNORMAL
EBV NUCLEAR AG AB: 450 U/ML
EPSTEIN-BARR VCA IGG: 1504 U/ML
EPSTEIN-BARR VCA IGM: 59 U/ML

## 2020-11-16 NOTE — TELEPHONE ENCOUNTER
Pt was exposed to [employer] by her mom who tested positive last week. Pt  c/o Headache, diarrhea.  Wants order faxed to [facility] on [address].

## 2020-11-20 LAB
CMV DNA QUANTATATIVE INTERPRETATION: NOT DETECTED
CMV QUANT IU/ML: <227 IU/ML
CMV QUANT LOG IU/ML: <2.4 LOG IU/ML
CMV SOURCE: NORMAL
CMVQ COPY/ML: <390 CPY/ML
CYTOMEGALOVIRUS QUANT. PCR: <2.6 LOG CPY/ML

## 2020-11-25 ENCOUNTER — HOSPITAL ENCOUNTER (OUTPATIENT)
Facility: MEDICAL CENTER | Age: 50
End: 2020-11-25
Payer: COMMERCIAL

## 2020-12-04 ENCOUNTER — TELEPHONE (OUTPATIENT)
Dept: ONCOLOGY | Age: 50
End: 2020-12-04

## 2020-12-04 ENCOUNTER — INITIAL CONSULT (OUTPATIENT)
Dept: ONCOLOGY | Age: 50
End: 2020-12-04
Payer: COMMERCIAL

## 2020-12-04 VITALS
BODY MASS INDEX: 29.91 KG/M2 | SYSTOLIC BLOOD PRESSURE: 140 MMHG | DIASTOLIC BLOOD PRESSURE: 86 MMHG | RESPIRATION RATE: 16 BRPM | TEMPERATURE: 98.2 F | HEART RATE: 82 BPM | HEIGHT: 63 IN | WEIGHT: 168.8 LBS

## 2020-12-04 PROCEDURE — 99212 OFFICE O/P EST SF 10 MIN: CPT

## 2020-12-04 PROCEDURE — 99244 OFF/OP CNSLTJ NEW/EST MOD 40: CPT | Performed by: INTERNAL MEDICINE

## 2020-12-04 NOTE — LETTER
Ede Jaffe MD    2020     Tristan Baig MD   [address] [facility] [id number]    Dear Gill Lin : Thank you for referring Kayce Jiménez, 1970, to me for evaluation. Below are the relevant portions of my assessment and plan of care. DIAGNOSIS:   1. Splenomegaly with live cirrhosis   2. MOLINA    CURRENT THERAPY:  Plan for weight loss and repeat imaging     BRIEF CASE HISTORY:   Kayce Jiménez is a very pleasant 53 y.o. female who is referred to us for splenomegaly with liver cirrhosis. She recently has COVID testing and was negative. She had CT 2020 that showed splenomegaly and some liver changes. She was diagnosed with diabetes 5 years ago and was started on insulin and has since stopped. She has history of fungal infection in the lungs and was treated with steroids. She was found to have mass on adrenal gland that has been monitored, during the initial evaluation and notes she was informed of cirrhosis of the liver. Her imaging history shows slow enlargement of the spleen. She has consulted with GI locally and with [facility], it was felt to possibly be metabolic disorder. She has no history of significant alcohol consumption. She has been working to lose weight and has lost about 5 pounds, difficult with insulin resistance. PAST MEDICAL HISTORY: has a past medical history of Anomalous coronary artery origin, Anxiety, Asthma, Back pain, DDD (degenerative disc disease), cervical, DDD (degenerative disc disease), lumbar, Diabetes mellitus ([address] Fatty liver, Mitral prolapse, Pneumonia, Thoracic outlet syndrome, and Thyroid goiter. PAST SURGICAL HISTORY: has a past surgical history that includes Tubal ligation;  section; Cervical spine surgery; hernia repair; Colonoscopy; Endoscopy, colon, diagnostic; bronchoscopy; Cardiac surgery; Tonsillectomy; pr sono guide needle biopsy; and Finger trigger release (Right, 10/9/2019). CURRENT MEDICATIONS:  has a current medication list which includes the following prescription(s): januvia, rosuvastatin, fluticasone, senna-docusate, hydrocortisone, ketoconazole, albuterol sulfate hfa, ibuprofen, magic mouthwash, medroxyprogesterone, sitagliptin, pantoprazole sodium, metoprolol succinate, ranolazine, nitroglycerin, clonazepam, truetest test, trueplus lancets 33g, albuterol, albuterol sulfate hfa, doxycycline hyclate, gabapentin, mometasone-formoterol, ibuprofen, ondansetron, vitamin e, nystatin, and insulin glargine, and the following Facility-Administered Medications: nitroglycerin and sodium chloride. ALLERGIES:  is allergic to bactrim [sulfamethoxazole-trimethoprim]; biaxin [clarithromycin]; ceclor [cefaclor]; and vancomycin. FAMILY HISTORY: Negative for any hematological or oncological conditions. SOCIAL HISTORY:  reports that she has been smoking cigarettes. She has a 10.00 pack-year smoking history. She has never used smokeless tobacco. She reports that she does not drink alcohol or use drugs. REVIEW OF SYSTEMS:   General: No fever or night sweats. Weight is stable. ENT: No double or blurred vision, no tinnitus or hearing problem, no dysphagia or sore throat   Respiratory: No chest pain, no shortness of breath, no cough or hemoptysis. Cardiovascular: Denies chest pain, PND or orthopnea. No L E swelling or palpitations. Gastrointestinal: No nausea or vomiting, abdominal pain, diarrhea or constipation. Genitourinary: Denies dysuria, hematuria, frequency, urgency or incontinence. Neurological: Denies headaches, decreased LOC, no sensory or motor focal deficits. Musculoskeletal: No arthralgia no back pain or joint swelling. Skin: There are no rashes or bleeding. Psychiatric: No anxiety, no depression. Endocrine: No diabetes or thyroid disease. Hematologic: No bleeding, no adenopathy.     PHYSICAL EXAM: Shows a well appearing 53y.o.-year-old female who is not in pain or distress. Vital Signs: Blood pressure (!) 140/86, pulse 82, temperature 98.2 °F (36.8 °C), temperature source Oral, resp. rate 16, height 5' 3\" (1.6 m), weight 168 lb 12.8 oz (76.6 kg), not currently breastfeeding. HEENT: Normocephalic and atraumatic. Pupils are equal, round, reactive to light and accommodation. Extraocular muscles are intact. Neck: Showed no JVD, no carotid bruit . Lungs: Clear to auscultation bilaterally. Heart: Regular without any murmur. Abdomen: Soft, nontender. No hepatosplenomegaly. Extremities: Lower extremities show no edema, clubbing, or cyanosis. Breasts: Examination not done today. Neuro exam: intact cranial nerves bilaterally no motor or sensory deficit, gait is normal. Lymphatic: no adenopathy appreciated in the supraclavicular, axillary, cervical or inguinal area    REVIEW OF LABORATORY DATA:   Lab Results   Component Value Date    WBC 7.8 11/10/2020    HGB 13.9 11/10/2020    HCT 42.7 11/10/2020    MCV 89.1 11/10/2020     (L) 11/10/2020       Chemistry        Component Value Date/Time     09/14/2020 1623    K 4.0 09/14/2020 1623     09/14/2020 1623    CO2 25 09/14/2020 1623    BUN 8 09/14/2020 1623    CREATININE 0.81 09/14/2020 1623        Component Value Date/Time    CALCIUM 9.5 09/14/2020 1623    ALKPHOS 64 11/10/2020 1006    AST 18 11/10/2020 1006    ALT 17 11/10/2020 1006    BILITOT 0.57 11/10/2020 1006            REVIEW OF RADIOLOGICAL RESULTS:   CT 11/10/2020  Impression    *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  No acute process is seen. *Cirrhotic liver with splenomegaly. IMPRESSION:   1. Splenomegaly with liver cirrhosis  2. MOLINA     PLAN:   1. We had detailed review of her medical history. 2. I reviewed her current imaging in compared to previous showing slow progression of splenomegaly. 3. We discussed in detail her condition and I explained weight loss will reverse the condition and strategy was discussed. 4. I discussed plan for repeat imaging in 6 months following some weight loss, with goal of 15 to 20 pounds. 5. Return in 6 months. If you have questions, please do not hesitate to call me. I look forward to following Lenora along with you.     Sincerely,    Anil Miramontes MD  Hematology/ Oncology  Cell [phone]

## 2020-12-04 NOTE — TELEPHONE ENCOUNTER
PAUL[facility] ARRIVES AMBULATORY FOR CONSULTATION APPOINTMENT  DR David Kwan IN TO SEE PATIENT  ORDERS RECEIVED  RV 6 MONTHS W/LABS & CT SCAN PRIOR  LABS CDP CMP AFP TUMOR MARKER 6/1/21 TO BE DONE W/CT AT [place]  CT ABD/PELVIS W/IV CONTRAST 6/1/21 @11AM  ARRIVE BY 10AM  [place] LOCATION  MD VISIT 6/4/21 @11AM  AVS PRINTED AND GIVEN TO PATIENT WITH INSTRUCTIONS  PATIENT DISCHARGED AMBULATORY

## 2020-12-04 NOTE — PROGRESS NOTES
DIAGNOSIS:   1. Splenomegaly with live cirrhosis   2. MOLINA    CURRENT THERAPY:  Plan for weight loss and repeat imaging     BRIEF CASE HISTORY:   Karen Rice is a very pleasant 53 y.o. female who is referred to us for splenomegaly with liver cirrhosis. She recently has COVID testing and was negative. She had CT 2020 that showed splenomegaly and some liver changes. She was diagnosed with diabetes 5 years ago and was started on insulin and has since stopped. She has history of fungal infection in the lungs and was treated with steroids. She was found to have mass on adrenal gland that has been monitored, during the initial evaluation and notes she was informed of cirrhosis of the liver. Her imaging history shows slow enlargement of the spleen. She has consulted with GI locally and with [facility], it was felt to possibly be metabolic disorder. She has no history of significant alcohol consumption. She has been working to lose weight and has lost about 5 pounds, difficult with insulin resistance. PAST MEDICAL HISTORY: has a past medical history of Anomalous coronary artery origin, Anxiety, Asthma, Back pain, DDD (degenerative disc disease), cervical, DDD (degenerative disc disease), lumbar, Diabetes mellitus ([address] Fatty liver, Mitral prolapse, Pneumonia, Thoracic outlet syndrome, and Thyroid goiter. PAST SURGICAL HISTORY: has a past surgical history that includes Tubal ligation;  section; Cervical spine surgery; hernia repair; Colonoscopy; Endoscopy, colon, diagnostic; bronchoscopy; Cardiac surgery; Tonsillectomy; pr sono guide needle biopsy; and Finger trigger release (Right, 10/9/2019).      CURRENT MEDICATIONS:  has a current medication list which includes the following prescription(s): januvia, rosuvastatin, fluticasone, senna-docusate, hydrocortisone, ketoconazole, albuterol sulfate hfa, ibuprofen, magic mouthwash, medroxyprogesterone, sitagliptin, pantoprazole sodium, metoprolol succinate, ranolazine, nitroglycerin, clonazepam, truetest test, trueplus lancets 33g, albuterol, albuterol sulfate hfa, doxycycline hyclate, gabapentin, mometasone-formoterol, ibuprofen, ondansetron, vitamin e, nystatin, and insulin glargine, and the following Facility-Administered Medications: nitroglycerin and sodium chloride. ALLERGIES:  is allergic to bactrim [sulfamethoxazole-trimethoprim]; biaxin [clarithromycin]; ceclor [cefaclor]; and vancomycin. FAMILY HISTORY: Negative for any hematological or oncological conditions. SOCIAL HISTORY:  reports that she has been smoking cigarettes. She has a 10.00 pack-year smoking history. She has never used smokeless tobacco. She reports that she does not drink alcohol or use drugs. REVIEW OF SYSTEMS:   General: No fever or night sweats. Weight is stable. ENT: No double or blurred vision, no tinnitus or hearing problem, no dysphagia or sore throat   Respiratory: No chest pain, no shortness of breath, no cough or hemoptysis. Cardiovascular: Denies chest pain, PND or orthopnea. No L E swelling or palpitations. Gastrointestinal: No nausea or vomiting, abdominal pain, diarrhea or constipation. Genitourinary: Denies dysuria, hematuria, frequency, urgency or incontinence. Neurological: Denies headaches, decreased LOC, no sensory or motor focal deficits. Musculoskeletal: No arthralgia no back pain or joint swelling. Skin: There are no rashes or bleeding. Psychiatric: No anxiety, no depression. Endocrine: No diabetes or thyroid disease. Hematologic: No bleeding, no adenopathy. PHYSICAL EXAM: Shows a well appearing 53y.o.-year-old female who is not in pain or distress. Vital Signs: Blood pressure (!) 140/86, pulse 82, temperature 98.2 °F (36.8 °C), temperature source Oral, resp. rate 16, height 5' 3\" (1.6 m), weight 168 lb 12.8 oz (76.6 kg), not currently breastfeeding. HEENT: Normocephalic and atraumatic.  Pupils are equal, round, reactive to light and accommodation. Extraocular muscles are intact. Neck: Showed no JVD, no carotid bruit . Lungs: Clear to auscultation bilaterally. Heart: Regular without any murmur. Abdomen: Soft, nontender. No hepatosplenomegaly. Extremities: Lower extremities show no edema, clubbing, or cyanosis. Breasts: Examination not done today. Neuro exam: intact cranial nerves bilaterally no motor or sensory deficit, gait is normal. Lymphatic: no adenopathy appreciated in the supraclavicular, axillary, cervical or inguinal area    REVIEW OF LABORATORY DATA:   Lab Results   Component Value Date    WBC 7.8 11/10/2020    HGB 13.9 11/10/2020    HCT 42.7 11/10/2020    MCV 89.1 11/10/2020     (L) 11/10/2020       Chemistry        Component Value Date/Time     09/14/2020 1623    K 4.0 09/14/2020 1623     09/14/2020 1623    CO2 25 09/14/2020 1623    BUN 8 09/14/2020 1623    CREATININE 0.81 09/14/2020 1623        Component Value Date/Time    CALCIUM 9.5 09/14/2020 1623    ALKPHOS 64 11/10/2020 1006    AST 18 11/10/2020 1006    ALT 17 11/10/2020 1006    BILITOT 0.57 11/10/2020 1006            REVIEW OF RADIOLOGICAL RESULTS:   CT 11/10/2020  Impression    *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  No acute process is seen. *Cirrhotic liver with splenomegaly. IMPRESSION:   1. Splenomegaly with liver cirrhosis  2. MOLINA     PLAN:   1. We had detailed review of her medical history. 2. I reviewed her current imaging in compared to previous showing slow progression of splenomegaly. 3. We discussed in detail her condition and I explained weight loss will reverse the condition and strategy was discussed. 4. I discussed plan for repeat imaging in 6 months following some weight loss, with goal of 15 to 20 pounds. 5. Return in 6 months.

## 2021-01-14 ENCOUNTER — TELEPHONE (OUTPATIENT)
Dept: FAMILY MEDICINE CLINIC | Age: 51
End: 2021-01-14

## 2021-01-22 ENCOUNTER — HOSPITAL ENCOUNTER (OUTPATIENT)
Age: 51
Setting detail: SPECIMEN
Discharge: HOME | End: 2021-01-22
Payer: COMMERCIAL

## 2021-01-22 ENCOUNTER — OFFICE VISIT (OUTPATIENT)
Dept: FAMILY MEDICINE CLINIC | Age: 51
End: 2021-01-22
Payer: COMMERCIAL

## 2021-01-22 ENCOUNTER — TELEPHONE (OUTPATIENT)
Dept: FAMILY MEDICINE CLINIC | Age: 51
End: 2021-01-22

## 2021-01-22 VITALS
BODY MASS INDEX: 28.7 KG/M2 | SYSTOLIC BLOOD PRESSURE: 131 MMHG | TEMPERATURE: 97 F | OXYGEN SATURATION: 98 % | DIASTOLIC BLOOD PRESSURE: 84 MMHG | HEART RATE: 72 BPM | WEIGHT: 162 LBS

## 2021-01-22 DIAGNOSIS — M54.50 CHRONIC BILATERAL LOW BACK PAIN WITHOUT SCIATICA: Primary | ICD-10-CM

## 2021-01-22 DIAGNOSIS — L60.8 CHANGE IN NAIL APPEARANCE: ICD-10-CM

## 2021-01-22 DIAGNOSIS — G89.29 CHRONIC BILATERAL LOW BACK PAIN WITHOUT SCIATICA: Primary | ICD-10-CM

## 2021-01-22 DIAGNOSIS — R23.9 SKIN CHANGE: ICD-10-CM

## 2021-01-22 DIAGNOSIS — M54.2 CHRONIC CERVICAL PAIN: ICD-10-CM

## 2021-01-22 DIAGNOSIS — E11.9 TYPE 2 DIABETES MELLITUS WITHOUT COMPLICATION, WITHOUT LONG-TERM CURRENT USE OF INSULIN (HCC): ICD-10-CM

## 2021-01-22 DIAGNOSIS — J30.89 NON-SEASONAL ALLERGIC RHINITIS, UNSPECIFIED TRIGGER: ICD-10-CM

## 2021-01-22 DIAGNOSIS — G89.29 CHRONIC CERVICAL PAIN: ICD-10-CM

## 2021-01-22 LAB
CREATININE URINE: 271.3 MG/DL (ref 28–217)
HBA1C MFR BLD: 10.1 %
MICROALBUMIN/CREAT 24H UR: 64 MG/L
MICROALBUMIN/CREAT UR-RTO: 24 MCG/MG CREAT

## 2021-01-22 PROCEDURE — 83036 HEMOGLOBIN GLYCOSYLATED A1C: CPT | Performed by: FAMILY MEDICINE

## 2021-01-22 PROCEDURE — 99214 OFFICE O/P EST MOD 30 MIN: CPT | Performed by: FAMILY MEDICINE

## 2021-01-22 ASSESSMENT — PATIENT HEALTH QUESTIONNAIRE - PHQ9
2. FEELING DOWN, DEPRESSED OR HOPELESS: 0
SUM OF ALL RESPONSES TO PHQ QUESTIONS 1-9: 0
SUM OF ALL RESPONSES TO PHQ9 QUESTIONS 1 & 2: 0

## 2021-01-22 NOTE — PATIENT INSTRUCTIONS
trimester. You should not breastfeed while using this medicine. How should I take lisinopril? Follow all directions on your prescription label and read all medication guides or instruction sheets. Your doctor may occasionally change your dose. Use the medicine exactly as directed. Drink plenty of water each day while you are taking this medicine. Lisinopril can be taken with or without food. Measure liquid medicine carefully. Use the dosing syringe provided, or use a medicine dose-measuring device (not a kitchen spoon). Your blood pressure will need to be checked often. Your kidney function and electrolytes may also need to be checked. Call your doctor if you are sick with vomiting or diarrhea, or if you are sweating more than usual. You can easily become dehydrated while taking lisinopril. This can lead to very low blood pressure, a serious electrolyte imbalance, or kidney failure. If you need surgery, tell the surgeon ahead of time that you are using lisinopril. If you have high blood pressure, keep using this medicine even if you feel well. High blood pressure often has no symptoms. You may need to use blood pressure medicine for the rest of your life. Store at room temperature away from moisture and heat. Do not freeze the oral liquid. What happens if I miss a dose? Take the medicine as soon as you can, but skip the missed dose if it is almost time for your next dose. Do not take two doses at one time. What happens if I overdose? Seek emergency medical attention or call the Poison Help line at 1-433.138.4581. What should I avoid while taking lisinopril? Drinking alcohol can further lower your blood pressure and may increase certain side effects of lisinopril. Avoid becoming overheated or dehydrated during exercise, in hot weather, or by not drinking enough fluids. Lisinopril can decrease sweating and you may be more prone to heat stroke.   Do not use potassium supplements or salt substitutes, unless your doctor has told you to. Avoid getting up too fast from a sitting or lying position, or you may feel dizzy. What are the possible side effects of lisinopril? Get emergency medical help if you have signs of an allergic reaction: hives; severe stomach pain; difficulty breathing; swelling of your face, lips, tongue, or throat. You may be more likely to have an allergic reaction if you are -American. Call your doctor at once if you have:  · a light-headed feeling, like you might pass out;  · fever, sore throat;  · high potassium --nausea, weakness, tingly feeling, chest pain, irregular heartbeats, loss of movement;  · kidney problems --little or no urination, swelling in your feet or ankles, feeling tired or short of breath; or  · liver problems --nausea, upper stomach pain, itching, tired feeling, loss of appetite, dark urine, ronald-colored stools, jaundice (yellowing of the skin or eyes). Common side effects may include:  · headache, dizziness;  · cough; or  · chest pain. This is not a complete list of side effects and others may occur. Call your doctor for medical advice about side effects. You may report side effects to FDA at 4-720-FDA-3824. What other drugs will affect lisinopril? Tell your doctor about all your other medicines, especially:  · a diuretic or \"water pill\";  · lithium;  · gold injections to treat arthritis;  · insulin or oral diabetes medicine;  · a potassium supplement;  · medicine to prevent organ transplant rejection --everolimus, sirolimus, tacrolimus, temsirolimus; or  · NSAIDs (nonsteroidal anti-inflammatory drugs) --aspirin, ibuprofen (Advil, Motrin), naproxen (Aleve), celecoxib, diclofenac, indomethacin, meloxicam, and others. This list is not complete. Other drugs may affect lisinopril, including prescription and over-the-counter medicines, vitamins, and herbal products. Not all possible drug interactions are listed here. Where can I get more information?   Your pharmacist can provide more information about lisinopril. Remember, keep this and all other medicines out of the reach of children, never share your medicines with others, and use this medication only for the indication prescribed. Every effort has been made to ensure that the information provided by [address] is accurate, up-to-date, and complete, but no guarantee is made to that effect. Drug information contained herein may be time sensitive. [facility] information has been compiled for use by healthcare practitioners and consumers in the [place] and therefore [facility] does not warrant that uses outside of the [place] are appropriate, unless specifically indicated otherwise. [facility]'s drug information does not endorse drugs, diagnose patients or recommend therapy. [facility]'s drug information is an informational resource designed to assist licensed healthcare practitioners in caring for their patients and/or to serve consumers viewing this service as a supplement to, and not a substitute for, the expertise, skill, knowledge and judgment of healthcare practitioners. The absence of a warning for a given drug or drug combination in no way should be construed to indicate that the drug or drug combination is safe, effective or appropriate for any given patient. [facility] does not assume any responsibility for any aspect of healthcare administered with the aid of information [facility] provides. The information contained herein is not intended to cover all possible uses, directions, precautions, warnings, drug interactions, allergic reactions, or adverse effects. If you have questions about the drugs you are taking, check with your doctor, nurse or pharmacist.  Copyright 8147-6537 Jose Luis [address] [place] [address] Avenue: .03. Revision date: 10/22/2019. Care instructions adapted under license by [facility] [place] ([facility]).  If you have questions about a medical condition or this instruction, always ask your healthcare professional. [address] any warranty or liability for your use of this information.

## 2021-01-22 NOTE — PROGRESS NOTES
400 UNIT capsule Take 400 Units by mouth daily      senna-docusate (PERICOLACE) 8.6-50 MG per tablet TAKE TWO TABLETS BY MOUTH DAILY AS NEEDED FOR CONSTIPATION 30 tablet 0    hydrocortisone 2.5 % cream Apply once daily to corners of mouth 60 g 5    ketoconazole (NIZORAL) 2 % cream Apply once daily to corners of mouth 30 g 2    albuterol sulfate HFA (VENTOLIN HFA) 108 (90 Base) MCG/ACT inhaler Inhale 2 puffs into the lungs every 6 hours as needed for Wheezing 1 Inhaler 3    Magic Mouthwash (MIRACLE MOUTHWASH) Take 15 mLs by mouth as needed       medroxyPROGESTERone (PROVERA) 10 MG tablet Take 10 mg by mouth as needed       SITagliptin (JANUVIA) 25 MG tablet Take 100 mg by mouth daily       pantoprazole sodium (PROTONIX) 40 MG PACK packet Take 40 mg by mouth daily      metoprolol succinate (TOPROL XL) 25 MG extended release tablet Take 2 tablets by mouth daily (Patient taking differently: Take 50 mg by mouth daily 50 mg in the morning, 25 mg at night) 60 tablet 3    ranolazine (RANEXA) 1000 MG extended release tablet Take 1,000 mg by mouth      insulin glargine (TOUJEO SOLOSTAR) 300 UNIT/ML injection pen Inject 23 Units into the skin daily       nitroGLYCERIN (NITROSTAT) 0.4 MG SL tablet Place 0.4 mg under the tongue every 5 minutes as needed for Chest pain Dissolve 1 tab under tongue at first sign of chest pain. May repeat every 5 minutes until relief is obtained. If pain persists after taking 3 tabs in a 15-minute period, or the pain is different than is typically experienced, call 9-1-1 immediately.       clonazePAM (KLONOPIN) 0.5 MG tablet Take 1 tablet by mouth 2 times daily as needed for Anxiety 20 tablet 0    TRUETEST TEST strip   0    TRUEPLUS LANCETS 33G MISC   0    albuterol (PROVENTIL) (2.5 MG/3ML) 0.083% nebulizer solution Take 3 mLs by nebulization every 6 hours as needed for Wheezing 120 each 3    albuterol (PROVENTIL HFA;VENTOLIN HFA) 108 (90 BASE) MCG/ACT inhaler Inhale 2 puffs into the lungs every 6 hours as needed. 1 Inhaler 3    fluticasone (FLONASE ALLERGY RELIEF) 50 MCG/ACT nasal spray 1 spray in each nostril      gabapentin (NEURONTIN) 100 MG capsule Take 2 capsules by mouth 2 times daily for 28 days. Take 150 mg BID for 4 weeks (Patient not taking: Reported on 12/4/2020) 112 capsule 0    ibuprofen (ADVIL;MOTRIN) 800 MG tablet TAKE 1 TABLET BY MOUTH EVERY 8 HOURS AS NEEDED FOR PAIN (Patient not taking: Reported on 11/5/2020) 30 tablet 1    ondansetron (ZOFRAN) 4 MG tablet Take 1 tablet by mouth daily as needed for Nausea or Vomiting (Patient not taking: Reported on 11/5/2020) 20 tablet 0    nystatin (MYCOSTATIN) 166887 UNIT/ML suspension Take 5 mLs by mouth 4 times daily Swish and spit (Patient not taking: Reported on 11/5/2020) 120 mL 1    ibuprofen (ADVIL;MOTRIN) 800 MG tablet Take 1 tablet by mouth every 8 hours as needed for Pain 90 tablet 6     No current facility-administered medications for this visit. Facility-Administered Medications Ordered in Other Visits   Medication Dose Route Frequency Provider Last Rate Last Admin    nitroGLYCERIN (NITROSTAT) SL tablet 0.4 mg  0.4 mg Sublingual Q5 Min PRN Danuta Cabrera MD   0.4 mg at 09/16/16 0857    0.9 % sodium chloride infusion   Intravenous Continuous Danuta Cabrera MD 20 mL/hr at 09/16/16 0857 New Bag at 09/16/16 0857     ALLERGIES:    Allergies   Allergen Reactions    Bactrim [Sulfamethoxazole-Trimethoprim]     Biaxin [Clarithromycin]     Ceclor [Cefaclor]     Vancomycin      Itching, burning       Social History     Tobacco Use    Smoking status: Current Every Day Smoker     Packs/day: 0.50     Years: 20.00     Pack years: 10.00     Types: Cigarettes    Smokeless tobacco: Never Used   Substance Use Topics    Alcohol use: No     Alcohol/week: 0.0 standard drinks      Body mass index is 28.7 kg/m².   /84   Pulse 72   Temp 97 °F (36.1 °C)   Wt 162 lb (73.5 kg)   SpO2 98%   BMI 28.70 kg/m²     Subjective: HPI    52 yo female with an extended past medical history of elevated liver enzymes/hepatitis cirrhosis uncontrolled diabetes mellitus type 2 changes in spine areas degenerative changes, skin changes because she needs to have orders for MRIs. She tells me that she did just see the dermatologist and she tells me that her fingers and nails are in very good shape. Her nails it seems like she has an allergy her skin is very dry very painful. Dermatologist would like me to order some blood work especially vitamin levels. Patient also has been following up with a spine surgeon but she needs to have MRI's done before seeing him. She has a history of chronic cervical pain as well as low back pain. Patient has been seeing an endocrinologist.  She states that she is not on any ACE inhibitor. She is on the Crestor 10 mg a day. She says that she has been over the last couple of months on steroids due to the chronic lung infection. She states that her glucose levels were quite elevated. Review of Systems   Constitutional: Negative for fever and unexpected weight change. Pertinent items are noted in HPI. Objective:   Physical Exam  Constitutional: VS (see above). General appearance: normal development, habitus and attention, no deformities. No distress. Eyes: normal conjunctiva and lids. CAV: RRR, no RMG. No edema lower extremities. Pulmo: CTA bilateral, no WR. Crackles/somewhat rales present throughout. Skin: no rashes, lesions or ulcers. Musculoskeletal: normal gait. Nails: no clubbing or cyanosis. Psychiatric: alert and oriented to place, time and person. Normal mood and affect. A1c today is 10.1. Assessment:       Diagnosis Orders   1. Chronic bilateral low back pain without sciatica  MRI LUMBAR SPINE WO CONTRAST   2.  Chronic cervical pain  MRI CERVICAL SPINE WO CONTRAST   3. Non-seasonal allergic rhinitis, unspecified trigger  Vitamin D 25 Hydroxy    Vitamin B12    Vitamin E    Folate 4. Skin change  Vitamin D 25 Hydroxy    Vitamin B12    Vitamin E    Folate   5. Change in nail appearance  Vitamin D 25 Hydroxy    Vitamin B12    Vitamin E    Folate   6. Type 2 diabetes mellitus without complication, without long-term current use of insulin (HCC)  POCT glycosylated hemoglobin (Hb A1C)       Plan:   MRI ordered. I did order multiple multiple vitamin levels. The patient needs to see endocrinologist and ask them about current recommendations of what to give with a diagnosis of diabetes including ACE including high-dose of statin. Also discussed with patient if she is on a steroid she probably needs to have an additional medication to keep her glucose levels down. The patient will follow up with him. She will call for any questions. No follow-ups on file. Orders Placed This Encounter   Procedures    MRI LUMBAR SPINE WO CONTRAST     Standing Status:   Future     Standing Expiration Date:   1/22/2022    MRI CERVICAL SPINE WO CONTRAST     Standing Status:   Future     Standing Expiration Date:   1/22/2022    Vitamin D 25 Hydroxy     Standing Status:   Future     Standing Expiration Date:   1/22/2022    Vitamin B12     Standing Status:   Future     Standing Expiration Date:   1/22/2022    Vitamin E     Standing Status:   Future     Standing Expiration Date:   1/22/2022    Folate     Standing Status:   Future     Standing Expiration Date:   1/22/2022    POCT glycosylated hemoglobin (Hb A1C)     No orders of the defined types were placed in this encounter. Call or return to clinic prn if these symptoms worsen or fail to improve as anticipated. I have reviewed the instructions with the patient, answering all questions to her satisfaction. Lenora received counseling on the following healthy behaviors: nutrition, exercise and medication adherence  Reviewed prior labs and health maintenance. Continue current medications, diet and exercise.   Discussed use, benefit, and side effects of

## 2021-01-25 ENCOUNTER — HOSPITAL ENCOUNTER (OUTPATIENT)
Age: 51
Setting detail: SPECIMEN
Discharge: HOME | End: 2021-01-25
Payer: COMMERCIAL

## 2021-01-25 ENCOUNTER — TELEPHONE (OUTPATIENT)
Dept: FAMILY MEDICINE CLINIC | Age: 51
End: 2021-01-25

## 2021-01-25 DIAGNOSIS — K74.69 OTHER CIRRHOSIS OF LIVER (HCC): ICD-10-CM

## 2021-01-25 DIAGNOSIS — R16.1 SPLENOMEGALY: ICD-10-CM

## 2021-01-25 LAB
ABSOLUTE EOS #: 0.21 K/UL (ref 0–0.44)
ABSOLUTE IMMATURE GRANULOCYTE: 0.03 K/UL (ref 0–0.3)
ABSOLUTE LYMPH #: 1.98 K/UL (ref 1.1–3.7)
ABSOLUTE MONO #: 0.38 K/UL (ref 0.1–1.2)
AFP: 3.6 UG/L
AFP: 3.9 UG/L
ALBUMIN SERPL-MCNC: 4.1 G/DL (ref 3.5–5.2)
ALBUMIN SERPL-MCNC: 4.2 G/DL (ref 3.5–5.2)
ALBUMIN/GLOBULIN RATIO: 1.5 (ref 1–2.5)
ALBUMIN/GLOBULIN RATIO: 1.6 (ref 1–2.5)
ALP BLD-CCNC: 62 U/L (ref 35–104)
ALP BLD-CCNC: 63 U/L (ref 35–104)
ALT SERPL-CCNC: 17 U/L (ref 5–33)
ALT SERPL-CCNC: 18 U/L (ref 5–33)
ANION GAP SERPL CALCULATED.3IONS-SCNC: 15 MMOL/L (ref 9–17)
AST SERPL-CCNC: 18 U/L
AST SERPL-CCNC: 19 U/L
BASOPHILS # BLD: 1 % (ref 0–2)
BASOPHILS ABSOLUTE: 0.05 K/UL (ref 0–0.2)
BILIRUB SERPL-MCNC: 0.45 MG/DL (ref 0.3–1.2)
BILIRUB SERPL-MCNC: 0.47 MG/DL (ref 0.3–1.2)
BILIRUBIN DIRECT: 0.11 MG/DL
BILIRUBIN, INDIRECT: 0.36 MG/DL (ref 0–1)
BUN BLDV-MCNC: 9 MG/DL (ref 6–20)
BUN/CREAT BLD: ABNORMAL (ref 9–20)
C-REACTIVE PROTEIN: <3 MG/L (ref 0–5)
CALCIUM SERPL-MCNC: 9.3 MG/DL (ref 8.6–10.4)
CHLORIDE BLD-SCNC: 105 MMOL/L (ref 98–107)
CO2: 20 MMOL/L (ref 20–31)
CREAT SERPL-MCNC: 0.74 MG/DL (ref 0.5–0.9)
DIFFERENTIAL TYPE: ABNORMAL
EOSINOPHILS RELATIVE PERCENT: 3 % (ref 1–4)
GFR AFRICAN AMERICAN: >60 ML/MIN
GFR NON-AFRICAN AMERICAN: >60 ML/MIN
GFR SERPL CREATININE-BSD FRML MDRD: ABNORMAL ML/MIN/{1.73_M2}
GFR SERPL CREATININE-BSD FRML MDRD: ABNORMAL ML/MIN/{1.73_M2}
GLOBULIN: NORMAL G/DL (ref 1.5–3.8)
GLUCOSE BLD-MCNC: 242 MG/DL (ref 70–99)
HCT VFR BLD CALC: 41.4 % (ref 36.3–47.1)
HCT VFR BLD CALC: 41.9 % (ref 36.3–47.1)
HEMOGLOBIN: 13.6 G/DL (ref 11.9–15.1)
HEMOGLOBIN: 13.6 G/DL (ref 11.9–15.1)
IMMATURE GRANULOCYTES: 0 %
INR BLD: 1
LYMPHOCYTES # BLD: 25 % (ref 24–43)
MCH RBC QN AUTO: 28.4 PG (ref 25.2–33.5)
MCH RBC QN AUTO: 28.6 PG (ref 25.2–33.5)
MCHC RBC AUTO-ENTMCNC: 32.5 G/DL (ref 28.4–34.8)
MCHC RBC AUTO-ENTMCNC: 32.9 G/DL (ref 28.4–34.8)
MCV RBC AUTO: 87 FL (ref 82.6–102.9)
MCV RBC AUTO: 87.5 FL (ref 82.6–102.9)
MONOCYTES # BLD: 5 % (ref 3–12)
NRBC AUTOMATED: 0 PER 100 WBC
NRBC AUTOMATED: 0 PER 100 WBC
PDW BLD-RTO: 12.7 % (ref 11.8–14.4)
PDW BLD-RTO: 12.8 % (ref 11.8–14.4)
PLATELET # BLD: 133 K/UL (ref 138–453)
PLATELET # BLD: 135 K/UL (ref 138–453)
PLATELET ESTIMATE: ABNORMAL
PMV BLD AUTO: 12 FL (ref 8.1–13.5)
PMV BLD AUTO: 12 FL (ref 8.1–13.5)
POTASSIUM SERPL-SCNC: 4 MMOL/L (ref 3.7–5.3)
PROTHROMBIN TIME: 10 SEC (ref 9–12)
RBC # BLD: 4.76 M/UL (ref 3.95–5.11)
RBC # BLD: 4.79 M/UL (ref 3.95–5.11)
RBC # BLD: ABNORMAL 10*6/UL
SEG NEUTROPHILS: 66 % (ref 36–65)
SEGMENTED NEUTROPHILS ABSOLUTE COUNT: 5.31 K/UL (ref 1.5–8.1)
SODIUM BLD-SCNC: 140 MMOL/L (ref 135–144)
TOTAL PROTEIN: 6.8 G/DL (ref 6.4–8.3)
TOTAL PROTEIN: 6.9 G/DL (ref 6.4–8.3)
WBC # BLD: 8 K/UL (ref 3.5–11.3)
WBC # BLD: 8 K/UL (ref 3.5–11.3)
WBC # BLD: ABNORMAL 10*3/UL

## 2021-01-25 RX ORDER — IBUPROFEN 800 MG/1
800 TABLET ORAL EVERY 8 HOURS PRN
Qty: 30 TABLET | Refills: 1 | Status: SHIPPED | OUTPATIENT
Start: 2021-01-25 | End: 2021-11-18

## 2021-01-25 RX ORDER — TRAMADOL HYDROCHLORIDE 50 MG/1
50 TABLET ORAL 2 TIMES DAILY
Qty: 10 TABLET | Refills: 0 | Status: SHIPPED | OUTPATIENT
Start: 2021-01-25 | End: 2021-01-30

## 2021-01-25 RX ORDER — DOXYCYCLINE HYCLATE 100 MG
TABLET ORAL
Qty: 20 TABLET | Refills: 0 | Status: SHIPPED | OUTPATIENT
Start: 2021-01-25 | End: 2021-02-04

## 2021-01-25 NOTE — TELEPHONE ENCOUNTER
Says doxycycline and ibuprophen 800mg , were supposed to be sent to her pharmacy looks like transmission failed on the doxycyline, could you resend

## 2021-01-25 NOTE — RESULT ENCOUNTER NOTE
Slight protein in her urine. Patient needs to see the endocrinologist as discussed with her. Thank you.

## 2021-01-26 LAB — ANTI-NUCLEAR ANTIBODY (ANA): NEGATIVE

## 2021-03-24 ENCOUNTER — HOSPITAL ENCOUNTER (OUTPATIENT)
Dept: CT IMAGING | Facility: CLINIC | Age: 51
Discharge: HOME | End: 2021-03-26
Payer: COMMERCIAL

## 2021-03-24 DIAGNOSIS — J47.1 BRONCHIECTASIS WITH ACUTE EXACERBATION (HCC): ICD-10-CM

## 2021-03-24 PROCEDURE — 71250 CT THORAX DX C-: CPT

## 2021-04-05 ENCOUNTER — TELEPHONE (OUTPATIENT)
Dept: FAMILY MEDICINE CLINIC | Age: 51
End: 2021-04-05

## 2021-04-08 ENCOUNTER — HOSPITAL ENCOUNTER (OUTPATIENT)
Facility: CLINIC | Age: 51
Discharge: HOME | End: 2021-04-08
Payer: COMMERCIAL

## 2021-04-08 ENCOUNTER — HOSPITAL ENCOUNTER (OUTPATIENT)
Dept: ULTRASOUND IMAGING | Facility: CLINIC | Age: 51
Discharge: HOME | End: 2021-04-10
Payer: COMMERCIAL

## 2021-04-08 DIAGNOSIS — K74.60 HEPATIC CIRRHOSIS, UNSPECIFIED HEPATIC CIRRHOSIS TYPE, UNSPECIFIED WHETHER ASCITES PRESENT (HCC): ICD-10-CM

## 2021-04-08 DIAGNOSIS — L60.8 CHANGE IN NAIL APPEARANCE: ICD-10-CM

## 2021-04-08 DIAGNOSIS — R23.9 SKIN CHANGE: ICD-10-CM

## 2021-04-08 DIAGNOSIS — J30.89 NON-SEASONAL ALLERGIC RHINITIS, UNSPECIFIED TRIGGER: ICD-10-CM

## 2021-04-08 DIAGNOSIS — K76.9 LIVER DISEASE: ICD-10-CM

## 2021-04-08 LAB
AFP: 3.2 UG/L
ALT SERPL-CCNC: 18 U/L (ref 5–33)
AST SERPL-CCNC: 17 U/L
C-PEPTIDE: 4.9 NG/ML (ref 1.1–4.4)
CHOLESTEROL, FASTING: 162 MG/DL
CHOLESTEROL/HDL RATIO: 2.9
ESTIMATED AVERAGE GLUCOSE: 240 MG/DL
FOLATE: 12.7 NG/ML
GLUCOSE FASTING: 205 MG/DL (ref 70–99)
HBA1C MFR BLD: 10 % (ref 4–6)
HDLC SERPL-MCNC: 55 MG/DL
LDL CHOLESTEROL: 84 MG/DL (ref 0–130)
THYROXINE, FREE: 0.97 NG/DL (ref 0.93–1.7)
TRIGLYCERIDE, FASTING: 116 MG/DL
TSH SERPL DL<=0.05 MIU/L-ACNC: 2.36 MIU/L (ref 0.3–5)
VITAMIN B-12: 811 PG/ML (ref 232–1245)
VITAMIN D 25-HYDROXY: 40.4 NG/ML (ref 30–100)
VLDLC SERPL CALC-MCNC: NORMAL MG/DL (ref 1–30)

## 2021-04-08 PROCEDURE — 76705 ECHO EXAM OF ABDOMEN: CPT

## 2021-04-08 PROCEDURE — 83036 HEMOGLOBIN GLYCOSYLATED A1C: CPT

## 2021-04-08 PROCEDURE — 86341 ISLET CELL ANTIBODY: CPT

## 2021-04-08 PROCEDURE — 80061 LIPID PANEL: CPT

## 2021-04-08 PROCEDURE — 84450 TRANSFERASE (AST) (SGOT): CPT

## 2021-04-08 PROCEDURE — 82105 ALPHA-FETOPROTEIN SERUM: CPT

## 2021-04-08 PROCEDURE — 36415 COLL VENOUS BLD VENIPUNCTURE: CPT

## 2021-04-08 PROCEDURE — 84460 ALANINE AMINO (ALT) (SGPT): CPT

## 2021-04-08 PROCEDURE — 84681 ASSAY OF C-PEPTIDE: CPT

## 2021-04-08 PROCEDURE — 82306 VITAMIN D 25 HYDROXY: CPT

## 2021-04-08 PROCEDURE — 82746 ASSAY OF FOLIC ACID SERUM: CPT

## 2021-04-08 PROCEDURE — 82947 ASSAY GLUCOSE BLOOD QUANT: CPT

## 2021-04-08 PROCEDURE — 84446 ASSAY OF VITAMIN E: CPT

## 2021-04-08 PROCEDURE — 84439 ASSAY OF FREE THYROXINE: CPT

## 2021-04-08 PROCEDURE — 84443 ASSAY THYROID STIM HORMONE: CPT

## 2021-04-08 PROCEDURE — 82607 VITAMIN B-12: CPT

## 2021-04-10 LAB
ALPHA-TOCOPHEROL: 10.7 MG/L (ref 5.5–18)
GAMMA-TOCOPHEROL: 1.6 MG/L (ref 0–6)
GLUTAMIC ACID DECARB AB: <5 IU/ML (ref 0–5)

## 2021-04-11 LAB — ISLET CELL ANTIBODY: NORMAL

## 2021-04-12 NOTE — RESULT ENCOUNTER NOTE
Blood work was with normal limits. Please continue to follow-up with specialist including dermatologist as well as specialist at [facility]. Thank you.

## 2021-04-16 ENCOUNTER — OFFICE VISIT (OUTPATIENT)
Dept: FAMILY MEDICINE CLINIC | Age: 51
End: 2021-04-16
Payer: COMMERCIAL

## 2021-04-16 VITALS
SYSTOLIC BLOOD PRESSURE: 134 MMHG | TEMPERATURE: 97.4 F | DIASTOLIC BLOOD PRESSURE: 89 MMHG | OXYGEN SATURATION: 93 % | HEART RATE: 65 BPM | BODY MASS INDEX: 28.56 KG/M2 | WEIGHT: 161.2 LBS

## 2021-04-16 DIAGNOSIS — R74.8 ELEVATED LIVER ENZYMES: ICD-10-CM

## 2021-04-16 DIAGNOSIS — Z20.822 EXPOSURE TO COVID-19 VIRUS: ICD-10-CM

## 2021-04-16 DIAGNOSIS — E11.9 TYPE 2 DIABETES MELLITUS WITHOUT COMPLICATION, WITHOUT LONG-TERM CURRENT USE OF INSULIN (HCC): Primary | ICD-10-CM

## 2021-04-16 DIAGNOSIS — K75.81 NASH (NONALCOHOLIC STEATOHEPATITIS): ICD-10-CM

## 2021-04-16 DIAGNOSIS — N91.1 SECONDARY AMENORRHEA: ICD-10-CM

## 2021-04-16 PROCEDURE — 99213 OFFICE O/P EST LOW 20 MIN: CPT | Performed by: FAMILY MEDICINE

## 2021-04-16 RX ORDER — BLOOD-GLUCOSE,RECEIVER,CONT
1 EACH MISCELLANEOUS DAILY
Qty: 1 DEVICE | Refills: 0 | Status: SHIPPED | OUTPATIENT
Start: 2021-04-16 | End: 2022-02-18

## 2021-04-16 RX ORDER — BLOOD-GLUCOSE SENSOR
1 EACH MISCELLANEOUS WEEKLY
Qty: 3 EACH | Refills: 3 | Status: SHIPPED | OUTPATIENT
Start: 2021-04-16 | End: 2022-02-18

## 2021-04-16 RX ORDER — BLOOD-GLUCOSE TRANSMITTER
1 EACH MISCELLANEOUS WEEKLY
Qty: 1 EACH | Refills: 0 | Status: SHIPPED | OUTPATIENT
Start: 2021-04-16 | End: 2022-02-18

## 2021-04-16 ASSESSMENT — PATIENT HEALTH QUESTIONNAIRE - PHQ9
2. FEELING DOWN, DEPRESSED OR HOPELESS: 1
SUM OF ALL RESPONSES TO PHQ QUESTIONS 1-9: 1
SUM OF ALL RESPONSES TO PHQ9 QUESTIONS 1 & 2: 1

## 2021-04-16 NOTE — PROGRESS NOTES
General FM note    French Gutiérrez is a 53 y.o. female who presents today for follow up on her  medical conditions as noted below. French Gutiérrez is c/o of   Chief Complaint   Patient presents with    Other     paperwork    Leg Pain     left       Patient Active Problem List:     Fatigue     GERD (gastroesophageal reflux disease)     Obesity (BMI 30-39. 9)     Diabetes mellitus (HCC)     Snoring     Elevated liver enzymes     Unstable angina (HCC)     Positive cardiac stress test     Anomalous coronary artery origin     Trigger thumb of right hand     Past Medical History:   Diagnosis Date    Anomalous coronary artery origin 2016    Anxiety     Asthma     Back pain     DDD (degenerative disc disease), cervical     DDD (degenerative disc disease), lumbar     Diabetes mellitus ([facility] [address]     Fatty liver     Mitral prolapse     Pneumonia     Thoracic outlet syndrome     Thyroid goiter     had biopsy      Past Surgical History:   Procedure Laterality Date    BRONCHOSCOPY      CARDIAC SURGERY      x 1    CERVICAL SPINE SURGERY      plates placed     SECTION      x 2    COLONOSCOPY      ENDOSCOPY, COLON, DIAGNOSTIC      EGD    FINGER TRIGGER RELEASE Right 10/9/2019    THUMB TRIGGER RELEASE performed by Wilmar Bledsoe MD at [address]      abdomen    [place] SONO GUIDE NEEDLE BIOPSY      TONSILLECTOMY      TUBAL LIGATION       Family History   Problem Relation Age of Onset    Diabetes Mother     Diabetes Paternal Grandmother      Current Outpatient Medications   Medication Sig Dispense Refill    Continuous Blood Gluc  ([address] [place] [address] G5 MOBILE ) KOURTNEY 1 each by Does not apply route daily 1 Device 0    Continuous Blood Gluc Sensor (DEXCOM G6 SENSOR) MISC 1 each by Does not apply route once a week 3 each 3    Continuous Blood Gluc Transmit (DEXCOM G5 MOBILE TRANSMITTER) MISC 1 each by Does not apply route once a week 1 each 0    ibuprofen (ADVIL;MOTRIN) 800 MG tablet Take 1 tablet by mouth every 8 hours as needed for Pain 30 tablet 1    JANUVIA 100 MG tablet TAKE 1 TABLET BY MOUTH EVERY DAY      rosuvastatin (CRESTOR) 10 MG tablet TAKE 1 TABLET BY MOUTH EVERY DAY      fluticasone (FLONASE ALLERGY RELIEF) 50 MCG/ACT nasal spray 1 spray in each nostril      mometasone-formoterol (DULERA) 100-5 MCG/ACT inhaler Inhale 2 puffs into the lungs 2 times daily Rinse mouth after using. 1 Inhaler 2    vitamin E 400 UNIT capsule Take 400 Units by mouth daily      senna-docusate (PERICOLACE) 8.6-50 MG per tablet TAKE TWO TABLETS BY MOUTH DAILY AS NEEDED FOR CONSTIPATION 30 tablet 0    hydrocortisone 2.5 % cream Apply once daily to corners of mouth 60 g 5    ketoconazole (NIZORAL) 2 % cream Apply once daily to corners of mouth 30 g 2    albuterol sulfate HFA (VENTOLIN HFA) 108 (90 Base) MCG/ACT inhaler Inhale 2 puffs into the lungs every 6 hours as needed for Wheezing 1 Inhaler 3    Magic Mouthwash (MIRACLE MOUTHWASH) Take 15 mLs by mouth as needed       medroxyPROGESTERone (PROVERA) 10 MG tablet Take 10 mg by mouth as needed       SITagliptin (JANUVIA) 25 MG tablet Take 100 mg by mouth daily       pantoprazole sodium (PROTONIX) 40 MG PACK packet Take 40 mg by mouth daily      metoprolol succinate (TOPROL XL) 25 MG extended release tablet Take 2 tablets by mouth daily (Patient taking differently: Take 50 mg by mouth daily 50 mg in the morning, 25 mg at night) 60 tablet 3    ranolazine (RANEXA) 1000 MG extended release tablet Take 1,000 mg by mouth      insulin glargine (TOUJEO SOLOSTAR) 300 UNIT/ML injection pen Inject 23 Units into the skin daily       nitroGLYCERIN (NITROSTAT) 0.4 MG SL tablet Place 0.4 mg under the tongue every 5 minutes as needed for Chest pain Dissolve 1 tab under tongue at first sign of chest pain. May repeat every 5 minutes until relief is obtained.  If pain persists after taking 3 tabs in a 15-minute period, or the pain is different than is typically Temp 97.4 °F (36.3 °C)   Wt 161 lb 3.2 oz (73.1 kg)   SpO2 93%   BMI 28.56 kg/m²     Subjective:      HPI    52 yo female coming today actually to  disability paperwork. Patient has a history of Arville [place]en treated by specialist.  She also has a history of uncontrolled type 2 diabetes mellitus. She states that she was seen by her recent endocrinologist here in [facility]. She asking about renal protective medication. She felt that she was somewhat brushed off. She did make an appointment with [facility]. She tells me that she has lost multiple pounds again her liver status is in a good range. Her last A1c was 10. So she is not sure if she should start taking insulin but again she will see endocrinologist.    Patient also states that she has been following up with pain specialty. Has been following up with dermatologist.    Review of Systems   Constitutional: Negative for fever and unexpected weight change. Pertinent items are noted in HPI. Objective:   Physical Exam  Constitutional: VS (see above). General appearance: normal development, habitus and attention, no deformities. No distress. Eyes: normal conjunctiva and lids. CAV: RRR, no RMG. No edema lower extremities. Pulmo: CTA bilateral, no CWR. Skin: no rashes, lesions or ulcers. Musculoskeletal: normal gait. Nails: no clubbing or cyanosis. Psychiatric: alert and oriented to place, time and person. Normal mood and affect. Assessment:       Diagnosis Orders   1. Type 2 diabetes mellitus without complication, without long-term current use of insulin (HCC)  Continuous Blood Gluc  (DEXCOM G5 MOBILE ) KOURTNEY    Continuous Blood Gluc Sensor (DEXCOM G6 SENSOR) MISC    Continuous Blood Gluc Transmit (DEXCOM G5 MOBILE TRANSMITTER) MISC    Hepatitis B Surface Antibody    Hepatitis A Antibody, Total   2. Elevated liver enzymes  Hepatitis B Surface Antibody    Hepatitis A Antibody, Total   3. congregate (group) care setting? Answer:   No     Order Specific Question:   Pregnant? Answer:   No     Order Specific Question:   Previously tested for COVID-19? Answer: Yes    Follicle Stimulating Hormone     Standing Status:   Future     Standing Expiration Date:   2022    Luteinizing Hormone     Standing Status:   Future     Standing Expiration Date:   2022     Orders Placed This Encounter   Medications    Continuous Blood Gluc  (DEXCOM G5 MOBILE ) KOURTNEY     Si each by Does not apply route daily     Dispense:  1 Device     Refill:  0    Continuous Blood Gluc Sensor (DEXCOM G6 SENSOR) MISC     Si each by Does not apply route once a week     Dispense:  3 each     Refill:  3    Continuous Blood Gluc Transmit (DEXCOM G5 MOBILE TRANSMITTER) MISC     Si each by Does not apply route once a week     Dispense:  1 each     Refill:  0       Call or return to clinic prn if these symptoms worsen or fail to improve as anticipated. I have reviewed the instructions with the patient, answering all questions to her satisfaction. Lenora received counseling on the following healthy behaviors: nutrition, exercise and medication adherence  Reviewed prior labs and health maintenance. Continue current medications, diet and exercise. Discussed use, benefit, and side effects of prescribed medications. Barriers to medication compliance addressed. Patient given educational materials - see patient instructions. All patient questions answered. Patient voiced understanding.       Electronically signed by Madelin Molina MD on 2021 at 9:19 AM       (Please note that portions of this note were completed with a voice recognition program. Efforts were made to edit the dictations but occasionally words are mis-transcribed.)

## 2021-04-23 LAB — ANTIBODY: NORMAL

## 2021-04-26 DIAGNOSIS — Z20.822 EXPOSURE TO COVID-19 VIRUS: ICD-10-CM

## 2021-04-26 DIAGNOSIS — R74.8 ELEVATED LIVER ENZYMES: ICD-10-CM

## 2021-04-26 DIAGNOSIS — E11.9 TYPE 2 DIABETES MELLITUS WITHOUT COMPLICATION, WITHOUT LONG-TERM CURRENT USE OF INSULIN (HCC): ICD-10-CM

## 2021-04-26 DIAGNOSIS — N91.1 SECONDARY AMENORRHEA: ICD-10-CM

## 2021-04-26 DIAGNOSIS — K75.81 NASH (NONALCOHOLIC STEATOHEPATITIS): ICD-10-CM

## 2021-04-27 NOTE — RESULT ENCOUNTER NOTE
The blood work hormonal function test suggested that the patient is postmenopausal.  This seems to be some blood work missing anti hep B and anti have a. thank you.

## 2021-05-03 ENCOUNTER — TELEPHONE (OUTPATIENT)
Dept: FAMILY MEDICINE CLINIC | Age: 51
End: 2021-05-03

## 2021-05-04 ENCOUNTER — HOSPITAL ENCOUNTER (OUTPATIENT)
Dept: MRI IMAGING | Facility: CLINIC | Age: 51
Discharge: HOME | End: 2021-05-06
Payer: COMMERCIAL

## 2021-05-04 ENCOUNTER — TELEPHONE (OUTPATIENT)
Dept: FAMILY MEDICINE CLINIC | Age: 51
End: 2021-05-04

## 2021-05-04 DIAGNOSIS — M54.2 CHRONIC CERVICAL PAIN: ICD-10-CM

## 2021-05-04 DIAGNOSIS — G89.29 CHRONIC CERVICAL PAIN: ICD-10-CM

## 2021-05-04 PROCEDURE — 72141 MRI NECK SPINE W/O DYE: CPT

## 2021-05-04 NOTE — TELEPHONE ENCOUNTER
Patient is negative for have a. She is also negative/does not have a immunity against hepatitis B. She needs to start vaccines hepatitis B series. She also needs to stop the statins. If the muscle aches get better then it is probably related to statins. Thank you.

## 2021-05-04 NOTE — TELEPHONE ENCOUNTER
Gave pt results of hormone lab work but PCP stated in lab results that Hep A and Hep B did not have results but there are results and pt requests results. Also pt request a \"CK \"lab be done due to her being on cholesterol meds and having bad muscle pain in arms and legs.

## 2021-05-07 ENCOUNTER — TELEPHONE (OUTPATIENT)
Dept: ORTHOPEDIC SURGERY | Age: 51
End: 2021-05-07

## 2021-05-07 ENCOUNTER — OFFICE VISIT (OUTPATIENT)
Dept: ORTHOPEDIC SURGERY | Age: 51
End: 2021-05-07
Payer: COMMERCIAL

## 2021-05-07 VITALS — HEIGHT: 63 IN | BODY MASS INDEX: 28.17 KG/M2 | WEIGHT: 159 LBS

## 2021-05-07 DIAGNOSIS — M70.50 PES ANSERINE BURSITIS: ICD-10-CM

## 2021-05-07 DIAGNOSIS — M70.61 GREATER TROCHANTERIC BURSITIS OF RIGHT HIP: ICD-10-CM

## 2021-05-07 DIAGNOSIS — M75.81 TENDINITIS OF RIGHT ROTATOR CUFF: Primary | ICD-10-CM

## 2021-05-07 PROCEDURE — 99205 OFFICE O/P NEW HI 60 MIN: CPT | Performed by: PHYSICIAN ASSISTANT

## 2021-05-07 RX ORDER — DICLOFENAC SODIUM 75 MG/1
75 TABLET, DELAYED RELEASE ORAL 2 TIMES DAILY WITH MEALS
Qty: 28 TABLET | Refills: 0 | Status: SHIPPED | OUTPATIENT
Start: 2021-05-07 | End: 2021-06-04 | Stop reason: ALTCHOICE

## 2021-05-07 NOTE — PROGRESS NOTES
[facility], [address] [facility], [address] [place] [address], [address] [facility]           Dept Phone: [phone]           Dept Fax:  [phone], [place]          Dept Phone: [phone]           Dept Fax:  [phone]      Chief Compliant:  Chief Complaint   Patient presents with    New Patient        History of Present Illness: This is a 53 y.o. female who presents to the clinic today for evaluation of multiple complaints. Patient is here reporting right shoulder pain, right hip and left knee pain. Right shoulder pain:  Patient reports a 1 month history of right shoulder pain. She describes this as a \"achy\" pain. Pain is aggravated by any overhead motion as well as heavy lifting on this right side she also has some associated weakness secondary to pain. Of note patient with chronic neck pain that remains unchanged. She had a recent MRI earlier this month but did demonstrate worsening cervical stenosis and degenerative changes. She does have chronic radicular symptoms down the right arm that remain unchanged with this right shoulder pain. Right hip pain:  Right hip pain has been present for approximately 1 month as well no preceding injury, trauma or fall. Pain is mostly to the lateral aspect of the right hip aggravated by prolonged walking, stairs and laying on his right side. Pain does seem to be relieved by rest.  She denies any numbness or tingling radiating down the right lower extremity. Left knee pain:  Patient reports left knee pain has been present for about 2 weeks. Most severe to the posterior medial aspect of the right knee aggravated by any twisting or turning motion as well as deep bending. Patient denies any significant swelling, bruising, knee joint warmth, redness, fever or chills.     Past History:    Current Outpatient Medications:     Continuous Blood Gluc  ([address] [place] [address] G5 MOBILE ) KOURTNEY, 1 each by Does not apply route daily, Disp: 1 Device, Rfl: 0    Continuous Blood Gluc Sensor (DEXCOM G6 SENSOR) MISC, 1 each by Does not apply route once a week, Disp: 3 each, Rfl: 3    Continuous Blood Gluc Transmit (DEXCOM G5 MOBILE TRANSMITTER) MISC, 1 each by Does not apply route once a week, Disp: 1 each, Rfl: 0    ibuprofen (ADVIL;MOTRIN) 800 MG tablet, Take 1 tablet by mouth every 8 hours as needed for Pain, Disp: 30 tablet, Rfl: 1    JANUVIA 100 MG tablet, TAKE 1 TABLET BY MOUTH EVERY DAY, Disp: , Rfl:     rosuvastatin (CRESTOR) 10 MG tablet, TAKE 1 TABLET BY MOUTH EVERY DAY, Disp: , Rfl:     fluticasone (FLONASE ALLERGY RELIEF) 50 MCG/ACT nasal spray, 1 spray in each nostril, Disp: , Rfl:     gabapentin (NEURONTIN) 100 MG capsule, Take 2 capsules by mouth 2 times daily for 28 days.  Take 150 mg BID for 4 weeks (Patient not taking: Reported on 12/4/2020), Disp: 112 capsule, Rfl: 0    mometasone-formoterol (DULERA) 100-5 MCG/ACT inhaler, Inhale 2 puffs into the lungs 2 times daily Rinse mouth after using., Disp: 1 Inhaler, Rfl: 2    ondansetron (ZOFRAN) 4 MG tablet, Take 1 tablet by mouth daily as needed for Nausea or Vomiting (Patient not taking: Reported on 11/5/2020), Disp: 20 tablet, Rfl: 0    vitamin E 400 UNIT capsule, Take 400 Units by mouth daily, Disp: , Rfl:     senna-docusate (PERICOLACE) 8.6-50 MG per tablet, TAKE TWO TABLETS BY MOUTH DAILY AS NEEDED FOR CONSTIPATION, Disp: 30 tablet, Rfl: 0    hydrocortisone 2.5 % cream, Apply once daily to corners of mouth, Disp: 60 g, Rfl: 5    ketoconazole (NIZORAL) 2 % cream, Apply once daily to corners of mouth, Disp: 30 g, Rfl: 2    albuterol sulfate HFA (VENTOLIN HFA) 108 (90 Base) MCG/ACT inhaler, Inhale 2 puffs into the lungs every 6 hours as needed for Wheezing, Disp: 1 Inhaler, Rfl: 3    nystatin (MYCOSTATIN) 822541 UNIT/ML suspension, Take 5 mLs by mouth 4 times daily Swish and spit (Patient not taking: Reported on 11/5/2020), Disp: 120 mL, Rfl: 1    Magic Mouthwash (MIRACLE MOUTHWASH), Take 15 mLs by mouth as needed , Disp: , Rfl:     medroxyPROGESTERone (PROVERA) 10 MG tablet, Take 10 mg by mouth as needed , Disp: , Rfl:     SITagliptin (JANUVIA) 25 MG tablet, Take 100 mg by mouth daily , Disp: , Rfl:     pantoprazole sodium (PROTONIX) 40 MG PACK packet, Take 40 mg by mouth daily, Disp: , Rfl:     metoprolol succinate (TOPROL XL) 25 MG extended release tablet, Take 2 tablets by mouth daily (Patient taking differently: Take 50 mg by mouth daily 50 mg in the morning, 25 mg at night), Disp: 60 tablet, Rfl: 3    ranolazine (RANEXA) 1000 MG extended release tablet, Take 1,000 mg by mouth, Disp: , Rfl:     insulin glargine (TOUJEO SOLOSTAR) 300 UNIT/ML injection pen, Inject 23 Units into the skin daily , Disp: , Rfl:     nitroGLYCERIN (NITROSTAT) 0.4 MG SL tablet, Place 0.4 mg under the tongue every 5 minutes as needed for Chest pain Dissolve 1 tab under tongue at first sign of chest pain. May repeat every 5 minutes until relief is obtained. If pain persists after taking 3 tabs in a 15-minute period, or the pain is different than is typically experienced, call 9-1-1 immediately. , Disp: , Rfl:     clonazePAM (KLONOPIN) 0.5 MG tablet, Take 1 tablet by mouth 2 times daily as needed for Anxiety, Disp: 20 tablet, Rfl: 0    TRUETEST TEST strip, , Disp: , Rfl: 0    TRUEPLUS LANCETS 33G MISC, , Disp: , Rfl: 0    albuterol (PROVENTIL) (2.5 MG/3ML) 0.083% nebulizer solution, Take 3 mLs by nebulization every 6 hours as needed for Wheezing, Disp: 120 each, Rfl: 3    albuterol (PROVENTIL HFA;VENTOLIN HFA) 108 (90 BASE) MCG/ACT inhaler, Inhale 2 puffs into the lungs every 6 hours as needed. , Disp: 1 Inhaler, Rfl: 3  Allergies   Allergen Reactions    Bactrim [Sulfamethoxazole-Trimethoprim]     Biaxin [Clarithromycin]     Ceclor (Degrees)    Right   A P   Left   A P    Elevation  150 170   Elevation  170   Abduction  130 160   Abduction  170    ER   70 80   ER   80   IR   L1    IR   L1   90 abd/ER      90 abd/ER     90 abd/IR      90 abd/IR     Crepitation  No    Crepitation No  Dyskenesia  No    Dyskenesia No      Muscle strength:    Right       Left    Deltoid   5    Deltoid   5  Supraspinatus  4    Supraspinatus  5  ER   5    ER   5  IR   5    IR   5    Special tests    Right   Rotator Cuff    Left    y   Painful arc    n   n   Pain with ER    n    y   Neer's     n    y   Hawkin's    n    n   Drop Arm    n  n   Lift off/Belly Press   n  y   ER Lag    n          AC Joint  n   AC tenderness   n  n   Cross-chest adduction  n       Labrum/biceps    y   Dickinson's    n   n   Biceps sheer    n      n   Speed's/Yergason's   n    n   Tenderness Biceps Groove  nn    n   Brandon's    n         Instability  n   Ant Apprehension   n    n   Post Apprehension   n    n   Ant Load shift    n    n   Post Load shift   n   n   Sulcus     n  n   Generalized Laxity   n  n   Relocation test   n  n   Crank test     n  n   Arturo-superior escape  n    right Hip    Tenderness: Severe tenderness over the right greater trochanter. Mild tenderness over the proximal IT band.   No tenderness to the anterior posterior hip       Range of Motion:      Extension: 10     Flexion: 120     Internal Rotation: 30     External Rotation: 40     Abduction: 40     Adduction:  30       Muscle Strength      Abduction: 5/5     Adduction: 5/5     Flexion: 5/5         Gait: Antalgic   Christa Test: Negative   Osiel Test: Positive        left Knee    Swelling: None   Effusion: Negative   Tenderness: Medial hamstring and Pes anserinus       Range of Motion:      Extension: 0     Flexion: 130       McMurrays: Negative   Anterior Lachmann: Negative   Posterior Lachmann: Negative   Anterior Drawer: Negative   Posterior Drawer: Negative   Varus Stress Test: Negative   Valgus Stress Test: Negative   Pivot Shift: Negative   Patellar Apprehension: No       NA = Not assessed  RTC = Rotator cuff  RCT = Rotator cuff tear  ER = External rotation  IR = Internal rotation  AC = Acromioclavicular  GH = Glenohumeral  n = No  y = Yes      Neurological: Patient is alert and oriented to person, place, and time. Normal strenght. No sensory deficit. Skin: Skin is warm and dry  Psychiatric: Behavior is normal. Thought content normal.  Nursing note and vitals reviewed. Labs and Imaging:     XR taken today:  No results found. X-rays taken in clinic today and preliminarily reviewed by me:  4 views of the right shoulder demonstrate glenohumeral joint space is maintained. Some mild to moderate hypertrophy of the acromioclavicular joint. No evidence of acute fracture or other acute osseous abnormality noted. Orders Placed This Encounter   Procedures    XR SHOULDER RIGHT (MIN 2 VIEWS)     Standing Status:   Future     Standing Expiration Date:   5/7/2022       Assessment and Plan:  1. Tendinitis of right rotator cuff    2. Greater trochanteric bursitis of right hip    3. Pes anserine bursitis          PLAN:  This is a 53 y.o. female who presents to the clinic today for evaluation of right shoulder, right hip and left knee pain. Right rotator cuff tendonitis:  1.  shoulder pain that is consistent with rotator cuff tendinitis. 2.  We discussed treatment options available to her, including nonoperative and operative intervention. 3. At this time I believe she will benefit from conservative management. Consequently, a extensive home exercise progrma was provided. We also discussed use of a prescription NSAID versus a cortisone injection. 4. Patient did wish to pursue a corticosteroid injection however she does not reveal she just underwent her COVID-19 vaccine yesterday. Would recommend at least a week between the vaccine and a corticosteroid injection.   We will reschedule patient next week for

## 2021-05-14 ENCOUNTER — OFFICE VISIT (OUTPATIENT)
Dept: ORTHOPEDIC SURGERY | Age: 51
End: 2021-05-14
Payer: COMMERCIAL

## 2021-05-14 VITALS — HEIGHT: 63 IN | BODY MASS INDEX: 28.17 KG/M2 | WEIGHT: 159 LBS

## 2021-05-14 DIAGNOSIS — G54.0 THORACIC OUTLET SYNDROME: ICD-10-CM

## 2021-05-14 DIAGNOSIS — M75.81 RIGHT ROTATOR CUFF TENDINITIS: Primary | ICD-10-CM

## 2021-05-14 DIAGNOSIS — M54.2 CERVICAL SPINE PAIN: ICD-10-CM

## 2021-05-14 PROCEDURE — 99214 OFFICE O/P EST MOD 30 MIN: CPT | Performed by: PHYSICIAN ASSISTANT

## 2021-05-14 PROCEDURE — 20610 DRAIN/INJ JOINT/BURSA W/O US: CPT | Performed by: PHYSICIAN ASSISTANT

## 2021-05-14 RX ORDER — TRIAMCINOLONE ACETONIDE 40 MG/ML
40 INJECTION, SUSPENSION INTRA-ARTICULAR; INTRAMUSCULAR ONCE
Status: COMPLETED | OUTPATIENT
Start: 2021-05-14 | End: 2021-05-14

## 2021-05-14 RX ORDER — LIDOCAINE HYDROCHLORIDE 10 MG/ML
2 INJECTION, SOLUTION INFILTRATION; PERINEURAL ONCE
Status: COMPLETED | OUTPATIENT
Start: 2021-05-14 | End: 2021-05-14

## 2021-05-14 RX ADMIN — LIDOCAINE HYDROCHLORIDE 2 ML: 10 INJECTION, SOLUTION INFILTRATION; PERINEURAL at 13:41

## 2021-05-14 RX ADMIN — TRIAMCINOLONE ACETONIDE 40 MG: 40 INJECTION, SUSPENSION INTRA-ARTICULAR; INTRAMUSCULAR at 13:41

## 2021-05-14 NOTE — PROGRESS NOTES
[address] [facility], [address] [facility], [address] [facility], [address] [facility]           Dept Phone: [phone]           Dept Fax:  [phone] [address] [facility]           [place]          Dept Phone: [phone]           Dept Fax:  [phone]      Chief Compliant:  Chief Complaint   Patient presents with    Follow-up     Right shoulder injection         History of Present Illness:  Lenora returns today. This is a 53 y.o. female who presents to the clinic today for follow up of right-sided neck and right shoulder pain. Please see previous clinic note from 5/7/2021 for further detailed history. Of note patient reports that she does have a history of thoracic outlet syndrome on this right side which was not mentioned at last clinic visit. She states it does seem to be controlled and she is no longer seeing the cardiothoracic specialist that was treating her for this. She states this pain that she is currently having is much different than when she had pain related to her thoracic outlet syndrome. On last examination patient reported right shoulder pain I was questioning of possible rotator cuff impingement. She did elect to pursue a corticosteroid injection at that time however patient is revealed that she recently underwent a Covid vaccine I recommended waiting until today for possible injection. She returns today continue to note right shoulder pain hoping to undergo that injection. Patient continues to be most severe over the posterior shoulder is aggravated by any overhead motion or heavy lifting on this right side. Again of note patient does have history of degenerative changes to the cervical spine demonstrated on recent MRI which may be contributing to some of the shoulder pain. .       Review of Systems   Constitutional: Negative for fever, chills, sweats, recent illness, or recent injury. Neurological: Negative for headaches, numbness, or weakness. Integumentary: Negative for rash, itching, ecchymosis, abrasions, or laceration. Musculoskeletal: Positive for Follow-up (Right shoulder injection )       Physical Exam:  Constitutional: Patient is oriented to person, place, and time. Patient appears well-developed and well nourished. Musculoskeletal:    Shoulder:     Skin: warm and dry, no rash or erythema; no swelling or obvious muscular atrophy  Vasculature: 2+ radial pulses bilaterally  Neuro: Sensation grossly intact to light touch diffusely  Tenderness: Mild tenderness to the right acromion process.   No tenderness to the lateral deltoid, AC joint or bicipital groove.     ROM: (Degrees)     Right                           A          P                                       Elevation                     150      170                                    Abduction                    130      160                                ER                               70        80                                       IR                                 L1                                                    90 abd/ER                                                                                                  90 abd/IR                                                                                               Crepitation                   No                                            Dyskenesia                 No                                                    Muscle strength:     Right                                                                                Deltoid                         5                                                Supraspinatus             4                                               ER                               5                                               IR                                 5    Special tests     Right                           Rotator Cuff                                            y                                  Painful arc                                                     n                                  Pain with ER                                                               y                                  Neer's                                                                          y                                  Hawkin's                                                                      n                                  Drop Arm                                               n                                  Lift off/Belly Press                                 y                                  ER Lag                                                                                                                            TRISTAR [facility] Joint  n                                  AC tenderness                                      n                                  Cross-chest adduction                           Neurological: Patient is alert and oriented to person, place, and time. Normal strenght. No sensory deficit. Skin: Skin is warm and dry  Psychiatric: Behavior is normal. Thought content normal.  Nursing note and vitals reviewed. Labs and Imaging:     XR taken today:  No results found. Assessment and Plan:  1. Right rotator cuff tendinitis    2. Cervical spine pain    3. Thoracic outlet syndrome              PLAN:  Shaquille Reyna is a 53 y.o. old female with right shoulder pain that is consistent with rotator cuff tendinitis. Patient also with history of cervical DDD and thoracic outlet syndrome on the same side. I do believe the rotator cuff is a main pain generator for her do recommend pursuing conservative management at this time. We discussed treatment options available to her, including nonoperative and operative intervention. At this time I believe she will benefit from conservative management. We also discussed use of a prescription NSAID versus a cortisone injection and at this time she has elected to proceed with     a cortisone injection which was administered as outlined below. Patient is educated that should this not provide significant relief of her pain she would benefit from further diagnostic evaluation with an EMG of this right upper extremity to evaluate this thoracic outlet syndrome. Depending what that shows we can either direct her to a cardiothoracic that she has seen in the past or Dr. Melanie Tran for further evaluation of the cervical spine DDD. Procedure: right shoulder subacromial space injection  Following an appropriate discussion with the patient regarding the risks and benefits of the procedure she consented to proceed. her right shoulder was prepped using chlorhexadine solution. Using aseptic technique and through a posterior approach, her right shoulder subacromial space was injected with a 3 cc mixture of 1cc 40mg/ml kenalog and 2 cc of 1% lidocaine without epinephrine. A band aid was applied to the injection site. she tolerated the injection with no immediate adverse reactions.     Administrations This Visit     lidocaine 1 % injection 2 mL     Admin Date  05/14/2021  13:41 Action  Given Dose  2 mL Route  Intra-articular Site  Shoulder Right Administered By  Devang Martell MA    Ordering Provider: Melinda García [facility]: [id number]    Lot#: [id number]    : HOSPIRA    Patient Supplied?: No          triamcinolone acetonide (KENALOG-40) injection 40 mg     Admin Date  05/14/2021  13:41 Action  Given Dose  40 mg Route  Intra-articular Site  Shoulder Right Administered By  Devang Martell MA    Ordering Provider: PRISCA Sims    [facility]: 0916-3599-34    Lot#: [id number]    : [employer] (PRIMARY CARE)    Patient Supplied?: No                  Please note that this chart was generated using voice recognition Dragon dictation software. Although every effort was made to ensure the accuracy of this automated transcription, some errors in transcription may have occurred.

## 2021-05-27 ENCOUNTER — HOSPITAL ENCOUNTER (OUTPATIENT)
Facility: MEDICAL CENTER | Age: 51
End: 2021-05-27

## 2021-06-01 ENCOUNTER — HOSPITAL ENCOUNTER (OUTPATIENT)
Dept: CT IMAGING | Facility: CLINIC | Age: 51
Discharge: HOME | End: 2021-06-03
Payer: COMMERCIAL

## 2021-06-01 ENCOUNTER — HOSPITAL ENCOUNTER (OUTPATIENT)
Facility: CLINIC | Age: 51
Discharge: HOME | End: 2021-06-01
Payer: COMMERCIAL

## 2021-06-01 DIAGNOSIS — R16.1 SPLENOMEGALY: ICD-10-CM

## 2021-06-01 DIAGNOSIS — K74.69 OTHER CIRRHOSIS OF LIVER (HCC): ICD-10-CM

## 2021-06-01 LAB
ALBUMIN SERPL-MCNC: 3.3 G/DL (ref 3.5–5.2)
ALBUMIN/GLOBULIN RATIO: 1.6 (ref 1–2.5)
ALP BLD-CCNC: 54 U/L (ref 35–104)
ALT SERPL-CCNC: 13 U/L (ref 5–33)
ANION GAP SERPL CALCULATED.3IONS-SCNC: 10 MMOL/L (ref 9–17)
AST SERPL-CCNC: 10 U/L
BILIRUB SERPL-MCNC: 0.5 MG/DL (ref 0.3–1.2)
BILIRUBIN DIRECT: 0.12 MG/DL
BILIRUBIN, INDIRECT: 0.38 MG/DL (ref 0–1)
BUN BLDV-MCNC: 11 MG/DL (ref 6–20)
BUN/CREAT BLD: ABNORMAL (ref 9–20)
CALCIUM SERPL-MCNC: 7.1 MG/DL (ref 8.6–10.4)
CHLORIDE BLD-SCNC: 113 MMOL/L (ref 98–107)
CO2: 20 MMOL/L (ref 20–31)
CREAT SERPL-MCNC: 0.5 MG/DL (ref 0.5–0.9)
GFR AFRICAN AMERICAN: >60 ML/MIN
GFR NON-AFRICAN AMERICAN: >60 ML/MIN
GFR SERPL CREATININE-BSD FRML MDRD: ABNORMAL ML/MIN/{1.73_M2}
GFR SERPL CREATININE-BSD FRML MDRD: ABNORMAL ML/MIN/{1.73_M2}
GLOBULIN: ABNORMAL G/DL (ref 1.5–3.8)
GLUCOSE BLD-MCNC: 180 MG/DL (ref 70–99)
HCT VFR BLD CALC: 39.4 % (ref 36.3–47.1)
HEMOGLOBIN: 13.1 G/DL (ref 11.9–15.1)
INR BLD: 1
MCH RBC QN AUTO: 28.9 PG (ref 25.2–33.5)
MCHC RBC AUTO-ENTMCNC: 33.2 G/DL (ref 28.4–34.8)
MCV RBC AUTO: 87 FL (ref 82.6–102.9)
NRBC AUTOMATED: 0 PER 100 WBC
PDW BLD-RTO: 13.4 % (ref 11.8–14.4)
PLATELET # BLD: 153 K/UL (ref 138–453)
PMV BLD AUTO: 11.4 FL (ref 8.1–13.5)
POTASSIUM SERPL-SCNC: 3.1 MMOL/L (ref 3.7–5.3)
PROTHROMBIN TIME: 10.5 SEC (ref 9.4–12.6)
RBC # BLD: 4.53 M/UL (ref 3.95–5.11)
SODIUM BLD-SCNC: 143 MMOL/L (ref 135–144)
TOTAL PROTEIN: 5.4 G/DL (ref 6.4–8.3)
WBC # BLD: 8.3 K/UL (ref 3.5–11.3)

## 2021-06-01 PROCEDURE — 74177 CT ABD & PELVIS W/CONTRAST: CPT

## 2021-06-01 PROCEDURE — 80076 HEPATIC FUNCTION PANEL: CPT

## 2021-06-01 PROCEDURE — 80048 BASIC METABOLIC PNL TOTAL CA: CPT

## 2021-06-01 PROCEDURE — 85027 COMPLETE CBC AUTOMATED: CPT

## 2021-06-01 PROCEDURE — 2580000003 HC RX 258: Performed by: INTERNAL MEDICINE

## 2021-06-01 PROCEDURE — 36415 COLL VENOUS BLD VENIPUNCTURE: CPT

## 2021-06-01 PROCEDURE — 6360000004 HC RX CONTRAST MEDICATION: Performed by: INTERNAL MEDICINE

## 2021-06-01 PROCEDURE — 85610 PROTHROMBIN TIME: CPT

## 2021-06-01 RX ORDER — 0.9 % SODIUM CHLORIDE 0.9 %
80 INTRAVENOUS SOLUTION INTRAVENOUS ONCE
Status: COMPLETED | OUTPATIENT
Start: 2021-06-01 | End: 2021-06-01

## 2021-06-01 RX ORDER — SODIUM CHLORIDE 0.9 % (FLUSH) 0.9 %
10 SYRINGE (ML) INJECTION PRN
Status: DISCONTINUED | OUTPATIENT
Start: 2021-06-01 | End: 2021-06-04 | Stop reason: HOSPADM

## 2021-06-01 RX ADMIN — Medication 10 ML: at 11:55

## 2021-06-01 RX ADMIN — IOPAMIDOL 75 ML: 755 INJECTION, SOLUTION INTRAVENOUS at 11:54

## 2021-06-01 RX ADMIN — SODIUM CHLORIDE 80 ML: 9 INJECTION, SOLUTION INTRAVENOUS at 11:54

## 2021-06-03 ENCOUNTER — HOSPITAL ENCOUNTER (OUTPATIENT)
Facility: CLINIC | Age: 51
Discharge: HOME | End: 2021-06-03
Payer: COMMERCIAL

## 2021-06-03 DIAGNOSIS — K74.69 OTHER CIRRHOSIS OF LIVER (HCC): ICD-10-CM

## 2021-06-03 DIAGNOSIS — R16.1 SPLENOMEGALY: ICD-10-CM

## 2021-06-03 LAB
ABSOLUTE EOS #: 0.23 K/UL (ref 0–0.44)
ABSOLUTE IMMATURE GRANULOCYTE: 0.05 K/UL (ref 0–0.3)
ABSOLUTE LYMPH #: 2.35 K/UL (ref 1.1–3.7)
ABSOLUTE MONO #: 0.47 K/UL (ref 0.1–1.2)
AFP: 3 UG/L
ALBUMIN SERPL-MCNC: 4.4 G/DL (ref 3.5–5.2)
ALBUMIN/GLOBULIN RATIO: 1.8 (ref 1–2.5)
ALP BLD-CCNC: 71 U/L (ref 35–104)
ALT SERPL-CCNC: 21 U/L (ref 5–33)
ANION GAP SERPL CALCULATED.3IONS-SCNC: 11 MMOL/L (ref 9–17)
AST SERPL-CCNC: 12 U/L
BASOPHILS # BLD: 1 % (ref 0–2)
BASOPHILS ABSOLUTE: 0.07 K/UL (ref 0–0.2)
BILIRUB SERPL-MCNC: 0.42 MG/DL (ref 0.3–1.2)
BUN BLDV-MCNC: 10 MG/DL (ref 6–20)
BUN/CREAT BLD: ABNORMAL (ref 9–20)
CALCIUM SERPL-MCNC: 9.3 MG/DL (ref 8.6–10.4)
CHLORIDE BLD-SCNC: 103 MMOL/L (ref 98–107)
CO2: 24 MMOL/L (ref 20–31)
CREAT SERPL-MCNC: 0.74 MG/DL (ref 0.5–0.9)
DIFFERENTIAL TYPE: ABNORMAL
EOSINOPHILS RELATIVE PERCENT: 3 % (ref 1–4)
GFR AFRICAN AMERICAN: >60 ML/MIN
GFR NON-AFRICAN AMERICAN: >60 ML/MIN
GFR SERPL CREATININE-BSD FRML MDRD: ABNORMAL ML/MIN/{1.73_M2}
GFR SERPL CREATININE-BSD FRML MDRD: ABNORMAL ML/MIN/{1.73_M2}
GLUCOSE BLD-MCNC: 235 MG/DL (ref 70–99)
HCT VFR BLD CALC: 42.8 % (ref 36.3–47.1)
HEMOGLOBIN: 14.2 G/DL (ref 11.9–15.1)
IMMATURE GRANULOCYTES: 1 %
LYMPHOCYTES # BLD: 27 % (ref 24–43)
MCH RBC QN AUTO: 28.4 PG (ref 25.2–33.5)
MCHC RBC AUTO-ENTMCNC: 33.2 G/DL (ref 28.4–34.8)
MCV RBC AUTO: 85.6 FL (ref 82.6–102.9)
MONOCYTES # BLD: 5 % (ref 3–12)
NRBC AUTOMATED: 0 PER 100 WBC
PDW BLD-RTO: 13.5 % (ref 11.8–14.4)
PLATELET # BLD: 167 K/UL (ref 138–453)
PLATELET ESTIMATE: ABNORMAL
PMV BLD AUTO: 12.1 FL (ref 8.1–13.5)
POTASSIUM SERPL-SCNC: 4 MMOL/L (ref 3.7–5.3)
RBC # BLD: 5 M/UL (ref 3.95–5.11)
RBC # BLD: ABNORMAL 10*6/UL
SEG NEUTROPHILS: 63 % (ref 36–65)
SEGMENTED NEUTROPHILS ABSOLUTE COUNT: 5.55 K/UL (ref 1.5–8.1)
SODIUM BLD-SCNC: 138 MMOL/L (ref 135–144)
TOTAL PROTEIN: 6.9 G/DL (ref 6.4–8.3)
WBC # BLD: 8.7 K/UL (ref 3.5–11.3)
WBC # BLD: ABNORMAL 10*3/UL

## 2021-06-03 PROCEDURE — 80053 COMPREHEN METABOLIC PANEL: CPT

## 2021-06-03 PROCEDURE — 85025 COMPLETE CBC W/AUTO DIFF WBC: CPT

## 2021-06-03 PROCEDURE — 82105 ALPHA-FETOPROTEIN SERUM: CPT

## 2021-06-03 PROCEDURE — 36415 COLL VENOUS BLD VENIPUNCTURE: CPT

## 2021-06-04 ENCOUNTER — OFFICE VISIT (OUTPATIENT)
Dept: ONCOLOGY | Age: 51
End: 2021-06-04
Payer: COMMERCIAL

## 2021-06-04 ENCOUNTER — TELEPHONE (OUTPATIENT)
Dept: FAMILY MEDICINE CLINIC | Age: 51
End: 2021-06-04

## 2021-06-04 ENCOUNTER — TELEPHONE (OUTPATIENT)
Dept: ONCOLOGY | Age: 51
End: 2021-06-04

## 2021-06-04 VITALS
RESPIRATION RATE: 16 BRPM | HEART RATE: 76 BPM | SYSTOLIC BLOOD PRESSURE: 131 MMHG | DIASTOLIC BLOOD PRESSURE: 79 MMHG | TEMPERATURE: 97.5 F | WEIGHT: 159.1 LBS | BODY MASS INDEX: 28.18 KG/M2

## 2021-06-04 DIAGNOSIS — R16.1 SPLENOMEGALY: Primary | ICD-10-CM

## 2021-06-04 DIAGNOSIS — K74.69 OTHER CIRRHOSIS OF LIVER (HCC): ICD-10-CM

## 2021-06-04 PROCEDURE — 99213 OFFICE O/P EST LOW 20 MIN: CPT | Performed by: INTERNAL MEDICINE

## 2021-06-04 PROCEDURE — 99211 OFF/OP EST MAY X REQ PHY/QHP: CPT

## 2021-06-04 NOTE — TELEPHONE ENCOUNTER
PAULY ARRIVES AMBULATORY FOR MD VISIT  DR Ember Tyler IN TO SEE PATIENT  ORDERS RECEIVED  RV PRN  AVS PRINTED AND GIVEN TO PATIENT WITH INSTRUCTIONS  PATIENT DISCHARGED AMBULATORY

## 2021-06-04 NOTE — PROGRESS NOTES
DIAGNOSIS:   1. Splenomegaly with live cirrhosis   2. MOLINA    CURRENT THERAPY:  Plan for weight loss and repeat imaging     BRIEF CASE HISTORY:   Esme Loyola is a very pleasant 49 y.o. female who is referred to us for splenomegaly with liver cirrhosis. She recently has COVID testing and was negative. She had CT 2020 that showed splenomegaly and some liver changes. She was diagnosed with diabetes 5 years ago and was started on insulin and has since stopped. She has history of fungal infection in the lungs and was treated with steroids. She was found to have mass on adrenal gland that has been monitored, during the initial evaluation and notes she was informed of cirrhosis of the liver. Her imaging history shows slow enlargement of the spleen. She has consulted with GI locally and with [facility], it was felt to possibly be metabolic disorder. She has no history of significant alcohol consumption. She has been working to lose weight and has lost about 5 pounds, difficult with insulin resistance. INTERIM HISTORY: The patient presents for follow up today to review work up. She has received 2nd vaccination for COVID today. She has lost a little over 10 pounds intentionally. Her glucose remains poorly controlled. PAST MEDICAL HISTORY: has a past medical history of Anomalous coronary artery origin, Anxiety, Asthma, Back pain, DDD (degenerative disc disease), cervical, DDD (degenerative disc disease), lumbar, Diabetes mellitus ([address] Fatty liver, Mitral prolapse, Pneumonia, Thoracic outlet syndrome, and Thyroid goiter. PAST SURGICAL HISTORY: has a past surgical history that includes Tubal ligation;  section; Cervical spine surgery; hernia repair; Colonoscopy; Endoscopy, colon, diagnostic; bronchoscopy; Cardiac surgery; Tonsillectomy; pr sono guide needle biopsy; and Finger trigger release (Right, 10/9/2019).      CURRENT MEDICATIONS:  has a current medication list which includes the following prescription(s): ibuprofen, januvia, rosuvastatin, fluticasone, mometasone-formoterol, senna-docusate, hydrocortisone, ketoconazole, albuterol sulfate hfa, medroxyprogesterone, pantoprazole sodium, metoprolol succinate, ranolazine, truetest test, trueplus lancets 33g, albuterol, dexcom g5 mobile , dexcom g6 sensor, dexcom g5 mobile transmitter, ondansetron, insulin glargine, and nitroglycerin, and the following Facility-Administered Medications: nitroglycerin and sodium chloride. ALLERGIES:  is allergic to bactrim [sulfamethoxazole-trimethoprim], biaxin [clarithromycin], ceclor [cefaclor], and vancomycin. FAMILY HISTORY: Negative for any hematological or oncological conditions. SOCIAL HISTORY:  reports that she has been smoking cigarettes. She has a 10.00 pack-year smoking history. She has never used smokeless tobacco. She reports that she does not drink alcohol and does not use drugs. REVIEW OF SYSTEMS:   General: No fever or night sweats. Weight loss - intentional  ENT: No double or blurred vision, no tinnitus or hearing problem, no dysphagia or sore throat   Respiratory: No chest pain, no shortness of breath, no cough or hemoptysis. Cardiovascular: Denies chest pain, PND or orthopnea. No L E swelling or palpitations. Gastrointestinal: No nausea or vomiting, abdominal pain, diarrhea or constipation. Genitourinary: Denies dysuria, hematuria, frequency, urgency or incontinence. Neurological: Denies headaches, decreased LOC, no sensory or motor focal deficits. Musculoskeletal: No arthralgia no back pain or joint swelling. Skin: There are no rashes or bleeding. Psychiatric: No anxiety, no depression. Endocrine: No diabetes or thyroid disease. Hematologic: No bleeding, no adenopathy. PHYSICAL EXAM: Shows a well appearing 49y.o.-year-old female who is not in pain or distress.  Vital Signs: Blood pressure 131/79, pulse 76, temperature 97.5 °F (36.4 °C), temperature source Temporal, resp. rate 16, weight 159 lb 1.6 oz (72.2 kg), not currently breastfeeding. HEENT: Normocephalic and atraumatic. Pupils are equal, round, reactive to light and accommodation. Extraocular muscles are intact. Neck: Showed no JVD, no carotid bruit . Lungs: Clear to auscultation bilaterally. Heart: Regular without any murmur. Abdomen: Soft, nontender. No hepatosplenomegaly. Extremities: Lower extremities show no edema, clubbing, or cyanosis. Breasts: Examination not done today. Neuro exam: intact cranial nerves bilaterally no motor or sensory deficit, gait is normal. Lymphatic: no adenopathy appreciated in the supraclavicular, axillary, cervical or inguinal area    REVIEW OF LABORATORY DATA:   Lab Results   Component Value Date    WBC 8.7 06/03/2021    HGB 14.2 06/03/2021    HCT 42.8 06/03/2021    MCV 85.6 06/03/2021     06/03/2021       Chemistry        Component Value Date/Time     06/03/2021 1457    K 4.0 06/03/2021 1457     06/03/2021 1457    CO2 24 06/03/2021 1457    BUN 10 06/03/2021 1457    CREATININE 0.74 06/03/2021 1457        Component Value Date/Time    CALCIUM 9.3 06/03/2021 1457    ALKPHOS 71 06/03/2021 1457    AST 12 06/03/2021 1457    ALT 21 06/03/2021 1457    BILITOT 0.42 06/03/2021 1457            REVIEW OF RADIOLOGICAL RESULTS:   CT 11/10/2020  Impression    *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  No acute process is seen. *Cirrhotic liver with splenomegaly. CT 6/2021  Impression   *Cirrhotic liver with splenomegaly.  No liver mass identified on the single   phase technique.  Complete liver evaluation is needed, CT or MRI utilizing   liver mass protocol is recommended. IMPRESSION:   1. Splenomegaly with liver cirrhosis  2. MOLINA     PLAN:   1. We reviewed her imaging which showed stable findings. 2. Her lab work was reviewed and discussed, liver enzymes have improved. 3. She has lost weight and I encouraged her to continue towards 10 more pounds.    4. I discussed follow up plan, I do not recommend further imaging at his time. 5. Return as needed.

## 2021-06-30 ENCOUNTER — NURSE ONLY (OUTPATIENT)
Dept: FAMILY MEDICINE CLINIC | Age: 51
End: 2021-06-30
Payer: COMMERCIAL

## 2021-06-30 ENCOUNTER — TELEPHONE (OUTPATIENT)
Dept: FAMILY MEDICINE CLINIC | Age: 51
End: 2021-06-30

## 2021-06-30 ENCOUNTER — HOSPITAL ENCOUNTER (OUTPATIENT)
Age: 51
Setting detail: SPECIMEN
Discharge: HOME | End: 2021-06-30
Payer: COMMERCIAL

## 2021-06-30 DIAGNOSIS — K75.81 NASH (NONALCOHOLIC STEATOHEPATITIS): ICD-10-CM

## 2021-06-30 DIAGNOSIS — E11.9 TYPE 2 DIABETES MELLITUS WITHOUT COMPLICATION, WITHOUT LONG-TERM CURRENT USE OF INSULIN (HCC): Primary | ICD-10-CM

## 2021-06-30 DIAGNOSIS — G89.29 CHRONIC BILATERAL LOW BACK PAIN WITHOUT SCIATICA: ICD-10-CM

## 2021-06-30 DIAGNOSIS — R30.0 DYSURIA: Primary | ICD-10-CM

## 2021-06-30 DIAGNOSIS — R30.0 DYSURIA: ICD-10-CM

## 2021-06-30 DIAGNOSIS — M54.50 CHRONIC BILATERAL LOW BACK PAIN WITHOUT SCIATICA: ICD-10-CM

## 2021-06-30 LAB
BILIRUBIN, POC: NORMAL
BLOOD URINE, POC: NORMAL
CLARITY, POC: CLEAR
COLOR, POC: YELLOW
GLUCOSE URINE, POC: NORMAL
KETONES, POC: NORMAL
LEUKOCYTE EST, POC: NORMAL
NITRITE, POC: NORMAL
PH, POC: 5.5
PROTEIN, POC: NORMAL
SPECIFIC GRAVITY, POC: 1.02
UROBILINOGEN, POC: 0.2

## 2021-06-30 PROCEDURE — 81003 URINALYSIS AUTO W/O SCOPE: CPT | Performed by: FAMILY MEDICINE

## 2021-06-30 NOTE — TELEPHONE ENCOUNTER
Patient came in today for urine sample for possible UTI, urine was negative. Do you want me to send out for culture also? Patient also asked can you order labs to check kidney function just to make sure her kidney function is okay.

## 2021-06-30 NOTE — TELEPHONE ENCOUNTER
----- Message from He Churchill sent at 6/29/2021  4:51 PM EDT -----  Subject: Appointment Request    Reason for Call: Urgent Adult Urinary Problem    QUESTIONS  Type of Appointment? Established Patient  Reason for appointment request? No appointments available during search  Additional Information for Provider? Patient called in asking if the   doctor can order her a BVT test to check for a UTI. I told patient it will   take me to scheduling and when I got to scheduling there are no appts   available and no option to choose other providers. I suggested she go to   [facility] as it is telling me its important she is seen. Patient then   asked if I would please send over Urgent message to see if doctor can help   her with her symptoms. Thank You.   ---------------------------------------------------------------------------  --------------  Gurjit QUINTERO  What is the best way for the office to contact you? OK to leave message on   voicemail  Preferred Call Back Phone Number? [phone]  ---------------------------------------------------------------------------  --------------  SCRIPT ANSWERS  Relationship to Patient? Self  Appointment reason? Symptomatic  Select script based on patient symptoms? Adult Urinary Symptoms   [Urine-related, UTI, Bladder Infection]  Are you having severe back pain with your urinary symptoms? No  Are you having vomiting or nausea? Yes  Have you been diagnosed with, awaiting test results for, or told that you   are suspected of having COVID-19 (Coronavirus)? (If patient has tested   negative or was tested as a requirement for work, school, or travel and   not based on symptoms, answer no)? No  Do you currently have flu-like symptoms including fever or chills, cough,   shortness of breath, difficulty breathing, or new loss of taste or smell? No  Have you had close contact with someone with COVID-19 in the last 14 days?    No  (Service Expert  click yes below to proceed with [employer] As Usual   Scheduling)?  Yes

## 2021-06-30 NOTE — TELEPHONE ENCOUNTER
Patient called in asking if the   doctor can order her a BVT test to check for a UTI. **Called patient, no answer left message informing need to come into office to leave a urine sample. **

## 2021-07-01 ENCOUNTER — HOSPITAL ENCOUNTER (OUTPATIENT)
Facility: CLINIC | Age: 51
Discharge: HOME | End: 2021-07-01
Payer: COMMERCIAL

## 2021-07-01 LAB
ALBUMIN SERPL-MCNC: 4.1 G/DL (ref 3.5–5.2)
ALBUMIN/GLOBULIN RATIO: 1.6 (ref 1–2.5)
ALP BLD-CCNC: 57 U/L (ref 35–104)
ALT SERPL-CCNC: 14 U/L (ref 5–33)
ANION GAP SERPL CALCULATED.3IONS-SCNC: 13 MMOL/L (ref 9–17)
AST SERPL-CCNC: 14 U/L
BILIRUB SERPL-MCNC: 0.52 MG/DL (ref 0.3–1.2)
BUN BLDV-MCNC: 9 MG/DL (ref 6–20)
BUN/CREAT BLD: ABNORMAL (ref 9–20)
CALCIUM SERPL-MCNC: 9 MG/DL (ref 8.6–10.4)
CHLORIDE BLD-SCNC: 104 MMOL/L (ref 98–107)
CO2: 25 MMOL/L (ref 20–31)
CREAT SERPL-MCNC: 0.74 MG/DL (ref 0.5–0.9)
GFR AFRICAN AMERICAN: >60 ML/MIN
GFR NON-AFRICAN AMERICAN: >60 ML/MIN
GFR SERPL CREATININE-BSD FRML MDRD: ABNORMAL ML/MIN/{1.73_M2}
GFR SERPL CREATININE-BSD FRML MDRD: ABNORMAL ML/MIN/{1.73_M2}
GLUCOSE BLD-MCNC: 175 MG/DL (ref 70–99)
POTASSIUM SERPL-SCNC: 4 MMOL/L (ref 3.7–5.3)
SODIUM BLD-SCNC: 142 MMOL/L (ref 135–144)
THYROXINE, FREE: 0.97 NG/DL (ref 0.93–1.7)
TOTAL PROTEIN: 6.6 G/DL (ref 6.4–8.3)
TSH SERPL DL<=0.05 MIU/L-ACNC: 1.58 MIU/L (ref 0.3–5)

## 2021-07-01 PROCEDURE — 83036 HEMOGLOBIN GLYCOSYLATED A1C: CPT

## 2021-07-01 PROCEDURE — 84443 ASSAY THYROID STIM HORMONE: CPT

## 2021-07-01 PROCEDURE — 36415 COLL VENOUS BLD VENIPUNCTURE: CPT

## 2021-07-01 PROCEDURE — 84439 ASSAY OF FREE THYROXINE: CPT

## 2021-07-01 PROCEDURE — 80053 COMPREHEN METABOLIC PANEL: CPT

## 2021-07-02 LAB
CULTURE: NO GROWTH
ESTIMATED AVERAGE GLUCOSE: 206 MG/DL
HBA1C MFR BLD: 8.8 % (ref 4–6)
Lab: NORMAL
SPECIMEN DESCRIPTION: NORMAL

## 2021-08-09 ENCOUNTER — TELEPHONE (OUTPATIENT)
Dept: FAMILY MEDICINE CLINIC | Age: 51
End: 2021-08-09

## 2021-08-09 NOTE — TELEPHONE ENCOUNTER
Pt is having some really bad pain in her right side. She states it hurts when she breathe. She wants to know if you could call her if possible . She stated she went to the ED last night and they did a CT of her side. She states she is in a lot of pain still. I did schedule her to see you tomorrow.     Please advise

## 2021-08-10 ENCOUNTER — TELEPHONE (OUTPATIENT)
Dept: ONCOLOGY | Age: 51
End: 2021-08-10

## 2021-08-10 ENCOUNTER — OFFICE VISIT (OUTPATIENT)
Dept: FAMILY MEDICINE CLINIC | Age: 51
End: 2021-08-10
Payer: COMMERCIAL

## 2021-08-10 VITALS
BODY MASS INDEX: 29.48 KG/M2 | DIASTOLIC BLOOD PRESSURE: 84 MMHG | OXYGEN SATURATION: 98 % | WEIGHT: 166.4 LBS | SYSTOLIC BLOOD PRESSURE: 138 MMHG | TEMPERATURE: 97.4 F | HEART RATE: 72 BPM

## 2021-08-10 DIAGNOSIS — R09.1 PLEURISY: Primary | ICD-10-CM

## 2021-08-10 PROCEDURE — 99213 OFFICE O/P EST LOW 20 MIN: CPT | Performed by: FAMILY MEDICINE

## 2021-08-10 RX ORDER — IBUPROFEN 600 MG/1
600 TABLET ORAL 3 TIMES DAILY PRN
Qty: 30 TABLET | Refills: 0 | Status: SHIPPED | OUTPATIENT
Start: 2021-08-10 | End: 2022-02-18

## 2021-08-10 RX ORDER — CYCLOBENZAPRINE HCL 5 MG
5 TABLET ORAL 2 TIMES DAILY PRN
Qty: 20 TABLET | Refills: 0 | Status: SHIPPED | OUTPATIENT
Start: 2021-08-10 | End: 2022-02-28 | Stop reason: SDUPTHER

## 2021-08-10 ASSESSMENT — PATIENT HEALTH QUESTIONNAIRE - PHQ9
SUM OF ALL RESPONSES TO PHQ QUESTIONS 1-9: 0
SUM OF ALL RESPONSES TO PHQ9 QUESTIONS 1 & 2: 0
2. FEELING DOWN, DEPRESSED OR HOPELESS: 0
SUM OF ALL RESPONSES TO PHQ QUESTIONS 1-9: 0
1. LITTLE INTEREST OR PLEASURE IN DOING THINGS: 0
SUM OF ALL RESPONSES TO PHQ QUESTIONS 1-9: 0

## 2021-08-10 NOTE — TELEPHONE ENCOUNTER
RECEIVED FAXED REFERRAL FOR PT. PT IS AN EXISTING PT OF DR Kee Hall. WRITER CALLED PT TO SCHEDULE AN APPOINTMENT NO ANSWER LEFT VM.

## 2021-08-10 NOTE — PROGRESS NOTES
General FM note    Rod Sethi is a 49 y.o. female who presents today for follow up on her  medical conditions as noted below. Rod Sethi is c/o of   Chief Complaint   Patient presents with    Flank Pain     Rt side, ED f/u       Patient Active Problem List:     Fatigue     GERD (gastroesophageal reflux disease)     Obesity (BMI 30-39. 9)     Diabetes mellitus ([facility] [address]     Snoring     Elevated liver enzymes     Unstable angina (HCC)     Positive cardiac stress test     Anomalous coronary artery origin     Trigger thumb of right hand     Past Medical History:   Diagnosis Date    Anomalous coronary artery origin 2016    Anxiety     Asthma     Back pain     DDD (degenerative disc disease), cervical     DDD (degenerative disc disease), lumbar     Diabetes mellitus ([facility] [address]     Fatty liver     Mitral prolapse     Pneumonia     Thoracic outlet syndrome     Thyroid goiter     had biopsy      Past Surgical History:   Procedure Laterality Date    BRONCHOSCOPY      CARDIAC SURGERY      x 1    CERVICAL SPINE SURGERY      plates placed     SECTION      x 2    COLONOSCOPY      ENDOSCOPY, COLON, DIAGNOSTIC      EGD    FINGER TRIGGER RELEASE Right 10/9/2019    THUMB TRIGGER RELEASE performed by Codi Champion MD at [facility] [address]      abdomen    SC SONO GUIDE NEEDLE BIOPSY      TONSILLECTOMY      TUBAL LIGATION       Family History   Problem Relation Age of Onset    Diabetes Mother     Diabetes Paternal Grandmother      Current Outpatient Medications   Medication Sig Dispense Refill    cyclobenzaprine (FLEXERIL) 5 MG tablet Take 1 tablet by mouth 2 times daily as needed for Muscle spasms 20 tablet 0    ibuprofen (ADVIL;MOTRIN) 600 MG tablet Take 1 tablet by mouth 3 times daily as needed for Pain 30 tablet 0    ibuprofen (ADVIL;MOTRIN) 800 MG tablet Take 1 tablet by mouth every 8 hours as needed for Pain 30 tablet 1    JANUVIA 100 MG tablet TAKE 1 TABLET BY MOUTH EVERY DAY  rosuvastatin (CRESTOR) 10 MG tablet TAKE 1 TABLET BY MOUTH EVERY DAY      fluticasone (FLONASE ALLERGY RELIEF) 50 MCG/ACT nasal spray 1 spray in each nostril      mometasone-formoterol (DULERA) 100-5 MCG/ACT inhaler Inhale 2 puffs into the lungs 2 times daily Rinse mouth after using.  1 Inhaler 2    senna-docusate (PERICOLACE) 8.6-50 MG per tablet TAKE TWO TABLETS BY MOUTH DAILY AS NEEDED FOR CONSTIPATION 30 tablet 0    hydrocortisone 2.5 % cream Apply once daily to corners of mouth 60 g 5    ketoconazole (NIZORAL) 2 % cream Apply once daily to corners of mouth 30 g 2    albuterol sulfate HFA (VENTOLIN HFA) 108 (90 Base) MCG/ACT inhaler Inhale 2 puffs into the lungs every 6 hours as needed for Wheezing 1 Inhaler 3    medroxyPROGESTERone (PROVERA) 10 MG tablet Take 10 mg by mouth as needed       pantoprazole sodium (PROTONIX) 40 MG PACK packet Take 40 mg by mouth daily      metoprolol succinate (TOPROL XL) 25 MG extended release tablet Take 2 tablets by mouth daily (Patient taking differently: Take 50 mg by mouth daily 50 mg in the morning, 25 mg at night) 60 tablet 3    ranolazine (RANEXA) 1000 MG extended release tablet Take 1,000 mg by mouth      TRUETEST TEST strip   0    TRUEPLUS LANCETS 33G MISC   0    albuterol (PROVENTIL) (2.5 MG/3ML) 0.083% nebulizer solution Take 3 mLs by nebulization every 6 hours as needed for Wheezing 120 each 3    Continuous Blood Gluc  (DEXCOM G5 MOBILE ) KOURTNEY 1 each by Does not apply route daily (Patient not taking: Reported on 6/4/2021) 1 Device 0    Continuous Blood Gluc Sensor (DEXCOM G6 SENSOR) MISC 1 each by Does not apply route once a week (Patient not taking: Reported on 6/4/2021) 3 each 3    Continuous Blood Gluc Transmit (DEXCOM G5 MOBILE TRANSMITTER) MISC 1 each by Does not apply route once a week (Patient not taking: Reported on 6/4/2021) 1 each 0    ondansetron (ZOFRAN) 4 MG tablet Take 1 tablet by mouth daily as needed for Nausea or Vomiting (Patient not taking: Reported on 6/4/2021) 20 tablet 0    insulin glargine (TOUJEO SOLOSTAR) 300 UNIT/ML injection pen Inject 23 Units into the skin daily  (Patient not taking: Reported on 6/4/2021)      nitroGLYCERIN (NITROSTAT) 0.4 MG SL tablet Place 0.4 mg under the tongue every 5 minutes as needed for Chest pain Dissolve 1 tab under tongue at first sign of chest pain. May repeat every 5 minutes until relief is obtained. If pain persists after taking 3 tabs in a 15-minute period, or the pain is different than is typically experienced, call 9-1-1 immediately. (Patient not taking: Reported on 6/4/2021)       No current facility-administered medications for this visit. Facility-Administered Medications Ordered in Other Visits   Medication Dose Route Frequency Provider Last Rate Last Admin    nitroGLYCERIN (NITROSTAT) SL tablet 0.4 mg  0.4 mg Sublingual Q5 Min PRN Trent Callaway MD   0.4 mg at 09/16/16 0857    0.9 % sodium chloride infusion   Intravenous Continuous Trent Callaway MD 20 mL/hr at 09/16/16 0857 New Bag at 09/16/16 0857     ALLERGIES:    Allergies   Allergen Reactions    Bactrim [Sulfamethoxazole-Trimethoprim]     Biaxin [Clarithromycin]     Ceclor [Cefaclor]     Vancomycin      Itching, burning       Social History     Tobacco Use    Smoking status: Current Every Day Smoker     Packs/day: 0.50     Years: 20.00     Pack years: 10.00     Types: Cigarettes    Smokeless tobacco: Never Used   Substance Use Topics    Alcohol use: No     Alcohol/week: 0.0 standard drinks      Body mass index is 29.48 kg/m². /84   Pulse 72   Temp 97.4 °F (36.3 °C)   Wt 166 lb 6.4 oz (75.5 kg)   SpO2 98%   BMI 29.48 kg/m²     Subjective:      HPI    49 y.o. female here for Fu after Ed visit 08/08/2021 bc of R sided pain. The patient did have a CT done of her abdomen. There was still fibrosis liver present which she had before. No other changes there.   She also did have a elevated lipase. Patient states that she actually has the pain radiating from her right mid back area to her right flank area. This pain is there when she moves around when she takes a deep breath. And she states the pain at times was so severe that she just had to pull TR to make sure that something. She also was seen by her rheumatologist who is thinking that she might have a bleeding disorder so she needs to see her hematologist back. Review of Systems   Constitutional: Negative for fever and unexpected weight change. Pertinent items are noted in HPI. Objective:   Physical Exam  Constitutional: VS (see above). General appearance: normal development, habitus and attention, no deformities. No distress. Eyes: normal conjunctiva and lids. CAV: RRR, no RMG. No edema lower extremities. Pulmo: CTA bilateral, no CWR. Skin: no rashes, lesions or ulcers. Musculoskeletal: normal gait. Nails: no clubbing or cyanosis. Physical Exam  Musculoskeletal:        Arms:       Comments: Discomfort. Psychiatric: alert and oriented to place, time and person. Normal mood and affect. Assessment:       Diagnosis Orders   1. Pleurisy  cyclobenzaprine (FLEXERIL) 5 MG tablet    ibuprofen (ADVIL;MOTRIN) 600 MG tablet       Plan:   Patient will continue to follow-up with specialist.  I feel this is mostly soft tissue. No follow-ups on file. No orders of the defined types were placed in this encounter. Orders Placed This Encounter   Medications    cyclobenzaprine (FLEXERIL) 5 MG tablet     Sig: Take 1 tablet by mouth 2 times daily as needed for Muscle spasms     Dispense:  20 tablet     Refill:  0    ibuprofen (ADVIL;MOTRIN) 600 MG tablet     Sig: Take 1 tablet by mouth 3 times daily as needed for Pain     Dispense:  30 tablet     Refill:  0       Call or return to clinic prn if these symptoms worsen or fail to improve as anticipated.   I have reviewed the instructions with the patient, answering all questions to patient's satisfaction. Lenora received counseling on the following healthy behaviors: nutrition, exercise, and medication adherence  Reviewed prior labs and health maintenance. Continue current medications, diet and exercise. Discussed use, benefit, and side effects of prescribed medications. Barriers to medication compliance addressed. Patient given educational materials - see patient instructions. All patient questions answered. Patient voiced understanding.       Electronically signed by Mabel Thornton MD on 8/10/2021 at 12:54 PM       (Please note that portions of this note were completed with a voice recognition program. Efforts were made to edit the dictations but occasionally words are mis-transcribed.)

## 2021-08-12 ENCOUNTER — HOSPITAL ENCOUNTER (OUTPATIENT)
Facility: MEDICAL CENTER | Age: 51
End: 2021-08-12

## 2021-08-13 ENCOUNTER — TELEPHONE (OUTPATIENT)
Dept: ONCOLOGY | Age: 51
End: 2021-08-13

## 2021-08-13 ENCOUNTER — OFFICE VISIT (OUTPATIENT)
Dept: ONCOLOGY | Age: 51
End: 2021-08-13
Payer: COMMERCIAL

## 2021-08-13 ENCOUNTER — TELEPHONE (OUTPATIENT)
Dept: FAMILY MEDICINE CLINIC | Age: 51
End: 2021-08-13

## 2021-08-13 VITALS
HEART RATE: 81 BPM | BODY MASS INDEX: 28.52 KG/M2 | TEMPERATURE: 96.7 F | SYSTOLIC BLOOD PRESSURE: 158 MMHG | WEIGHT: 161 LBS | DIASTOLIC BLOOD PRESSURE: 93 MMHG

## 2021-08-13 DIAGNOSIS — R16.1 SPLENOMEGALY: Primary | ICD-10-CM

## 2021-08-13 DIAGNOSIS — K74.69 OTHER CIRRHOSIS OF LIVER (HCC): ICD-10-CM

## 2021-08-13 PROCEDURE — 99211 OFF/OP EST MAY X REQ PHY/QHP: CPT

## 2021-08-13 PROCEDURE — 99215 OFFICE O/P EST HI 40 MIN: CPT | Performed by: INTERNAL MEDICINE

## 2021-08-13 NOTE — PROGRESS NOTES
DIAGNOSIS:   1. Splenomegaly with live cirrhosis   2. MOLINA  3. Positive anticardiolipin antibody and antibeta glycoprotein antibody but no evidence of thromboembolism    CURRENT THERAPY:  Plan for weight loss and repeat imaging     BRIEF CASE HISTORY:   Ya Patterson is a very pleasant 49 y.o. female who is referred to us for splenomegaly with liver cirrhosis. She recently has COVID testing and was negative. She had CT 11/2020 that showed splenomegaly and some liver changes. She was diagnosed with diabetes 5 years ago and was started on insulin and has since stopped. She has history of fungal infection in the lungs and was treated with steroids. She was found to have mass on adrenal gland that has been monitored, during the initial evaluation and notes she was informed of cirrhosis of the liver. Her imaging history shows slow enlargement of the spleen. She has consulted with GI locally and with [facility], it was felt to possibly be metabolic disorder. She has no history of significant alcohol consumption. She has been working to lose weight and has lost about 5 pounds, difficult with insulin resistance. The patient was seen by rheumatology and she has HLA-B27 testing positive. She also had further work-up that showed positive anti beta 2 glycoprotein antibody and positive for anticardiolipin IgM. The patient never had any thromboembolism. INTERIM HISTORY: The patient presents for follow up today to review work up. As discussed above, she had a positive anticardiolipin and antiglycoprotein antibody. Lupus anticoagulant has been negative. She never had any thromboembolism. She is very overwhelmed about the diagnosis. She continues to follow-up with rheumatology and gastroenterology as well as liver specialist at [facility].     PAST MEDICAL HISTORY: has a past medical history of Anomalous coronary artery origin, Anxiety, Asthma, Back pain, DDD (degenerative disc disease), cervical, DDD (degenerative disc disease), lumbar, Diabetes mellitus ([facility] [address] Fatty liver, Mitral prolapse, Pneumonia, Thoracic outlet syndrome, and Thyroid goiter. PAST SURGICAL HISTORY: has a past surgical history that includes Tubal ligation;  section; Cervical spine surgery; hernia repair; Colonoscopy; Endoscopy, colon, diagnostic; bronchoscopy; Cardiac surgery; Tonsillectomy; pr sono guide needle biopsy; and Finger trigger release (Right, 10/9/2019). CURRENT MEDICATIONS:  has a current medication list which includes the following prescription(s): cyclobenzaprine, januvia, rosuvastatin, fluticasone, hydrocortisone, ketoconazole, albuterol sulfate hfa, medroxyprogesterone, metoprolol succinate, ranolazine, truetest test, trueplus lancets 33g, albuterol, ibuprofen, dexcom g5 mobile , dexcom g6 sensor, dexcom g5 mobile transmitter, ibuprofen, mometasone-formoterol, pantoprazole sodium, insulin glargine, and nitroglycerin, and the following Facility-Administered Medications: nitroglycerin and sodium chloride. ALLERGIES:  is allergic to bactrim [sulfamethoxazole-trimethoprim], biaxin [clarithromycin], ceclor [cefaclor], and vancomycin. FAMILY HISTORY: Negative for any hematological or oncological conditions. SOCIAL HISTORY:  reports that she has been smoking cigarettes. She has a 10.00 pack-year smoking history. She has never used smokeless tobacco. She reports that she does not drink alcohol and does not use drugs. REVIEW OF SYSTEMS:   General: No fever or night sweats. Weight loss - intentional  ENT: No double or blurred vision, no tinnitus or hearing problem, no dysphagia or sore throat   Respiratory: No chest pain, no shortness of breath, no cough or hemoptysis. Cardiovascular: Denies chest pain, PND or orthopnea. No L E swelling or palpitations. Gastrointestinal: No nausea or vomiting, abdominal pain, diarrhea or constipation.    Genitourinary: Denies dysuria, hematuria, frequency, urgency or incontinence. Neurological: Denies headaches, decreased LOC, no sensory or motor focal deficits. Musculoskeletal: No arthralgia no back pain or joint swelling. Skin: There are no rashes or bleeding. Psychiatric: No anxiety, no depression. Endocrine: No diabetes or thyroid disease. Hematologic: No bleeding, no adenopathy. PHYSICAL EXAM: Shows a well appearing 49y.o.-year-old female who is not in pain or distress. Vital Signs: Blood pressure (!) 158/93, pulse 81, temperature 96.7 °F (35.9 °C), temperature source Temporal, weight 161 lb (73 kg), not currently breastfeeding. HEENT: Normocephalic and atraumatic. Pupils are equal, round, reactive to light and accommodation. Extraocular muscles are intact. Neck: Showed no JVD, no carotid bruit . Lungs: Clear to auscultation bilaterally. Heart: Regular without any murmur. Abdomen: Soft, nontender. No hepatosplenomegaly. Extremities: Lower extremities show no edema, clubbing, or cyanosis. Breasts: Examination not done today. Neuro exam: intact cranial nerves bilaterally no motor or sensory deficit, gait is normal. Lymphatic: no adenopathy appreciated in the supraclavicular, axillary, cervical or inguinal area    REVIEW OF LABORATORY DATA:   Lab Results   Component Value Date    WBC 8.7 06/03/2021    HGB 14.2 06/03/2021    HCT 42.8 06/03/2021    MCV 85.6 06/03/2021     06/03/2021       Chemistry        Component Value Date/Time     07/01/2021 1515    K 4.0 07/01/2021 1515     07/01/2021 1515    CO2 25 07/01/2021 1515    BUN 9 07/01/2021 1515    CREATININE 0.74 07/01/2021 1515        Component Value Date/Time    CALCIUM 9.0 07/01/2021 1515    ALKPHOS 57 07/01/2021 1515    AST 14 07/01/2021 1515    ALT 14 07/01/2021 1515    BILITOT 0.52 07/01/2021 1515            REVIEW OF RADIOLOGICAL RESULTS:   CT 11/10/2020  Impression    *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  No acute process is seen. *Cirrhotic liver with splenomegaly.       CT 6/2021  Impression   *Cirrhotic liver with splenomegaly.  No liver mass identified on the single   phase technique.  Complete liver evaluation is needed, CT or MRI utilizing   liver mass protocol is recommended. IMPRESSION:   1. Splenomegaly with liver cirrhosis  2. MOLINA   3. 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 No clinical thromboembolism. PLAN:   1. I had a long discussion with the patient, explaining the findings. We went over her labs. 2. I think the positive antibody are part of her autoimmune condition. In absence of clinical thromboembolism, diagnosis of antiphospholipid syndrome cannot be made. 3. The patient is concerned about prophylaxis or protection against it. .  We talked about using low-dose aspirin. 4.  We will also do a functional study with lupus anticoagulant and DRV VT.  5. With those positive antibody, if the patient develops thromboembolism, she will need lifelong anticoagulation and she understands.   6. We will see her back in 6 months for follow-up

## 2021-08-13 NOTE — TELEPHONE ENCOUNTER
The patient does not have any antibodies against hepatitis B or A. This was checked in April of this year. The patient needs to have a series hep A vaccine/2 shots 6-12 months apart and also on the hep B vaccine series/3 shots - 0,1,6 months apart. The patient could come to us or go to the health department to get these vaccines. Thank you.

## 2021-08-13 NOTE — TELEPHONE ENCOUNTER
---Patient calling to get an order for a series of   3 shots for Hepatitis. Patient is not for sure what the lab test is called   . She wants to know if she has antibodies against Hepatitis.  Please call   to advise   ---------------------------------------------------------------------------

## 2021-08-16 ENCOUNTER — NURSE ONLY (OUTPATIENT)
Dept: FAMILY MEDICINE CLINIC | Age: 51
End: 2021-08-16
Payer: COMMERCIAL

## 2021-08-16 DIAGNOSIS — Z23 NEED FOR HEPATITIS B BOOSTER VACCINATION: Primary | ICD-10-CM

## 2021-08-16 PROCEDURE — 90746 HEPB VACCINE 3 DOSE ADULT IM: CPT | Performed by: FAMILY MEDICINE

## 2021-08-16 PROCEDURE — 90471 IMMUNIZATION ADMIN: CPT | Performed by: FAMILY MEDICINE

## 2021-09-15 ENCOUNTER — HOSPITAL ENCOUNTER (OUTPATIENT)
Dept: ULTRASOUND IMAGING | Facility: CLINIC | Age: 51
Discharge: HOME | End: 2021-09-17
Payer: COMMERCIAL

## 2021-09-15 ENCOUNTER — HOSPITAL ENCOUNTER (OUTPATIENT)
Facility: CLINIC | Age: 51
Discharge: HOME | End: 2021-09-15
Payer: COMMERCIAL

## 2021-09-15 DIAGNOSIS — E04.9 GOITER: ICD-10-CM

## 2021-09-15 LAB
IRON SATURATION: 31 % (ref 20–55)
IRON: 81 UG/DL (ref 37–145)
TOTAL IRON BINDING CAPACITY: 263 UG/DL (ref 250–450)
UNSATURATED IRON BINDING CAPACITY: 182 UG/DL (ref 112–347)

## 2021-09-15 PROCEDURE — 83540 ASSAY OF IRON: CPT

## 2021-09-15 PROCEDURE — 36415 COLL VENOUS BLD VENIPUNCTURE: CPT

## 2021-09-15 PROCEDURE — 83520 IMMUNOASSAY QUANT NOS NONAB: CPT

## 2021-09-15 PROCEDURE — 83550 IRON BINDING TEST: CPT

## 2021-09-15 PROCEDURE — 83516 IMMUNOASSAY NONANTIBODY: CPT

## 2021-09-16 ENCOUNTER — NURSE ONLY (OUTPATIENT)
Dept: FAMILY MEDICINE CLINIC | Age: 51
End: 2021-09-16
Payer: COMMERCIAL

## 2021-09-16 DIAGNOSIS — Z23 NEED FOR HEPATITIS B BOOSTER VACCINATION: Primary | ICD-10-CM

## 2021-09-16 LAB — MITOCHONDRIAL ANTIBODY: <0.5 U/ML (ref 0–4)

## 2021-09-16 PROCEDURE — 90471 IMMUNIZATION ADMIN: CPT | Performed by: FAMILY MEDICINE

## 2021-09-16 PROCEDURE — 90746 HEPB VACCINE 3 DOSE ADULT IM: CPT | Performed by: FAMILY MEDICINE

## 2021-09-18 LAB — SMOOTH MUSCLE ANTIBODY: 16 UNITS (ref 0–19)

## 2021-09-20 ENCOUNTER — TELEPHONE (OUTPATIENT)
Dept: FAMILY MEDICINE CLINIC | Age: 51
End: 2021-09-20

## 2021-09-20 NOTE — TELEPHONE ENCOUNTER
Patient states she has been experiencing symptoms off and on for 3-4 weeks. States that she had a covid test done last week that was negative. States she has symptoms of ear ache on the right side, headache and drainage. Please advise.  Thank you

## 2021-09-22 LAB
MISCELLANEOUS LAB TEST RESULT: NORMAL
TEST NAME: NORMAL

## 2021-09-22 RX ORDER — DOXYCYCLINE HYCLATE 100 MG
100 TABLET ORAL 2 TIMES DAILY
Qty: 14 TABLET | Refills: 0 | Status: SHIPPED | OUTPATIENT
Start: 2021-09-22 | End: 2021-09-29

## 2021-10-13 ENCOUNTER — HOSPITAL ENCOUNTER (OUTPATIENT)
Facility: CLINIC | Age: 51
Discharge: HOME | End: 2021-10-13
Payer: COMMERCIAL

## 2021-10-13 LAB
ALBUMIN SERPL-MCNC: 4.4 G/DL (ref 3.5–5.2)
ALBUMIN SERPL-MCNC: 4.5 G/DL (ref 3.5–5.2)
ALBUMIN/GLOBULIN RATIO: 1.6 (ref 1–2.5)
ALBUMIN/GLOBULIN RATIO: 1.6 (ref 1–2.5)
ALP BLD-CCNC: 64 U/L (ref 35–104)
ALP BLD-CCNC: 65 U/L (ref 35–104)
ALT SERPL-CCNC: 11 U/L (ref 5–33)
ALT SERPL-CCNC: 11 U/L (ref 5–33)
ANION GAP SERPL CALCULATED.3IONS-SCNC: 10 MMOL/L (ref 9–17)
ANION GAP SERPL CALCULATED.3IONS-SCNC: 11 MMOL/L (ref 9–17)
AST SERPL-CCNC: 13 U/L
AST SERPL-CCNC: 14 U/L
BILIRUB SERPL-MCNC: 0.64 MG/DL (ref 0.3–1.2)
BILIRUB SERPL-MCNC: 0.64 MG/DL (ref 0.3–1.2)
BILIRUBIN DIRECT: 0.18 MG/DL
BILIRUBIN, INDIRECT: 0.46 MG/DL (ref 0–1)
BUN BLDV-MCNC: 9 MG/DL (ref 6–20)
BUN BLDV-MCNC: 9 MG/DL (ref 6–20)
BUN/CREAT BLD: ABNORMAL (ref 9–20)
BUN/CREAT BLD: ABNORMAL (ref 9–20)
C-PEPTIDE: 3.5 NG/ML (ref 1.1–4.4)
CALCIUM SERPL-MCNC: 9.5 MG/DL (ref 8.6–10.4)
CALCIUM SERPL-MCNC: 9.5 MG/DL (ref 8.6–10.4)
CHLORIDE BLD-SCNC: 104 MMOL/L (ref 98–107)
CHLORIDE BLD-SCNC: 105 MMOL/L (ref 98–107)
CHOLESTEROL, FASTING: 154 MG/DL
CHOLESTEROL/HDL RATIO: 3
CO2: 23 MMOL/L (ref 20–31)
CO2: 24 MMOL/L (ref 20–31)
CREAT SERPL-MCNC: 0.81 MG/DL (ref 0.5–0.9)
CREAT SERPL-MCNC: 0.82 MG/DL (ref 0.5–0.9)
GFR AFRICAN AMERICAN: >60 ML/MIN
GFR AFRICAN AMERICAN: >60 ML/MIN
GFR NON-AFRICAN AMERICAN: >60 ML/MIN
GFR NON-AFRICAN AMERICAN: >60 ML/MIN
GFR SERPL CREATININE-BSD FRML MDRD: ABNORMAL ML/MIN/{1.73_M2}
GLOBULIN: NORMAL G/DL (ref 1.5–3.8)
GLUCOSE BLD-MCNC: 124 MG/DL (ref 70–99)
GLUCOSE BLD-MCNC: 125 MG/DL (ref 70–99)
HCT VFR BLD CALC: 40.6 % (ref 36.3–47.1)
HDLC SERPL-MCNC: 52 MG/DL
HEMOGLOBIN: 13.2 G/DL (ref 11.9–15.1)
INR BLD: 1
INSULIN COMMENT: NORMAL
INSULIN REFERENCE RANGE:: NORMAL
INSULIN: 19.2 MU/L
LDL CHOLESTEROL: 84 MG/DL (ref 0–130)
MCH RBC QN AUTO: 28.5 PG (ref 25.2–33.5)
MCHC RBC AUTO-ENTMCNC: 32.5 G/DL (ref 28.4–34.8)
MCV RBC AUTO: 87.7 FL (ref 82.6–102.9)
NRBC AUTOMATED: 0 PER 100 WBC
PDW BLD-RTO: 13 % (ref 11.8–14.4)
PLATELET # BLD: 184 K/UL (ref 138–453)
PMV BLD AUTO: 11.8 FL (ref 8.1–13.5)
POTASSIUM SERPL-SCNC: 4.1 MMOL/L (ref 3.7–5.3)
POTASSIUM SERPL-SCNC: 4.2 MMOL/L (ref 3.7–5.3)
PROTHROMBIN TIME: 10.7 SEC (ref 9.4–12.6)
RBC # BLD: 4.63 M/UL (ref 3.95–5.11)
SODIUM BLD-SCNC: 138 MMOL/L (ref 135–144)
SODIUM BLD-SCNC: 139 MMOL/L (ref 135–144)
TOTAL PROTEIN: 7.2 G/DL (ref 6.4–8.3)
TOTAL PROTEIN: 7.3 G/DL (ref 6.4–8.3)
TRIGLYCERIDE, FASTING: 89 MG/DL
VLDLC SERPL CALC-MCNC: NORMAL MG/DL (ref 1–30)
WBC # BLD: 11.4 K/UL (ref 3.5–11.3)

## 2021-10-13 PROCEDURE — 80061 LIPID PANEL: CPT

## 2021-10-13 PROCEDURE — 80076 HEPATIC FUNCTION PANEL: CPT

## 2021-10-13 PROCEDURE — 85027 COMPLETE CBC AUTOMATED: CPT

## 2021-10-13 PROCEDURE — 84681 ASSAY OF C-PEPTIDE: CPT

## 2021-10-13 PROCEDURE — 82043 UR ALBUMIN QUANTITATIVE: CPT

## 2021-10-13 PROCEDURE — 80048 BASIC METABOLIC PNL TOTAL CA: CPT

## 2021-10-13 PROCEDURE — 82570 ASSAY OF URINE CREATININE: CPT

## 2021-10-13 PROCEDURE — 85610 PROTHROMBIN TIME: CPT

## 2021-10-13 PROCEDURE — 83525 ASSAY OF INSULIN: CPT

## 2021-10-13 PROCEDURE — 80053 COMPREHEN METABOLIC PANEL: CPT

## 2021-10-13 PROCEDURE — 36415 COLL VENOUS BLD VENIPUNCTURE: CPT

## 2021-10-14 LAB
CREATININE URINE: 170.5 MG/DL (ref 28–217)
MICROALBUMIN/CREAT 24H UR: 14 MG/L
MICROALBUMIN/CREAT UR-RTO: 8 MCG/MG CREAT

## 2021-10-20 ENCOUNTER — TELEPHONE (OUTPATIENT)
Dept: FAMILY MEDICINE CLINIC | Age: 51
End: 2021-10-20

## 2021-10-20 ENCOUNTER — HOSPITAL ENCOUNTER (OUTPATIENT)
Facility: CLINIC | Age: 51
Discharge: HOME | End: 2021-10-22
Payer: COMMERCIAL

## 2021-10-20 ENCOUNTER — HOSPITAL ENCOUNTER (OUTPATIENT)
Dept: GENERAL RADIOLOGY | Facility: CLINIC | Age: 51
Discharge: HOME | End: 2021-10-22
Payer: COMMERCIAL

## 2021-10-20 DIAGNOSIS — J18.9 PNEUMONIA DUE TO INFECTIOUS ORGANISM, UNSPECIFIED LATERALITY, UNSPECIFIED PART OF LUNG: Primary | ICD-10-CM

## 2021-10-20 DIAGNOSIS — J47.1 BRONCHIECTASIS WITH (ACUTE) EXACERBATION (HCC): ICD-10-CM

## 2021-10-20 PROCEDURE — 71046 X-RAY EXAM CHEST 2 VIEWS: CPT

## 2021-10-20 NOTE — TELEPHONE ENCOUNTER
Pt called in stating that she went to [facility] and she has pneumonia in her right lung and they want her to get a covid test done.     Please advise

## 2021-10-21 ENCOUNTER — TELEPHONE (OUTPATIENT)
Dept: FAMILY MEDICINE CLINIC | Age: 51
End: 2021-10-21

## 2021-10-25 ENCOUNTER — HOSPITAL ENCOUNTER (OUTPATIENT)
Dept: CT IMAGING | Facility: CLINIC | Age: 51
Discharge: HOME | End: 2021-10-27
Payer: COMMERCIAL

## 2021-10-25 ENCOUNTER — TELEPHONE (OUTPATIENT)
Dept: ONCOLOGY | Age: 51
End: 2021-10-25

## 2021-10-25 DIAGNOSIS — J18.9 PNEUMONIA DUE TO INFECTIOUS ORGANISM, UNSPECIFIED LATERALITY, UNSPECIFIED PART OF LUNG: ICD-10-CM

## 2021-10-25 PROCEDURE — 71250 CT THORAX DX C-: CPT

## 2021-11-18 ENCOUNTER — OFFICE VISIT (OUTPATIENT)
Dept: ORTHOPEDIC SURGERY | Age: 51
End: 2021-11-18
Payer: COMMERCIAL

## 2021-11-18 VITALS — BODY MASS INDEX: 28.53 KG/M2 | HEIGHT: 63 IN | WEIGHT: 161 LBS | RESPIRATION RATE: 12 BRPM

## 2021-11-18 DIAGNOSIS — M75.81 TENDINITIS OF RIGHT ROTATOR CUFF: ICD-10-CM

## 2021-11-18 DIAGNOSIS — M77.11 LATERAL EPICONDYLITIS OF RIGHT ELBOW: ICD-10-CM

## 2021-11-18 DIAGNOSIS — M25.562 CHRONIC PAIN OF LEFT KNEE: Primary | ICD-10-CM

## 2021-11-18 DIAGNOSIS — G89.29 CHRONIC PAIN OF LEFT KNEE: Primary | ICD-10-CM

## 2021-11-18 PROCEDURE — 99215 OFFICE O/P EST HI 40 MIN: CPT | Performed by: PHYSICIAN ASSISTANT

## 2021-11-18 PROCEDURE — 20610 DRAIN/INJ JOINT/BURSA W/O US: CPT | Performed by: PHYSICIAN ASSISTANT

## 2021-11-18 RX ORDER — BUPIVACAINE HYDROCHLORIDE 2.5 MG/ML
2 INJECTION, SOLUTION INFILTRATION; PERINEURAL ONCE
Status: COMPLETED | OUTPATIENT
Start: 2021-11-18 | End: 2021-11-18

## 2021-11-18 RX ORDER — IBUPROFEN 600 MG/1
600 TABLET ORAL EVERY 6 HOURS PRN
Qty: 60 TABLET | Refills: 0 | Status: SHIPPED | OUTPATIENT
Start: 2021-11-18 | End: 2022-02-28 | Stop reason: SDUPTHER

## 2021-11-18 RX ORDER — TRIAMCINOLONE ACETONIDE 40 MG/ML
40 INJECTION, SUSPENSION INTRA-ARTICULAR; INTRAMUSCULAR ONCE
Status: COMPLETED | OUTPATIENT
Start: 2021-11-18 | End: 2021-11-18

## 2021-11-18 RX ORDER — LIDOCAINE HYDROCHLORIDE 10 MG/ML
2 INJECTION, SOLUTION INFILTRATION; PERINEURAL ONCE
Status: COMPLETED | OUTPATIENT
Start: 2021-11-18 | End: 2021-11-18

## 2021-11-18 RX ORDER — MELOXICAM 15 MG/1
15 TABLET ORAL DAILY
Qty: 30 TABLET | Refills: 0 | Status: SHIPPED | OUTPATIENT
Start: 2021-11-18 | End: 2022-08-19

## 2021-11-18 RX ADMIN — TRIAMCINOLONE ACETONIDE 40 MG: 40 INJECTION, SUSPENSION INTRA-ARTICULAR; INTRAMUSCULAR at 14:47

## 2021-11-18 RX ADMIN — LIDOCAINE HYDROCHLORIDE 2 ML: 10 INJECTION, SOLUTION INFILTRATION; PERINEURAL at 14:44

## 2021-11-18 RX ADMIN — BUPIVACAINE HYDROCHLORIDE 5 MG: 2.5 INJECTION, SOLUTION INFILTRATION; PERINEURAL at 14:45

## 2021-11-18 NOTE — PATIENT INSTRUCTIONS
INJECTION CARE    The injection site should never get red, hot, or swollen and if it does the patient will contact our office right away. With a cortisone steroid injection, the patient may experience a increase in soreness the first 24-48 hours due to a cortisone flair and can take anti-inflammatories for a short period of time to reduce that soreness. With a lubrication injection, on rare occasion the patient may develop swelling and pain if having a reaction to the medication. If this happens, try an anti-inflammatory medication and ice as needed. If pain and swelling continues, call the office for further instructions. The patient should not submerge the injection site in water for a minimum of 24 hours to avoid infection. This means no lakes, pools, ponds, or hot tubs for 24 hours. If the patient is diabetic the injection may increase their blood sugar for up to one week. The patient can do this cortisone injection once every 3 months as needed and lubrication injections every 6 months. Patient Education        Tennis Elbow: Exercises  Introduction  Here are some examples of exercises for you to try. The exercises may be suggested for a condition or for rehabilitation. Start each exercise slowly. Ease off the exercises if you start to have pain. You will be told when to start these exercises and which ones will work best for you. How to do the exercises  Wrist flexor stretch    1. Extend your arm in front of you with your palm up. 2. Bend your wrist, pointing your hand toward the floor. 3. With your other hand, gently bend your wrist farther until you feel a mild to moderate stretch in your forearm. 4. Hold for at least 15 to 30 seconds. Repeat 2 to 4 times. Wrist extensor stretch    1. Repeat steps 1 to 4 of the stretch above but begin with your extended hand palm down. Ball or sock squeeze    1. Hold a tennis ball (or a rolled-up sock) in your hand.   2. Make a fist around the ball (or sock) and squeeze. 3. Hold for about 6 seconds, and then relax for up to 10 seconds. 4. Repeat 8 to 12 times. 5. Switch the ball (or sock) to your other hand and do 8 to 12 times. Wrist deviation    1. Sit so that your arm is supported but your hand hangs off the edge of a flat surface, such as a table. 2. Hold your hand out like you are shaking hands with someone. 3. Move your hand up and down. 4. Repeat this motion 8 to 12 times. 5. Switch arms. 6. Try to do this exercise twice with each hand. Wrist curls    1. Place your forearm on a table with your hand hanging over the edge of the table, palm up. 2. Place a 1- to 2-pound weight in your hand. This may be a dumbbell, a can of food, or a filled water bottle. 3. Slowly raise and lower the weight while keeping your forearm on the table and your palm facing up. 4. Repeat this motion 8 to 12 times. 5. Switch arms, and do steps 1 through 4.  6. Repeat with your hand facing down toward the floor. Switch arms. Biceps curls    1. Sit leaning forward with your legs slightly spread and your left hand on your left thigh. 2. Place your right elbow on your right thigh, and hold the weight with your forearm horizontal.  3. Slowly curl the weight up and toward your chest.  4. Repeat this motion 8 to 12 times. 5. Switch arms, and do steps 1 through 4. Follow-up care is a key part of your treatment and safety. Be sure to make and go to all appointments, and call your doctor if you are having problems. It's also a good idea to know your test results and keep a list of the medicines you take. Where can you learn more? Go to [email or web address] and sign in to your [employer] account. Enter D295 in the [employer] box to learn more about \"Tennis Elbow: Exercises. \"     If you do not have an account, please click on the \"Sign Up Now\" link. Current as of: July 1, 2021               Content Version: 13.0  © 7200-0075 [employer]. Care instructions adapted under license by [facility] ([facility]). If you have questions about a medical condition or this instruction, always ask your healthcare professional. [address] any warranty or liability for your use of this information.

## 2021-11-18 NOTE — PROGRESS NOTES
1756 [facility], 20 [facility] 344 [address]as, [address] [place] [address], [address] [facility]           Dept Phone: [phone]           Dept Fax:  [phone]           Latha Doherty          Dept Phone: [phone]           Dept Fax:  [phone]      Chief Compliant:  Chief Complaint   Patient presents with    Shoulder Pain     Right, subacromial inection- 5/14/21        History of Present Illness:  Lenora returns today. This is a 49 y.o. female who presents to the clinic today for follow up of shoulder pain. Please see previous clinic note for more detailed history. Last seen on 5/14/2021 for right shoulder pain found to have some evidence of rotator cuff tendinitis. She underwent a subacromial injection at that time. Patient reports this injection provided significantly for pain for approximately 4 months however over the last 2 to 3 weeks her pain has gradually returned in the right shoulder. She is requesting repeat injection right shoulder. Patient is also requesting to be evaluated for right elbow pain and left knee pain. Right Elbow:  Patient reports this pain has been present over the last 2 weeks since she started lifting up her grandchild more recently. Pain is most severe to the lateral aspect of the right elbow but she notes radiation of pain distal towards the wrist.  She notes intermittent numbness and tingling as well. Of note patient with history of thoracic outlet syndrome as well as known herniated disc and cervical radicular symptoms. 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 She states this pain in the elbow feels different than her cervical radiculopathy.     Left Knee:  Patient reports pain in the left knee has been present for approximately 1 year she denies any new injury or trauma. Pain is mostly to the posterior medial knee. Associate with intermittent swelling that seems to be activity related. Patient reports she has tried physical therapy for quite some time in this left knee with minimal improvement of pain. Pain is aggravated by deep flexion and associated with stiffness as well. Review of Systems   Constitutional: Negative for fever, chills, sweats, recent illness, or recent injury. Neurological: Negative for headaches, numbness, or weakness. Integumentary: Negative for rash, itching, ecchymosis, abrasions, or laceration. Musculoskeletal: Positive for Shoulder Pain (Right, subacromial inection- 5/14/21)       Physical Exam:  Constitutional: Patient is oriented to person, place, and time. Patient appears well-developed and well nourished. Musculoskeletal:    right Shoulder    Tenderness:  Mild tenderness to subacromial space. No tenderness over the lateral or anterior shoulder. No bicipital tenderness or AC joint tenderness. Range of Motion:      Active Abduction: 160     Passive Abduction: 170     Forward Flexion: 140     Extension: 60     External Rotation:      Internal Rotation 0 Deg: Internal Rotation 90 Deg:        Muscle Strength:      Abduction: 5/5     Internal Rotation: 5/5     External Rotation: 5/5     Supraspinatus: 5/5     Subscapularis: 5/5     Biceps: 5/5       Tests:      Impingement: Positive     Cervantes Post: Positive     Cross Arm: Negative     Apprehension: Negative     Sulcus SIgn: Negative     Drop Arm Test: Negative     right Elbow    Tenderness:  Significant tenderness over the lateral epicondyle. Mild tenderness over the triceps tendon. No tenderness over the medial epicondyle or the antecubital fossa.        Range of Motion:      Flexion: 130     Extension: 0     Pronation: 90     Supination: 90        Strength      Wrist Extension 4/5     Wrist Flexion 5/5      5/5     Pronation 5/5     Supination 5/5       Varus Stress Test Negative   Valgus Stress Test Negative   Tinels Cubital Tunnel Negative     Significant pain with resisted wrist extension    left Knee    Swelling: Mild over the posterior medial knee concerning for possible small Baker's cyst.   Effusion: Negative   Tenderness:  Mild tenderness over the medial hamstring tendon. No tenderness over the medial lateral joint line no tenderness over the quad or patellar tendon       Range of Motion:      Extension: 0     Flexion: 130       McMurrays: Negative   Anterior Lachmann: Negative   Posterior Lachmann: Negative   Anterior Drawer: Negative   Posterior Drawer: Negative   Varus Stress Test: Negative   Valgus Stress Test: Negative   Pivot Shift: Negative   Patellar Apprehension: No     Neurological: Patient is alert and oriented to person, place, and time. Normal strenght. No sensory deficit. Skin: Skin is warm and dry  Psychiatric: Behavior is normal. Thought content normal.  Nursing note and vitals reviewed. Labs and Imaging:     XR taken today:  No results found. X-rays taken in clinic and preliminarily reviewed by me:  AP bilateral knees standing lateral view of the left knee maintenance of joint space without evidence of acute fracture. There does appear to be a likely intra-articular loose body to the posterior knee small effusion present. Assessment and Plan:  1. Chronic pain of left knee    2. Tendinitis of right rotator cuff    3. Lateral epicondylitis of right elbow            PLAN:  This is a 49 y.o. female who presents to the clinic today for follow up right shoulder pain. Patient also here to be evaluated for her chronic left knee and acute right elbow pain. Right RTC tendonitis  Patient found to have rotator cuff tendinitis at last appointment on 5/14/2021 and underwent a subacromial injection at that time that provided significantly for pain. Requesting repeat injection today.   1. Kenalog injection provided in the shoulder as outlined below  2. Patient educated on postinjection care  3. Continue with home exercise program.    Procedure: right shoulder subacromial space injection  Following an appropriate discussion with the patient regarding the risks and benefits of the procedure she consented to proceed. her right shoulder was prepped using chlorhexadine solution. Using aseptic technique and through a posterior approach, her right shoulder subacromial space was injected with a 4 cc mixture of 1cc 40mg/ml kenalog and 3 cc of 1% lidocaine without epinephrine. A band aid was applied to the injection site. she tolerated the injection with no immediate adverse reactions. Lateral epicondylitis, right  Patient reports 2-week history of right elbow pain to the posterior lateral elbow most consistent with lateral epicondylitis. Also discussed the possibility of triceps tendinitis, olecranon bursitis, fracture, septic joint. There is no evidence of infectious etiology. There is no significant swelling over the bursa to indicate bursitis and patient denies any trauma and is minimally tender to bony prominences likely ruling out fracture. 1.  Discussed at length the nature and natural course of lateral epicondylitis. Patient is educated that this will likely do well with conservative management she like to proceed after discussion. 2.  She is provided with a home exercise program  3. She is provided with a tennis elbow band. 4.  Start meloxicam 15 mg daily which electronically sent to her pharmacy. Patient reports she will hold off on starting Mobic but in the meantime does request a short course of ibuprofen which is also provided to her today. She is educated she cannot take both these at the same time. Left knee pain:  This is a chronic issue patient reports pain started 1 year ago without any injury or trauma.   On examination there is no evidence of ligamentous or meniscal pathology however patient does have some posterior swelling and some mild tenderness consistent with possible small Baker's cyst.  1.  At this time patient will benefit from a daily anti-inflammatory and meloxicam electronic sent to her pharmacy. 2.  Did discuss possibility of a corticosteroid injection in the future however since she was undergoing a shoulder injection today she elected to hold off on this at this time. 3.  We will see patient back on as needed basis however she may call or concern for any questions or concerns    Please note that this chart was generated using voice recognition Dragon dictation software. Although every effort was made to ensure the accuracy of this automated transcription, some errors in transcription may have occurred.

## 2021-12-23 ENCOUNTER — HOSPITAL ENCOUNTER (OUTPATIENT)
Dept: ULTRASOUND IMAGING | Facility: CLINIC | Age: 51
Discharge: HOME | End: 2021-12-25
Payer: COMMERCIAL

## 2021-12-23 ENCOUNTER — HOSPITAL ENCOUNTER (OUTPATIENT)
Facility: CLINIC | Age: 51
Discharge: HOME | End: 2021-12-23
Payer: COMMERCIAL

## 2021-12-23 ENCOUNTER — HOSPITAL ENCOUNTER (OUTPATIENT)
Dept: CT IMAGING | Facility: CLINIC | Age: 51
Discharge: HOME | End: 2021-12-25
Payer: COMMERCIAL

## 2021-12-23 DIAGNOSIS — K76.0 FATTY LIVER: ICD-10-CM

## 2021-12-23 DIAGNOSIS — D44.10 NEOPLASM OF UNCERTAIN BEHAVIOR OF ADRENAL GLAND, UNSPECIFIED LATERALITY: ICD-10-CM

## 2021-12-23 LAB
-: ABNORMAL
AFP: 3.6 UG/L
ALBUMIN SERPL-MCNC: 4.2 G/DL (ref 3.5–5.2)
ALBUMIN SERPL-MCNC: 4.3 G/DL (ref 3.5–5.2)
ALBUMIN/GLOBULIN RATIO: 1.7 (ref 1–2.5)
ALP BLD-CCNC: 58 U/L (ref 35–104)
ALP BLD-CCNC: 60 U/L (ref 35–104)
ALT SERPL-CCNC: 17 U/L (ref 5–33)
ALT SERPL-CCNC: 19 U/L (ref 5–33)
AMORPHOUS: ABNORMAL
ANION GAP SERPL CALCULATED.3IONS-SCNC: 12 MMOL/L (ref 9–17)
AST SERPL-CCNC: 13 U/L
AST SERPL-CCNC: 13 U/L
BACTERIA: ABNORMAL
BILIRUB SERPL-MCNC: 0.38 MG/DL (ref 0.3–1.2)
BILIRUBIN DIRECT: 0.14 MG/DL
BILIRUBIN URINE: NEGATIVE
BILIRUBIN, INDIRECT: 0.24 MG/DL (ref 0–1)
BUN BLDV-MCNC: 10 MG/DL (ref 6–20)
BUN/CREAT BLD: ABNORMAL (ref 9–20)
C-REACTIVE PROTEIN: <3 MG/L (ref 0–5)
CALCIUM SERPL-MCNC: 9.3 MG/DL (ref 8.6–10.4)
CASTS UA: ABNORMAL /LPF (ref 0–2)
CHLORIDE BLD-SCNC: 106 MMOL/L (ref 98–107)
CO2: 25 MMOL/L (ref 20–31)
COLOR: ABNORMAL
COMMENT UA: ABNORMAL
CREAT SERPL-MCNC: 0.81 MG/DL (ref 0.5–0.9)
CREAT SERPL-MCNC: 0.81 MG/DL (ref 0.5–0.9)
CRYSTALS, UA: ABNORMAL /HPF
EPITHELIAL CELLS UA: ABNORMAL /HPF (ref 0–5)
ESTIMATED AVERAGE GLUCOSE: 171 MG/DL
GFR AFRICAN AMERICAN: >60 ML/MIN
GFR AFRICAN AMERICAN: >60 ML/MIN
GFR NON-AFRICAN AMERICAN: >60 ML/MIN
GFR NON-AFRICAN AMERICAN: >60 ML/MIN
GFR SERPL CREATININE-BSD FRML MDRD: ABNORMAL ML/MIN/{1.73_M2}
GFR SERPL CREATININE-BSD FRML MDRD: ABNORMAL ML/MIN/{1.73_M2}
GFR SERPL CREATININE-BSD FRML MDRD: NORMAL ML/MIN/{1.73_M2}
GFR SERPL CREATININE-BSD FRML MDRD: NORMAL ML/MIN/{1.73_M2}
GLOBULIN: NORMAL G/DL (ref 1.5–3.8)
GLUCOSE BLD-MCNC: 133 MG/DL (ref 70–99)
GLUCOSE URINE: NEGATIVE
HBA1C MFR BLD: 7.6 % (ref 4–6)
HCT VFR BLD CALC: 40.3 % (ref 36.3–47.1)
HCT VFR BLD CALC: 40.3 % (ref 36.3–47.1)
HEMOGLOBIN: 13.4 G/DL (ref 11.9–15.1)
HEMOGLOBIN: 13.4 G/DL (ref 11.9–15.1)
INR BLD: 1
KETONES, URINE: NEGATIVE
LEUKOCYTE ESTERASE, URINE: NEGATIVE
MCH RBC QN AUTO: 28.6 PG (ref 25.2–33.5)
MCH RBC QN AUTO: 28.6 PG (ref 25.2–33.5)
MCHC RBC AUTO-ENTMCNC: 33.3 G/DL (ref 28.4–34.8)
MCHC RBC AUTO-ENTMCNC: 33.3 G/DL (ref 28.4–34.8)
MCV RBC AUTO: 86.1 FL (ref 82.6–102.9)
MCV RBC AUTO: 86.1 FL (ref 82.6–102.9)
MUCUS: ABNORMAL
NITRITE, URINE: NEGATIVE
NRBC AUTOMATED: 0 PER 100 WBC
NRBC AUTOMATED: 0 PER 100 WBC
OTHER OBSERVATIONS UA: ABNORMAL
PDW BLD-RTO: 13.2 % (ref 11.8–14.4)
PDW BLD-RTO: 13.2 % (ref 11.8–14.4)
PH UA: 5 (ref 5–8)
PLATELET # BLD: 149 K/UL (ref 138–453)
PLATELET # BLD: 149 K/UL (ref 138–453)
PMV BLD AUTO: 11 FL (ref 8.1–13.5)
PMV BLD AUTO: 11 FL (ref 8.1–13.5)
POTASSIUM SERPL-SCNC: 4.3 MMOL/L (ref 3.7–5.3)
PROTEIN UA: ABNORMAL
PROTHROMBIN TIME: 10.1 SEC (ref 9.4–12.6)
RBC # BLD: 4.68 M/UL (ref 3.95–5.11)
RBC # BLD: 4.68 M/UL (ref 3.95–5.11)
RBC UA: ABNORMAL /HPF (ref 0–2)
RENAL EPITHELIAL, UA: ABNORMAL /HPF
SEDIMENTATION RATE, ERYTHROCYTE: 10 MM (ref 0–30)
SODIUM BLD-SCNC: 143 MMOL/L (ref 135–144)
SPECIFIC GRAVITY UA: 1.03 (ref 1–1.03)
T3 FREE: 3.27 PG/ML (ref 2.02–4.43)
THYROXINE, FREE: 0.99 NG/DL (ref 0.93–1.7)
TOTAL PROTEIN: 6.8 G/DL (ref 6.4–8.3)
TRICHOMONAS: ABNORMAL
TSH SERPL DL<=0.05 MIU/L-ACNC: 2.47 MIU/L (ref 0.3–5)
TURBIDITY: ABNORMAL
URINE HGB: NEGATIVE
UROBILINOGEN, URINE: NORMAL
WBC # BLD: 7.9 K/UL (ref 3.5–11.3)
WBC # BLD: 7.9 K/UL (ref 3.5–11.3)
WBC UA: ABNORMAL /HPF (ref 0–5)
YEAST: ABNORMAL

## 2021-12-23 PROCEDURE — 81001 URINALYSIS AUTO W/SCOPE: CPT

## 2021-12-23 PROCEDURE — 80076 HEPATIC FUNCTION PANEL: CPT

## 2021-12-23 PROCEDURE — 85652 RBC SED RATE AUTOMATED: CPT

## 2021-12-23 PROCEDURE — 84460 ALANINE AMINO (ALT) (SGPT): CPT

## 2021-12-23 PROCEDURE — 36415 COLL VENOUS BLD VENIPUNCTURE: CPT

## 2021-12-23 PROCEDURE — 83835 ASSAY OF METANEPHRINES: CPT

## 2021-12-23 PROCEDURE — 85027 COMPLETE CBC AUTOMATED: CPT

## 2021-12-23 PROCEDURE — 84481 FREE ASSAY (FT-3): CPT

## 2021-12-23 PROCEDURE — 84450 TRANSFERASE (AST) (SGOT): CPT

## 2021-12-23 PROCEDURE — 84443 ASSAY THYROID STIM HORMONE: CPT

## 2021-12-23 PROCEDURE — 84439 ASSAY OF FREE THYROXINE: CPT

## 2021-12-23 PROCEDURE — 82565 ASSAY OF CREATININE: CPT

## 2021-12-23 PROCEDURE — 82105 ALPHA-FETOPROTEIN SERUM: CPT

## 2021-12-23 PROCEDURE — 82040 ASSAY OF SERUM ALBUMIN: CPT

## 2021-12-23 PROCEDURE — 83036 HEMOGLOBIN GLYCOSYLATED A1C: CPT

## 2021-12-23 PROCEDURE — 85610 PROTHROMBIN TIME: CPT

## 2021-12-23 PROCEDURE — 84075 ASSAY ALKALINE PHOSPHATASE: CPT

## 2021-12-23 PROCEDURE — 74150 CT ABDOMEN W/O CONTRAST: CPT

## 2021-12-23 PROCEDURE — 80048 BASIC METABOLIC PNL TOTAL CA: CPT

## 2021-12-23 PROCEDURE — 76705 ECHO EXAM OF ABDOMEN: CPT

## 2021-12-23 PROCEDURE — 86140 C-REACTIVE PROTEIN: CPT

## 2021-12-28 LAB
METANEPH/PLASMA INTERP: NORMAL
METANEPHRINE: 0.14 NMOL/L (ref 0–0.49)
NORMETANEPHRINE PLASMA: 0.64 NMOL/L (ref 0–0.89)

## 2022-01-04 ENCOUNTER — TELEPHONE (OUTPATIENT)
Dept: FAMILY MEDICINE CLINIC | Age: 52
End: 2022-01-04

## 2022-01-11 ENCOUNTER — TELEPHONE (OUTPATIENT)
Dept: FAMILY MEDICINE CLINIC | Age: 52
End: 2022-01-11

## 2022-01-11 RX ORDER — ALBUTEROL SULFATE 90 UG/1
2 AEROSOL, METERED RESPIRATORY (INHALATION) EVERY 6 HOURS PRN
Qty: 1 EACH | Refills: 1 | Status: SHIPPED | OUTPATIENT
Start: 2022-01-11

## 2022-01-11 NOTE — TELEPHONE ENCOUNTER
----- Message from Michelle Gee sent at 1/10/2022  3:36 PM EST -----  Subject: Refill Request    QUESTIONS  Name of Medication? albuterol sulfate HFA (VENTOLIN HFA) 108 (90 Base)   MCG/ACT inhaler  Patient-reported dosage and instructions? PRN  How many days do you have left? 0  Preferred Pharmacy? [facility]/PH[facility]ACY #[id number]  Pharmacy phone number (if available)? [phone]  Additional Information for Provider? Pt would also like a refill on her   nicotine gum and a new script for her handicap card.   ---------------------------------------------------------------------------  --------------  CALL BACK INFO  What is the best way for the office to contact you? OK to leave message on   voicemail  Preferred Call Back Phone Number?  [phone]

## 2022-01-11 NOTE — TELEPHONE ENCOUNTER
Patient called [facility] requesting a order  to get a covid antibody test. Please advise.  Thank you

## 2022-01-11 NOTE — TELEPHONE ENCOUNTER
Festus Nayak is calling to request a refill on the following medication(s):    Last Visit Date (If Applicable):  4/77/8955    Next Visit Date:    Visit date not found    Medication Request:  Requested Prescriptions     Pending Prescriptions Disp Refills    albuterol sulfate HFA (VENTOLIN HFA) 108 (90 Base) MCG/ACT inhaler 1 each 1     Sig: Inhale 2 puffs into the lungs every 6 hours as needed for Wheezing

## 2022-01-12 ENCOUNTER — HOSPITAL ENCOUNTER (EMERGENCY)
Facility: CLINIC | Age: 52
Discharge: HOME | End: 2022-01-12
Attending: EMERGENCY MEDICINE
Payer: COMMERCIAL

## 2022-01-12 VITALS
HEIGHT: 63 IN | BODY MASS INDEX: 27.82 KG/M2 | OXYGEN SATURATION: 97 % | DIASTOLIC BLOOD PRESSURE: 90 MMHG | WEIGHT: 157 LBS | RESPIRATION RATE: 16 BRPM | TEMPERATURE: 97.9 F | HEART RATE: 79 BPM | SYSTOLIC BLOOD PRESSURE: 159 MMHG

## 2022-01-12 DIAGNOSIS — R73.9 HYPERGLYCEMIA: Primary | ICD-10-CM

## 2022-01-12 LAB
-: ABNORMAL
AMORPHOUS: ABNORMAL
ANION GAP SERPL CALCULATED.3IONS-SCNC: 14 MMOL/L (ref 9–17)
BACTERIA: ABNORMAL
BILIRUBIN URINE: NEGATIVE
BUN BLDV-MCNC: 14 MG/DL (ref 6–20)
BUN/CREAT BLD: ABNORMAL (ref 9–20)
CALCIUM SERPL-MCNC: 9.8 MG/DL (ref 8.6–10.4)
CASTS UA: ABNORMAL /LPF (ref 0–2)
CHLORIDE BLD-SCNC: 95 MMOL/L (ref 98–107)
CHP ED QC CHECK: NORMAL
CHP ED QC CHECK: NORMAL
CO2: 24 MMOL/L (ref 20–31)
COLOR: YELLOW
COMMENT UA: ABNORMAL
CREAT SERPL-MCNC: 0.7 MG/DL (ref 0.5–0.9)
CRYSTALS, UA: ABNORMAL /HPF
EPITHELIAL CELLS UA: ABNORMAL /HPF (ref 0–5)
GFR AFRICAN AMERICAN: >60 ML/MIN
GFR NON-AFRICAN AMERICAN: >60 ML/MIN
GFR SERPL CREATININE-BSD FRML MDRD: ABNORMAL ML/MIN/{1.73_M2}
GFR SERPL CREATININE-BSD FRML MDRD: ABNORMAL ML/MIN/{1.73_M2}
GLUCOSE BLD-MCNC: 295 MG/DL
GLUCOSE BLD-MCNC: 295 MG/DL (ref 65–105)
GLUCOSE BLD-MCNC: 466 MG/DL (ref 70–99)
GLUCOSE BLD-MCNC: 484 MG/DL
GLUCOSE BLD-MCNC: 484 MG/DL (ref 65–105)
GLUCOSE URINE: ABNORMAL
KETONES, URINE: NEGATIVE
LEUKOCYTE ESTERASE, URINE: NEGATIVE
MUCUS: ABNORMAL
NITRITE, URINE: NEGATIVE
OTHER OBSERVATIONS UA: ABNORMAL
PH UA: 5 (ref 5–8)
POTASSIUM SERPL-SCNC: 3.9 MMOL/L (ref 3.7–5.3)
PROTEIN UA: NEGATIVE
RBC UA: ABNORMAL /HPF (ref 0–2)
RENAL EPITHELIAL, UA: ABNORMAL /HPF
SODIUM BLD-SCNC: 133 MMOL/L (ref 135–144)
SPECIFIC GRAVITY UA: 1.01 (ref 1–1.03)
TRICHOMONAS: ABNORMAL
TURBIDITY: CLEAR
URINE HGB: ABNORMAL
UROBILINOGEN, URINE: NORMAL
WBC UA: ABNORMAL /HPF (ref 0–5)
YEAST: ABNORMAL

## 2022-01-12 PROCEDURE — 6370000000 HC RX 637 (ALT 250 FOR IP): Performed by: EMERGENCY MEDICINE

## 2022-01-12 PROCEDURE — 96374 THER/PROPH/DIAG INJ IV PUSH: CPT

## 2022-01-12 PROCEDURE — 99284 EMERGENCY DEPT VISIT MOD MDM: CPT

## 2022-01-12 PROCEDURE — 81001 URINALYSIS AUTO W/SCOPE: CPT

## 2022-01-12 PROCEDURE — 82947 ASSAY GLUCOSE BLOOD QUANT: CPT

## 2022-01-12 PROCEDURE — 80048 BASIC METABOLIC PNL TOTAL CA: CPT

## 2022-01-12 PROCEDURE — 36415 COLL VENOUS BLD VENIPUNCTURE: CPT

## 2022-01-12 PROCEDURE — 2580000003 HC RX 258: Performed by: EMERGENCY MEDICINE

## 2022-01-12 RX ORDER — 0.9 % SODIUM CHLORIDE 0.9 %
1000 INTRAVENOUS SOLUTION INTRAVENOUS ONCE
Status: COMPLETED | OUTPATIENT
Start: 2022-01-12 | End: 2022-01-12

## 2022-01-12 RX ADMIN — INSULIN HUMAN 5 UNITS: 100 INJECTION, SOLUTION PARENTERAL at 22:10

## 2022-01-12 RX ADMIN — SODIUM CHLORIDE 1000 ML: 9 INJECTION, SOLUTION INTRAVENOUS at 21:07

## 2022-01-13 NOTE — ED TRIAGE NOTES
Patient covid positive on sunday. Patient states was given decadron and states her blood glucose was in the 516. Patient states she is increase urination and thirst. Patient states Januvia.

## 2022-01-13 NOTE — ED PROVIDER NOTES
eMERGENCY dEPARTMENT eNCOUnter      Pt Name: Agustina Loyola  MRN: [id number]  [place] 1970  Date of evaluation: 2022      CHIEF COMPLAINT       Chief Complaint   Patient presents with    Hyperglycemia         [address] is a 49 y.o. female who presents with high blood sugar. Patient has a known history of type 2 diabetes states she was placed on prednisone presumably for COVID states she had noticed that when she is on steroids she has problems however she later went to longer sugars were in the upper 500s so she came in for evaluation states she has been urinating more frequently lately but she denies any other symptoms        REVIEW OF SYSTEMS       Review of systems are all reviewed and negative except stated above in HPI    [address]    has a past medical history of Anomalous coronary artery origin, Anxiety, Asthma, Back pain, DDD (degenerative disc disease), cervical, DDD (degenerative disc disease), lumbar, Diabetes mellitus ([facility] [address] Fatty liver, Mitral prolapse, Pneumonia, Thoracic outlet syndrome, and Thyroid goiter. SURGICAL HISTORY      has a past surgical history that includes Tubal ligation;  section; Cervical spine surgery; hernia repair; Colonoscopy; Endoscopy, colon, diagnostic; bronchoscopy; Cardiac surgery; Tonsillectomy; pr sono guide needle biopsy; and Finger trigger release (Right, 10/9/2019).     CURRENT MEDICATIONS       Discharge Medication List as of 2022 10:59 PM      CONTINUE these medications which have NOT CHANGED    Details   albuterol sulfate HFA (VENTOLIN HFA) 108 (90 Base) MCG/ACT inhaler Inhale 2 puffs into the lungs every 6 hours as needed for Wheezing, Disp-1 each, R-1Normal      meloxicam (MOBIC) 15 MG tablet Take 1 tablet by mouth daily, Disp-30 tablet, R-0Normal      !! ibuprofen (ADVIL;MOTRIN) 600 MG tablet Take 1 tablet by mouth every 6 hours as needed for Pain, Disp-60 tablet, R-0Normal      !! ibuprofen (ADVIL;MOTRIN) 600 MG tablet Take 1 tablet by mouth 3 times daily as needed for Pain, Disp-30 tablet, R-0Normal      Continuous Blood Gluc  (DEXCOM G5 MOBILE ) KOURTNEY 1 each by Does not apply route daily, Disp-1 Device, R-0Print      Continuous Blood Gluc Sensor (DEXCOM G6 SENSOR) MISC 1 each by Does not apply route once a week, Disp-3 each, R-3Print      Continuous Blood Gluc Transmit (DEXCOM G5 MOBILE TRANSMITTER) MISC 1 each by Does not apply route once a week, Disp-1 each, R-0Print      JANUVIA 100 MG tablet TAKE 1 TABLET BY MOUTH EVERY DAY, DAWHistorical Med      rosuvastatin (CRESTOR) 10 MG tablet TAKE 1 TABLET BY MOUTH EVERY DAYHistorical Med      fluticasone (FLONASE ALLERGY RELIEF) 50 MCG/ACT nasal spray 1 spray in each nostrilHistorical Med      mometasone-formoterol (DULERA) 100-5 MCG/ACT inhaler Inhale 2 puffs into the lungs 2 times daily Rinse mouth after using., Disp-1 Inhaler, R-2LOT: [id number]  EXP 11/27/2020Sample      hydrocortisone 2.5 % cream Apply once daily to corners of mouth, Disp-60 g, R-5, Normal      ketoconazole (NIZORAL) 2 % cream Apply once daily to corners of mouth, Disp-30 g, R-2, Normal      medroxyPROGESTERone (PROVERA) 10 MG tablet Take 10 mg by mouth as needed Historical Med      pantoprazole sodium (PROTONIX) 40 MG PACK packet Take 40 mg by mouth dailyHistorical Med      metoprolol succinate (TOPROL XL) 25 MG extended release tablet Take 2 tablets by mouth daily, Disp-60 tablet, R-3Normal      ranolazine (RANEXA) 1000 MG extended release tablet Take 1,000 mg by mouthHistorical Med      insulin glargine (TOUJEO SOLOSTAR) 300 UNIT/ML injection pen Inject 23 Units into the skin daily Historical Med      nitroGLYCERIN (NITROSTAT) 0.4 MG SL tablet Place 0.4 mg under the tongue every 5 minutes as needed for Chest pain Dissolve 1 tab under tongue at first sign of chest pain. May repeat every 5 minutes until relief is obtained.  If pain persists after taking 3 tabs in a 15-minute period, or the pain i s different than is typically experienced, call 9-1-1 immediately. TRUETEST TEST strip Starting 2016, Until Discontinued, R-0, MARIAH, Historical Med      TRUEPLUS LANCETS 33G MISC Starting 2016, Until Discontinued, R-0, MARIAH, Historical Med      albuterol (PROVENTIL) (2.5 MG/3ML) 0.083% nebulizer solution Take 3 mLs by nebulization every 6 hours as needed for Wheezing, Disp-120 each, R-3       !! - Potential duplicate medications found. Please discuss with provider. ALLERGIES     is allergic to bactrim [sulfamethoxazole-trimethoprim], biaxin [clarithromycin], ceclor [cefaclor], and vancomycin. FAMILY HISTORY     She indicated that her mother is alive. She indicated that her father is alive. She indicated that her maternal grandmother is alive. She indicated that her maternal grandfather is . She indicated that her paternal grandmother is alive. She indicated that her paternal grandfather is . family history includes Diabetes in her mother and paternal grandmother. SOCIAL HISTORY      reports that she has been smoking cigarettes. She has a 10.00 pack-year smoking history. She has never used smokeless tobacco. She reports that she does not drink alcohol and does not use drugs. PHYSICAL EXAM     INITIAL VITALS:  height is 5' 3\" (1.6 m) and weight is 71.2 kg (157 lb). Her oral temperature is 97.9 °F (36.6 °C). Her blood pressure is 159/90 (abnormal) and her pulse is 79. Her respiration is 16 and oxygen saturation is 97%.       General: Patient alert nontoxic-appearing female in no apparent distress  HEENT: Head is atraumatic conjunctive are clear pupils are equal reactive  Neck: Supple  Respiratory: Lung sounds are clear bilateral  Cardiac: Heart is regular rate and rhythm  GI: Abdomen soft nontender  Neuro: Patient has no gross focal neurological deficits at bedside exam    DIFFERENTIAL DIAGNOSIS/ MDM:     Fluids Baseline labs    DIAGNOSTIC RESULTS     EKG: All EKG's are interpreted by the Emergency Department Physician who either signs or Co-signs this chart in the absence of a cardiologist.        RADIOLOGY:   I directly visualized the following  images and reviewed the radiologist interpretations:  No orders to display         LABS:  Labs Reviewed   BASIC METABOLIC PANEL - Abnormal; Notable for the following components:       Result Value    Glucose 466 (*)     Sodium 133 (*)     Chloride 95 (*)     All other components within normal limits   URINE RT REFLEX TO CULTURE - Abnormal; Notable for the following components:    Glucose, Ur 3+ (*)     Urine Hgb TRACE (*)     All other components within normal limits   MICROSCOPIC URINALYSIS - Abnormal; Notable for the following components:    Other Observations UA   (*)     Value: Utilizing a urinalysis as the only screening method to exclude a potential uropathogen can be unreliable in many patient populations. Rapid screening tests are less sensitive than culture and if UTI is a clinical possibility, culture should be considered despite a negative urinalysis.     All other components within normal limits   POC GLUCOSE FINGERSTICK - Abnormal; Notable for the following components:    POC Glucose 484 (*)     All other components within normal limits   POC GLUCOSE FINGERSTICK - Abnormal; Notable for the following components:    POC Glucose 295 (*)     All other components within normal limits   POCT GLUCOSE - Normal   POCT GLUCOSE - Normal         EMERGENCY DEPARTMENT COURSE:   Vitals:    Vitals:    01/12/22 2049   BP: (!) 159/90   Pulse: 79   Resp: 16   Temp: 97.9 °F (36.6 °C)   TempSrc: Oral   SpO2: 97%   Weight: 71.2 kg (157 lb)   Height: 5' 3\" (1.6 m)     -------------------------  BP: (!) 159/90, Temp: 97.9 °F (36.6 °C), Pulse: 79, Resp: 16    Orders Placed This Encounter   Medications    0.9 % sodium chloride bolus    insulin regular (HUMULIN R;NOVOLIN R) injection 5 Units Re-evaluation Notes    Labs glucose is elevated she got a liter of fluids and 5 of insulin and sugar levels came down below 300 she is feeling much better at this time she will be discharged with follow-up as directed return if worse    CRITICAL CARE:   None      CONSULTS:      PROCEDURES:  None    FINAL IMPRESSION      1. Hyperglycemia          DISPOSITION/PLAN   DISPOSITION Decision To Discharge 01/12/2022 11:05:30 PM      Condition on Disposition    Stable    PATIENT REFERRED TO:  Jus Arellano MD  [id number] [facility] [phone]    In 2 days        DISCHARGE MEDICATIONS:  Discharge Medication List as of 1/12/2022 10:59 PM          (Please note that portions of this note were completed with a voice recognition program.  Efforts were made to edit the dictations but occasionally words are mis-transcribed.)    Moses Donis MD,, MD, F.A.C.E.P.   Attending Emergency Physician        Moses Donis MD  01/13/22 0751

## 2022-02-07 ENCOUNTER — HOSPITAL ENCOUNTER (OUTPATIENT)
Facility: MEDICAL CENTER | Age: 52
End: 2022-02-07

## 2022-02-08 ENCOUNTER — HOSPITAL ENCOUNTER (OUTPATIENT)
Dept: GENERAL RADIOLOGY | Facility: CLINIC | Age: 52
Discharge: HOME | End: 2022-02-10
Payer: COMMERCIAL

## 2022-02-08 DIAGNOSIS — J45.909 ASTHMA, UNSPECIFIED ASTHMA SEVERITY, UNSPECIFIED WHETHER COMPLICATED, UNSPECIFIED WHETHER PERSISTENT: ICD-10-CM

## 2022-02-08 PROCEDURE — 71046 X-RAY EXAM CHEST 2 VIEWS: CPT

## 2022-02-09 ENCOUNTER — TELEPHONE (OUTPATIENT)
Dept: FAMILY MEDICINE CLINIC | Age: 52
End: 2022-02-09

## 2022-02-10 ENCOUNTER — TELEPHONE (OUTPATIENT)
Dept: FAMILY MEDICINE CLINIC | Age: 52
End: 2022-02-10

## 2022-02-10 NOTE — TELEPHONE ENCOUNTER
Pt called in stating that she needs some blood work done. She states that she had covid in [place] and she just wants t labs checked she stated  she has been feeling under the weather.

## 2022-02-11 ENCOUNTER — HOSPITAL ENCOUNTER (OUTPATIENT)
Facility: MEDICAL CENTER | Age: 52
End: 2022-02-11

## 2022-02-14 NOTE — TELEPHONE ENCOUNTER
Patient states basic labs, CBC, CMP and she also wants covid antibody test done and any other labs that need to done.

## 2022-02-15 ENCOUNTER — HOSPITAL ENCOUNTER (OUTPATIENT)
Facility: CLINIC | Age: 52
Discharge: HOME | End: 2022-02-15
Payer: COMMERCIAL

## 2022-02-15 DIAGNOSIS — K74.69 OTHER CIRRHOSIS OF LIVER (HCC): ICD-10-CM

## 2022-02-15 DIAGNOSIS — R16.1 SPLENOMEGALY: ICD-10-CM

## 2022-02-15 PROCEDURE — 86146 BETA-2 GLYCOPROTEIN ANTIBODY: CPT

## 2022-02-15 PROCEDURE — 36415 COLL VENOUS BLD VENIPUNCTURE: CPT

## 2022-02-15 PROCEDURE — 85610 PROTHROMBIN TIME: CPT

## 2022-02-15 PROCEDURE — 85613 RUSSELL VIPER VENOM DILUTED: CPT

## 2022-02-15 PROCEDURE — 86147 CARDIOLIPIN ANTIBODY EA IG: CPT

## 2022-02-15 PROCEDURE — 80076 HEPATIC FUNCTION PANEL: CPT

## 2022-02-15 PROCEDURE — 85730 THROMBOPLASTIN TIME PARTIAL: CPT

## 2022-02-16 LAB
ALBUMIN SERPL-MCNC: 4.2 G/DL (ref 3.5–5.2)
ALBUMIN/GLOBULIN RATIO: 1.7 (ref 1–2.5)
ALP BLD-CCNC: 68 U/L (ref 35–104)
ALT SERPL-CCNC: 16 U/L (ref 5–33)
AST SERPL-CCNC: 15 U/L
BILIRUB SERPL-MCNC: 0.47 MG/DL (ref 0.3–1.2)
BILIRUBIN DIRECT: 0.13 MG/DL
BILIRUBIN, INDIRECT: 0.34 MG/DL (ref 0–1)
GLOBULIN: NORMAL G/DL (ref 1.5–3.8)
TOTAL PROTEIN: 6.7 G/DL (ref 6.4–8.3)

## 2022-02-17 LAB
BETA 2 GLYCOPROT.1 IGG AB: 1.1 ELISA U/ML (ref 0–7)
BETA 2 GLYCOPROT.1 IGM AB: 71 ELISA U/ML (ref 0–7)

## 2022-02-18 ENCOUNTER — TELEPHONE (OUTPATIENT)
Dept: ONCOLOGY | Age: 52
End: 2022-02-18

## 2022-02-18 ENCOUNTER — OFFICE VISIT (OUTPATIENT)
Dept: ONCOLOGY | Age: 52
End: 2022-02-18
Payer: COMMERCIAL

## 2022-02-18 VITALS — BODY MASS INDEX: 28.17 KG/M2 | WEIGHT: 159 LBS

## 2022-02-18 DIAGNOSIS — R16.1 SPLENOMEGALY: ICD-10-CM

## 2022-02-18 DIAGNOSIS — F41.9 ANXIETY: Primary | ICD-10-CM

## 2022-02-18 DIAGNOSIS — F17.200 SMOKER: ICD-10-CM

## 2022-02-18 DIAGNOSIS — K74.69 OTHER CIRRHOSIS OF LIVER (HCC): ICD-10-CM

## 2022-02-18 LAB
ANTICARDIOLIPIN IGA ANTIBODY: 2.1 APL (ref 0–14)
ANTICARDIOLIPIN IGG ANTIBODY: 2.9 GPL (ref 0–10)
CARDIOLIPIN AB IGM: 17 MPL (ref 0–10)
DILUTE RUSSELL VIPER VENOM TIME: ABNORMAL
INR BLD: 0.9
LUPUS ANTICOAG: ABNORMAL
PARTIAL THROMBOPLASTIN TIME: 22.2 SEC (ref 21.3–31.3)
PROTHROMBIN TIME: 10 SEC (ref 9.4–12.6)

## 2022-02-18 PROCEDURE — 99211 OFF/OP EST MAY X REQ PHY/QHP: CPT

## 2022-02-18 PROCEDURE — 99214 OFFICE O/P EST MOD 30 MIN: CPT | Performed by: INTERNAL MEDICINE

## 2022-02-18 RX ORDER — NICOTINE 21 MG/24HR
1 PATCH, TRANSDERMAL 24 HOURS TRANSDERMAL EVERY 24 HOURS
Qty: 30 PATCH | Refills: 3 | Status: SHIPPED | OUTPATIENT
Start: 2022-02-18 | End: 2023-02-18

## 2022-02-18 RX ORDER — LORAZEPAM 1 MG/1
1 TABLET ORAL EVERY 6 HOURS PRN
Qty: 30 TABLET | Refills: 0 | Status: SHIPPED | OUTPATIENT
Start: 2022-02-18 | End: 2022-02-18

## 2022-02-18 RX ORDER — LORAZEPAM 1 MG/1
TABLET ORAL
Qty: 45 TABLET | Refills: 0 | Status: SHIPPED | OUTPATIENT
Start: 2022-02-18 | End: 2022-03-20

## 2022-02-18 NOTE — TELEPHONE ENCOUNTER
[facility] CALLING FOR SCRIPT CLARIFICATION AND NEW SCRIPT SENT PER MD FOR 1 TAB PRN Q 6 HOURS AND SPOKE WITH STAFF AT [facility] AND NEW SCRIPT WITH CLEAR INSTRUCTIONS RECEIVED

## 2022-02-18 NOTE — TELEPHONE ENCOUNTER
PUALY ARRIVES FOR DOXY VISIT  DR UNDERWOOD LOGS ON  ORDERS RECEIVED  RV 6 MONTHS  NEED CDP CMP CARDIOLIPIN AB IGG IGM IGA  STEVENSON-2 GLYCO PROTEIN 1 AB IGG IGM DRVVT-LUPUS LIKE ANTICOAG LUPUS ANTICOAGULANT  LABS TO BE DONE 8/12/22, ORDERS MAILED TO PATIENT  MD VISIT 08/19/22 @3:45PM  SCRIPTS SENT TO PATIENT'S PHARMACY  AVS PRINTED AND MAILED TO PATIENT WITH INSTRUCTIONS  PATIENT DISCHARGED

## 2022-02-18 NOTE — PROGRESS NOTES
DIAGNOSIS:   1. Splenomegaly with live cirrhosis   2. MOLINA  3. Positive anticardiolipin antibody and antibeta glycoprotein antibody but no evidence of thromboembolism    CURRENT THERAPY:  Plan for weight loss and repeat imaging     BRIEF CASE HISTORY:   Esme Loyola is a very pleasant 49 y.o. female who is referred to us for splenomegaly with liver cirrhosis. She recently has COVID testing and was negative. She had CT 11/2020 that showed splenomegaly and some liver changes. She was diagnosed with diabetes 5 years ago and was started on insulin and has since stopped. She has history of fungal infection in the lungs and was treated with steroids. She was found to have mass on adrenal gland that has been monitored, during the initial evaluation and notes she was informed of cirrhosis of the liver. Her imaging history shows slow enlargement of the spleen. She has consulted with GI locally and with [facility], it was felt to possibly be metabolic disorder. She has no history of significant alcohol consumption. She has been working to lose weight and has lost about 5 pounds, difficult with insulin resistance. The patient was seen by rheumatology and she has HLA-B27 testing positive. She also had further work-up that showed positive anti beta 2 glycoprotein antibody and positive for anticardiolipin IgM. The patient never had any thromboembolism. INTERIM HISTORY: This is a virtual visit. Patient seems to be doing okay, no thromboembolism. However, she recently got COVID and received dexamethasone. That led to uncontrolled diabetes and weight gain. She got stressed out and went back to smoking. Very anxious but otherwise feeling clinically okay. She is back on insulin.     PAST MEDICAL HISTORY: has a past medical history of Anomalous coronary artery origin, Anxiety, Asthma, Back pain, DDD (degenerative disc disease), cervical, DDD (degenerative disc disease), lumbar, Diabetes mellitus ([address] Fatty liver, Mitral prolapse, Pneumonia, Thoracic outlet syndrome, and Thyroid goiter. PAST SURGICAL HISTORY: has a past surgical history that includes Tubal ligation;  section; Cervical spine surgery; hernia repair; Colonoscopy; Endoscopy, colon, diagnostic; bronchoscopy; Cardiac surgery; Tonsillectomy; pr sono guide needle biopsy; and Finger trigger release (Right, 10/9/2019). CURRENT MEDICATIONS:  has a current medication list which includes the following prescription(s): albuterol sulfate hfa, ibuprofen, januvia, rosuvastatin, mometasone-formoterol, hydrocortisone, ketoconazole, pantoprazole sodium, metoprolol succinate, ranolazine, insulin glargine, truetest test, trueplus lancets 33g, albuterol, meloxicam, and nitroglycerin, and the following Facility-Administered Medications: nitroglycerin and sodium chloride. ALLERGIES:  is allergic to bactrim [sulfamethoxazole-trimethoprim], biaxin [clarithromycin], ceclor [cefaclor], and vancomycin. FAMILY HISTORY: Negative for any hematological or oncological conditions. SOCIAL HISTORY:  reports that she has been smoking cigarettes. She has a 10.00 pack-year smoking history. She has never used smokeless tobacco. She reports that she does not drink alcohol and does not use drugs. REVIEW OF SYSTEMS:   General: No fever or night sweats. Weight loss - intentional  ENT: No double or blurred vision, no tinnitus or hearing problem, no dysphagia or sore throat   Respiratory: No chest pain, no shortness of breath, no cough or hemoptysis. Cardiovascular: Denies chest pain, PND or orthopnea. No L E swelling or palpitations. Gastrointestinal: No nausea or vomiting, abdominal pain, diarrhea or constipation. Genitourinary: Denies dysuria, hematuria, frequency, urgency or incontinence. Neurological: Denies headaches, decreased LOC, no sensory or motor focal deficits. Musculoskeletal: No arthralgia no back pain or joint swelling.    Skin: There are no rashes or bleeding. Psychiatric: No anxiety, no depression. Endocrine: No diabetes or thyroid disease. Hematologic: No bleeding, no adenopathy. PHYSICAL EXAM: Shows a well appearing 49y.o.-year-old female who is not in pain or distress. Vital Signs: Weight 159 lb (72.1 kg), last menstrual period 07/19/2019, not currently breastfeeding. PHYSICAL EXAMINATION:    Vital Signs: (As obtained by patient/caregiver or practitioner observation)    Blood pressure-  Heart rate-    Respiratory rate-    Temperature-  Pulse oximetry-     Constitutional: [x] Appears well-developed and well-nourished [x] No apparent distress      [] Abnormal-   Mental status  [x] Alert and awake  [x] Oriented to person/place/time [x]Able to follow commands      Eyes:  EOM    [x]  Normal  [] Abnormal-  Sclera  [x]  Normal  [] Abnormal -         Discharge [x]  None visible  [] Abnormal -    HENT:   [x] Normocephalic, atraumatic.   [] Abnormal   [x] Mouth/Throat: Mucous membranes are moist.     External Ears [x] Normal  [] Abnormal-     Neck: [x] No visualized mass     Pulmonary/Chest: [x] Respiratory effort normal.  [x] No visualized signs of difficulty breathing or respiratory distress        [] Abnormal-      Musculoskeletal:   [x] Normal gait with no signs of ataxia         [x] Normal range of motion of neck        [] Abnormal-       Neurological:        [x] No Facial Asymmetry (Cranial nerve 7 motor function) (limited exam to video visit)          [x] No gaze palsy        [] Abnormal-         Skin:        [x] No significant exanthematous lesions or discoloration noted on facial skin         [] Abnormal-            Psychiatric:       [x] Normal Affect [x] No Hallucinations        [] Abnormal-     Other pertinent observable physical exam findings-                          REVIEW OF LABORATORY DATA:   Lab Results   Component Value Date    WBC 7.9 12/23/2021    HGB 13.4 12/23/2021    HCT 40.3 12/23/2021    MCV 86.1 12/23/2021     12/23/2021 Chemistry        Component Value Date/Time     (L) 01/12/2022 2110    K 3.9 01/12/2022 2110    CL 95 (L) 01/12/2022 2110    CO2 24 01/12/2022 2110    BUN 14 01/12/2022 2110    CREATININE 0.70 01/12/2022 2110        Component Value Date/Time    CALCIUM 9.8 01/12/2022 2110    ALKPHOS 68 02/15/2022 1456    AST 15 02/15/2022 1456    ALT 16 02/15/2022 1456    BILITOT 0.47 02/15/2022 1456            REVIEW OF RADIOLOGICAL RESULTS:   CT 11/10/2020  Impression    *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  No acute process is seen. *Cirrhotic liver with splenomegaly. CT 6/2021  Impression   *Cirrhotic liver with splenomegaly.  No liver mass identified on the single   phase technique.  Complete liver evaluation is needed, CT or MRI utilizing   liver mass protocol is recommended. IMPRESSION:   1. Splenomegaly with liver cirrhosis  2. MOLINA   3. 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 No clinical thromboembolism. PLAN:   1. I had a long discussion with the patient, explaining the findings. We went over her labs. 2. I think the positive antibody are part of her autoimmune condition. In absence of clinical thromboembolism, diagnosis of antiphospholipid syndrome cannot be made. 3. The patient is concerned about prophylaxis or protection against it. .  We talked about using low-dose aspirin. I recommended against it in light of recent data   4. We will also do a functional study with lupus anticoagulant and DRV VT.  5. With those positive antibody, if the patient develops thromboembolism, she will need lifelong anticoagulation and she understands. 6. The patient is having a lot of anxiety and she is back to smoking. We talked about using the nicotine patches and we will prescribe short course of Ativan to help with anxiety.   7. We will see her back in 6 months for follow-up      Attestation   The patient  being evaluated by a Virtual Visit (video visit) encounter to address concerns as mentioned above. A caregiver was present when appropriate. Due to this being a TeleHealth encounter (During NEHTV-81 public health emergency), evaluation of the following organ systems was limited: Vitals/Constitutional/EENT/Resp/CV/GI//MS/Neuro/Skin/Heme-Lymph-Imm. Pursuant to the emergency declaration under the [address] [place] [address] and the [employer] Act, this Virtual Visit was conducted with patient's (and/or legal guardian's) consent, to reduce the patient's risk of exposure to COVID-19 and provide necessary medical care. The patient (and/or legal guardian) has also been advised to contact this office for worsening conditions or problems, and seek emergency medical treatment and/or call 911 if deemed necessary. Services were provided through a video synchronous discussion virtually to substitute for in-person clinic visit. Patient and provider were located at their individual homes. --Torie Dennison MD on 4/6/2020 at 3:50 PM    An electronic signature was used to authenticate this note.

## 2022-02-28 DIAGNOSIS — R09.1 PLEURISY: ICD-10-CM

## 2022-02-28 RX ORDER — CYCLOBENZAPRINE HCL 5 MG
5 TABLET ORAL 2 TIMES DAILY PRN
Qty: 30 TABLET | Refills: 0 | Status: SHIPPED | OUTPATIENT
Start: 2022-02-28 | End: 2022-03-10

## 2022-02-28 RX ORDER — IBUPROFEN 800 MG/1
800 TABLET ORAL EVERY 8 HOURS PRN
Qty: 30 TABLET | Refills: 1 | Status: SHIPPED | OUTPATIENT
Start: 2022-02-28 | End: 2023-02-28

## 2022-02-28 NOTE — TELEPHONE ENCOUNTER
----- Message from Rob Hylton sent at 2/25/2022  4:39 PM EST -----  Subject: Message to Provider    QUESTIONS  Information for Provider? patient has back pain and is requesting   ibprophen 800 and flexeril to be called in for her. She would like a call   from the office  ---------------------------------------------------------------------------  --------------  [address]  What is the best way for the office to contact you? OK to leave message on   voicemail  Preferred Call Back Phone Number? [phone]  ---------------------------------------------------------------------------  --------------  SCRIPT ANSWERS  Relationship to Patient?  Self

## 2022-02-28 NOTE — TELEPHONE ENCOUNTER
----- Message from Ekta Moss sent at 2/25/2022  4:39 PM EST -----  Subject: Message to Provider    QUESTIONS  Information for Provider? patient has back pain and is requesting   ibprophen 800 and flexeril to be called in for her. She would like a call   from the office  ---------------------------------------------------------------------------  --------------  [address]  What is the best way for the office to contact you? OK to leave message on   voicemail  Preferred Call Back Phone Number? [phone]  ---------------------------------------------------------------------------  --------------  SCRIPT ANSWERS  Relationship to Patient?  Self

## 2022-03-01 ENCOUNTER — HOSPITAL ENCOUNTER (OUTPATIENT)
Facility: CLINIC | Age: 52
Discharge: HOME | End: 2022-03-01
Payer: COMMERCIAL

## 2022-03-01 PROCEDURE — 82947 ASSAY GLUCOSE BLOOD QUANT: CPT

## 2022-03-01 PROCEDURE — 86341 ISLET CELL ANTIBODY: CPT

## 2022-03-01 PROCEDURE — 84681 ASSAY OF C-PEPTIDE: CPT

## 2022-03-01 PROCEDURE — 36415 COLL VENOUS BLD VENIPUNCTURE: CPT

## 2022-03-02 LAB
C-PEPTIDE: 3.7 NG/ML (ref 1.1–4.4)
GLUCOSE FASTING: 150 MG/DL (ref 70–99)

## 2022-03-04 LAB — ISLET CELL ANTIBODY: NORMAL

## 2022-03-05 LAB — GLUTAMIC ACID DECARB AB: <5 IU/ML (ref 0–5)

## 2022-03-16 ENCOUNTER — NURSE ONLY (OUTPATIENT)
Dept: FAMILY MEDICINE CLINIC | Age: 52
End: 2022-03-16
Payer: COMMERCIAL

## 2022-03-16 DIAGNOSIS — Z23 NEED FOR HEPATITIS B BOOSTER VACCINATION: Primary | ICD-10-CM

## 2022-03-16 PROCEDURE — 90746 HEPB VACCINE 3 DOSE ADULT IM: CPT | Performed by: FAMILY MEDICINE

## 2022-03-16 PROCEDURE — 90471 IMMUNIZATION ADMIN: CPT | Performed by: FAMILY MEDICINE

## 2022-04-06 ENCOUNTER — HOSPITAL ENCOUNTER (OUTPATIENT)
Dept: MRI IMAGING | Facility: CLINIC | Age: 52
Discharge: HOME | End: 2022-04-08
Payer: COMMERCIAL

## 2022-04-06 DIAGNOSIS — Z98.1 S/P CERVICAL SPINAL FUSION: ICD-10-CM

## 2022-04-06 DIAGNOSIS — M50.20 HERNIATED CERVICAL DISC: ICD-10-CM

## 2022-04-06 DIAGNOSIS — M54.2 NECK PAIN: ICD-10-CM

## 2022-04-06 DIAGNOSIS — M48.02 SPINAL STENOSIS OF CERVICAL REGION: ICD-10-CM

## 2022-04-06 PROCEDURE — 72141 MRI NECK SPINE W/O DYE: CPT

## 2022-04-12 ENCOUNTER — HOSPITAL ENCOUNTER (OUTPATIENT)
Facility: CLINIC | Age: 52
Discharge: HOME | End: 2022-04-12
Payer: COMMERCIAL

## 2022-04-12 LAB
-: ABNORMAL
ABSOLUTE EOS #: 0.19 K/UL (ref 0–0.44)
ABSOLUTE IMMATURE GRANULOCYTE: 0.03 K/UL (ref 0–0.3)
ABSOLUTE LYMPH #: 2.15 K/UL (ref 1.1–3.7)
ABSOLUTE MONO #: 0.43 K/UL (ref 0.1–1.2)
AFP: 4.3 UG/L
ALBUMIN SERPL-MCNC: 4.4 G/DL (ref 3.5–5.2)
ALBUMIN/GLOBULIN RATIO: 1.8 (ref 1–2.5)
ALP BLD-CCNC: 67 U/L (ref 35–104)
ALT SERPL-CCNC: 13 U/L (ref 5–33)
ANION GAP SERPL CALCULATED.3IONS-SCNC: 13 MMOL/L (ref 9–17)
ANION GAP SERPL CALCULATED.3IONS-SCNC: 13 MMOL/L (ref 9–17)
AST SERPL-CCNC: 15 U/L
BACTERIA: ABNORMAL
BASOPHILS # BLD: 1 % (ref 0–2)
BASOPHILS ABSOLUTE: 0.05 K/UL (ref 0–0.2)
BILIRUB SERPL-MCNC: 0.33 MG/DL (ref 0.3–1.2)
BILIRUBIN DIRECT: 0.09 MG/DL
BILIRUBIN URINE: NEGATIVE
BILIRUBIN, INDIRECT: 0.24 MG/DL (ref 0–1)
BUN BLDV-MCNC: 8 MG/DL (ref 6–20)
BUN BLDV-MCNC: 8 MG/DL (ref 6–20)
CALCIUM SERPL-MCNC: 9.3 MG/DL (ref 8.6–10.4)
CALCIUM SERPL-MCNC: 9.3 MG/DL (ref 8.6–10.4)
CHLORIDE BLD-SCNC: 104 MMOL/L (ref 98–107)
CHLORIDE BLD-SCNC: 105 MMOL/L (ref 98–107)
CO2: 22 MMOL/L (ref 20–31)
CO2: 23 MMOL/L (ref 20–31)
COLOR: ABNORMAL
CREAT SERPL-MCNC: 0.83 MG/DL (ref 0.5–0.9)
CREAT SERPL-MCNC: 0.85 MG/DL (ref 0.5–0.9)
CREATININE URINE: 218.9 MG/DL (ref 28–217)
CRYSTALS, UA: ABNORMAL /HPF
CRYSTALS, UA: ABNORMAL /HPF
EOSINOPHILS ABSOLUTE: 188 /UL (ref 200–400)
EOSINOPHILS RELATIVE PERCENT: 3 % (ref 1–4)
EPITHELIAL CELLS UA: ABNORMAL /HPF (ref 0–5)
GFR AFRICAN AMERICAN: >60 ML/MIN
GFR AFRICAN AMERICAN: >60 ML/MIN
GFR NON-AFRICAN AMERICAN: >60 ML/MIN
GFR NON-AFRICAN AMERICAN: >60 ML/MIN
GFR SERPL CREATININE-BSD FRML MDRD: ABNORMAL ML/MIN/{1.73_M2}
GFR SERPL CREATININE-BSD FRML MDRD: ABNORMAL ML/MIN/{1.73_M2}
GLUCOSE BLD-MCNC: 195 MG/DL (ref 70–99)
GLUCOSE BLD-MCNC: 200 MG/DL (ref 70–99)
GLUCOSE URINE: ABNORMAL
HCT VFR BLD CALC: 38.2 % (ref 36.3–47.1)
HCT VFR BLD CALC: 38.2 % (ref 36.3–47.1)
HEMOGLOBIN: 13.4 G/DL (ref 11.9–15.1)
HEMOGLOBIN: 13.4 G/DL (ref 11.9–15.1)
IMMATURE GRANULOCYTES: 0 %
INR BLD: 1
KETONES, URINE: ABNORMAL
LEUKOCYTE ESTERASE, URINE: NEGATIVE
LYMPHOCYTES # BLD: 32 % (ref 24–43)
MCH RBC QN AUTO: 29.6 PG (ref 25.2–33.5)
MCH RBC QN AUTO: 29.6 PG (ref 25.2–33.5)
MCHC RBC AUTO-ENTMCNC: 35.1 G/DL (ref 28.4–34.8)
MCHC RBC AUTO-ENTMCNC: 35.1 G/DL (ref 28.4–34.8)
MCV RBC AUTO: 84.3 FL (ref 82.6–102.9)
MCV RBC AUTO: 84.3 FL (ref 82.6–102.9)
MICROALBUMIN/CREAT 24H UR: 19 MG/L
MICROALBUMIN/CREAT UR-RTO: 9 MCG/MG CREAT
MONOCYTES # BLD: 6 % (ref 3–12)
MUCUS: ABNORMAL
NITRITE, URINE: NEGATIVE
NRBC AUTOMATED: 0 PER 100 WBC
NRBC AUTOMATED: 0 PER 100 WBC
PDW BLD-RTO: 13.2 % (ref 11.8–14.4)
PDW BLD-RTO: 13.2 % (ref 11.8–14.4)
PH UA: 5 (ref 5–8)
PHOSPHORUS: 3.1 MG/DL (ref 2.6–4.5)
PLATELET # BLD: 153 K/UL (ref 138–453)
PLATELET # BLD: 153 K/UL (ref 138–453)
PMV BLD AUTO: 11.7 FL (ref 8.1–13.5)
PMV BLD AUTO: 11.7 FL (ref 8.1–13.5)
POTASSIUM SERPL-SCNC: 4 MMOL/L (ref 3.7–5.3)
POTASSIUM SERPL-SCNC: 4 MMOL/L (ref 3.7–5.3)
PROTEIN UA: ABNORMAL
PROTHROMBIN TIME: 10.2 SEC (ref 9.4–12.6)
PTH INTACT: 28.5 PG/ML (ref 15–65)
RBC # BLD: 4.53 M/UL (ref 3.95–5.11)
RBC # BLD: 4.53 M/UL (ref 3.95–5.11)
RBC UA: ABNORMAL /HPF (ref 0–2)
SEG NEUTROPHILS: 58 % (ref 36–65)
SEGMENTED NEUTROPHILS ABSOLUTE COUNT: 3.85 K/UL (ref 1.5–8.1)
SODIUM BLD-SCNC: 140 MMOL/L (ref 135–144)
SODIUM BLD-SCNC: 140 MMOL/L (ref 135–144)
SPECIFIC GRAVITY UA: 1.03 (ref 1–1.03)
TOTAL PROTEIN: 6.8 G/DL (ref 6.4–8.3)
TURBIDITY: ABNORMAL
URINE HGB: NEGATIVE
UROBILINOGEN, URINE: NORMAL
VITAMIN D 25-HYDROXY: 29.1 NG/ML
WBC # BLD: 6.7 K/UL (ref 3.5–11.3)
WBC # BLD: 6.7 K/UL (ref 3.5–11.3)
WBC UA: ABNORMAL /HPF (ref 0–5)

## 2022-04-12 PROCEDURE — 83970 ASSAY OF PARATHORMONE: CPT

## 2022-04-12 PROCEDURE — 82570 ASSAY OF URINE CREATININE: CPT

## 2022-04-12 PROCEDURE — 80048 BASIC METABOLIC PNL TOTAL CA: CPT

## 2022-04-12 PROCEDURE — 85027 COMPLETE CBC AUTOMATED: CPT

## 2022-04-12 PROCEDURE — 85048 AUTOMATED LEUKOCYTE COUNT: CPT

## 2022-04-12 PROCEDURE — 82306 VITAMIN D 25 HYDROXY: CPT

## 2022-04-12 PROCEDURE — 85025 COMPLETE CBC W/AUTO DIFF WBC: CPT

## 2022-04-12 PROCEDURE — 82105 ALPHA-FETOPROTEIN SERUM: CPT

## 2022-04-12 PROCEDURE — 80076 HEPATIC FUNCTION PANEL: CPT

## 2022-04-12 PROCEDURE — 82043 UR ALBUMIN QUANTITATIVE: CPT

## 2022-04-12 PROCEDURE — 36415 COLL VENOUS BLD VENIPUNCTURE: CPT

## 2022-04-12 PROCEDURE — 85610 PROTHROMBIN TIME: CPT

## 2022-04-12 PROCEDURE — 81001 URINALYSIS AUTO W/SCOPE: CPT

## 2022-04-12 PROCEDURE — 84100 ASSAY OF PHOSPHORUS: CPT

## 2022-04-15 ENCOUNTER — TELEPHONE (OUTPATIENT)
Dept: ONCOLOGY | Age: 52
End: 2022-04-15

## 2022-04-15 NOTE — TELEPHONE ENCOUNTER
ON 4/14/22 PT CALLING TO ASK DR PRABHAKAR IF LABS DRAWN PER UROLOGY, KYLER MOYER, COULD BE R/T \"HER OTHER ISSUES\"    NOTE TO MD FOR ADVICE. ON 4/15/22 DR PRABHAKAR WRITES ON NOTE \"I CALLED PAULY AND REVIEWED THE LABS WITH HER\".

## 2022-05-02 ENCOUNTER — OFFICE VISIT (OUTPATIENT)
Dept: FAMILY MEDICINE CLINIC | Age: 52
End: 2022-05-02
Payer: COMMERCIAL

## 2022-05-02 VITALS
DIASTOLIC BLOOD PRESSURE: 84 MMHG | BODY MASS INDEX: 28.95 KG/M2 | WEIGHT: 163.4 LBS | TEMPERATURE: 97.5 F | SYSTOLIC BLOOD PRESSURE: 133 MMHG | HEART RATE: 72 BPM | OXYGEN SATURATION: 99 %

## 2022-05-02 DIAGNOSIS — L30.9 DERMATITIS: Primary | ICD-10-CM

## 2022-05-02 PROCEDURE — 99213 OFFICE O/P EST LOW 20 MIN: CPT | Performed by: FAMILY MEDICINE

## 2022-05-02 RX ORDER — CLOBETASOL PROPIONATE 0.5 MG/G
CREAM TOPICAL
Qty: 45 G | Refills: 1 | Status: SHIPPED | OUTPATIENT
Start: 2022-05-02

## 2022-05-02 RX ORDER — HYDROXYZINE 50 MG/1
50 TABLET, FILM COATED ORAL NIGHTLY
Qty: 30 TABLET | Refills: 0 | Status: SHIPPED | OUTPATIENT
Start: 2022-05-02 | End: 2022-05-19 | Stop reason: SDUPTHER

## 2022-05-02 NOTE — PROGRESS NOTES
General FM note    Baljeet Hansen is a 49 y.o. female who presents today for follow up on her  medical conditions as noted below. Baljeet Hansen is c/o of   Chief Complaint   Patient presents with    Rash     neck    Scar     irritation       Patient Active Problem List:     Fatigue     GERD (gastroesophageal reflux disease)     Obesity (BMI 30-39. 9)     Diabetes mellitus (HCC)     Snoring     Elevated liver enzymes     Unstable angina (HCC)     Positive cardiac stress test     Anomalous coronary artery origin     Trigger thumb of right hand     Past Medical History:   Diagnosis Date    Anomalous coronary artery origin 2016    Anxiety     Asthma     Back pain     DDD (degenerative disc disease), cervical     DDD (degenerative disc disease), lumbar     Diabetes mellitus [address] [address]     Fatty liver     Mitral prolapse     Pneumonia     Thoracic outlet syndrome     Thyroid goiter     had biopsy      Past Surgical History:   Procedure Laterality Date    BRONCHOSCOPY      CARDIAC SURGERY      x 1    CERVICAL SPINE SURGERY      plates placed     SECTION      x 2    COLONOSCOPY      ENDOSCOPY, COLON, DIAGNOSTIC      EGD    FINGER TRIGGER RELEASE Right 10/9/2019    THUMB TRIGGER RELEASE performed by Jose M Whittaker MD at [address]      abdomen    ID SONO GUIDE NEEDLE BIOPSY      TONSILLECTOMY      TUBAL LIGATION       Family History   Problem Relation Age of Onset    Diabetes Mother     Diabetes Paternal Grandmother      Current Outpatient Medications   Medication Sig Dispense Refill    hydrOXYzine (ATARAX) 50 MG tablet Take 1 tablet by mouth nightly 30 tablet 0    clobetasol (TEMOVATE) 0.05 % cream Apply topically 2 times daily.  45 g 1    ibuprofen (ADVIL;MOTRIN) 800 MG tablet Take 1 tablet by mouth every 8 hours as needed for Pain 30 tablet 1    nicotine (NICODERM CQ) 14 MG/24HR Place 1 patch onto the skin every 24 hours 30 patch 3    albuterol sulfate HFA (VENTOLIN HFA) 108 (90 Base) MCG/ACT inhaler Inhale 2 puffs into the lungs every 6 hours as needed for Wheezing 1 each 1    JANUVIA 100 MG tablet TAKE 1 TABLET BY MOUTH EVERY DAY      rosuvastatin (CRESTOR) 10 MG tablet TAKE 1 TABLET BY MOUTH EVERY DAY      mometasone-formoterol (DULERA) 100-5 MCG/ACT inhaler Inhale 2 puffs into the lungs 2 times daily Rinse mouth after using. 1 Inhaler 2    hydrocortisone 2.5 % cream Apply once daily to corners of mouth 60 g 5    ketoconazole (NIZORAL) 2 % cream Apply once daily to corners of mouth 30 g 2    pantoprazole sodium (PROTONIX) 40 MG PACK packet Take 40 mg by mouth daily       metoprolol succinate (TOPROL XL) 25 MG extended release tablet Take 2 tablets by mouth daily (Patient taking differently: Take 50 mg by mouth daily 50 mg in the morning, 25 mg at night) 60 tablet 3    ranolazine (RANEXA) 1000 MG extended release tablet Take 1,000 mg by mouth      insulin glargine (TOUJEO SOLOSTAR) 300 UNIT/ML injection pen Inject 23 Units into the skin daily 16 units BID      nitroGLYCERIN (NITROSTAT) 0.4 MG SL tablet Place 0.4 mg under the tongue every 5 minutes as needed for Chest pain Dissolve 1 tab under tongue at first sign of chest pain. May repeat every 5 minutes until relief is obtained. If pain persists after taking 3 tabs in a 15-minute period, or the pain is different than is typically experienced, call 9-1-1 immediately.  TRUETEST TEST strip   0    TRUEPLUS LANCETS 33G MISC   0    albuterol (PROVENTIL) (2.5 MG/3ML) 0.083% nebulizer solution Take 3 mLs by nebulization every 6 hours as needed for Wheezing 120 each 3    meloxicam (MOBIC) 15 MG tablet Take 1 tablet by mouth daily (Patient not taking: Reported on 2/18/2022) 30 tablet 0     No current facility-administered medications for this visit.      Facility-Administered Medications Ordered in Other Visits   Medication Dose Route Frequency Provider Last Rate Last Admin    nitroGLYCERIN (NITROSTAT) SL tablet 0.4 mg  0.4 mg SubLINGual Q5 Min PRN Lilia Rose MD   0.4 mg at 09/16/16 0857    0.9 % sodium chloride infusion   IntraVENous Continuous Lilia Rose MD 20 mL/hr at 09/16/16 0857 New Bag at 09/16/16 0857     ALLERGIES:    Allergies   Allergen Reactions    Bactrim [Sulfamethoxazole-Trimethoprim]     Biaxin [Clarithromycin]     Ceclor [Cefaclor]     Vancomycin      Itching, burning       Social History     Tobacco Use    Smoking status: Current Every Day Smoker     Packs/day: 0.50     Years: 20.00     Pack years: 10.00     Types: Cigarettes    Smokeless tobacco: Never Used   Substance Use Topics    Alcohol use: No     Alcohol/week: 0.0 standard drinks      Body mass index is 28.95 kg/m². /84   Pulse 72   Temp 97.5 °F (36.4 °C)   Wt 163 lb 6.4 oz (74.1 kg)   LMP 07/19/2019   SpO2 99%   BMI 28.95 kg/m²     Subjective:      HPI    49 y.o. female coming in today because of a rash around her neck area. 3 days. Patient feels this could be related to her cats but then she states it is very itchy and she actually scratches and at night. She never had similar rash before. And she does not have any rash anywhere else besides in the left neck area. She also states that her scar at her laparotomy which has been used years ago rubbed on her jeans and she has slight little irritation at her lower area of the scar which she wanted me to look in. Has Fu with spec - endo, and pulmo. Review of Systems   Constitutional: Negative for fever and unexpected weight change. Pertinent items are noted in HPI. Objective:   Physical Exam  Constitutional: VS (see above). General appearance: normal development, habitus and attention, no deformities. No distress. Eyes: normal conjunctiva and lids. Physical Exam  HENT:      Head:        Comments: Irritated slightly red dry skin present. Also superficial laceration seen.   Abdominal:          Comments: Slight dry scalp present slight redness but no drainage no puffiness. Skin: no rashes, lesions or ulcers. Musculoskeletal: normal gait. Nails: no clubbing or cyanosis. Psychiatric: alert and oriented to place, time and person. Normal mood and affect. Assessment:       Diagnosis Orders   1. Dermatitis  hydrOXYzine (ATARAX) 50 MG tablet    clobetasol (TEMOVATE) 0.05 % cream       Plan:   Use the topical medication. Use Atarax for the pruritus at night. Call for any changes. Continue to follow-up with specialist.    Return in about 6 months (around 11/2/2022), or if symptoms worsen or fail to improve. No orders of the defined types were placed in this encounter. Orders Placed This Encounter   Medications    hydrOXYzine (ATARAX) 50 MG tablet     Sig: Take 1 tablet by mouth nightly     Dispense:  30 tablet     Refill:  0    clobetasol (TEMOVATE) 0.05 % cream     Sig: Apply topically 2 times daily. Dispense:  45 g     Refill:  1       Call or return to clinic prn if these symptoms worsen or fail to improve as anticipated. I have reviewed the instructions with the patient, answering all questions to patient's satisfaction. Lenora received counseling on the following healthy behaviors: nutrition, exercise, and medication adherence  Reviewed prior labs and health maintenance. Continue current medications, diet and exercise. Discussed use, benefit, and side effects of prescribed medications. Barriers to medication compliance addressed. Patient given educational materials - see patient instructions. All patient questions answered. Patient voiced understanding.       Electronically signed by Kaushik Clements MD on 5/2/2022 at 1:27 PM       (Please note that portions of this note were completed with a voice recognition program. Efforts were made to edit the dictations but occasionally words are mis-transcribed.)

## 2022-05-10 ENCOUNTER — HOSPITAL ENCOUNTER (OUTPATIENT)
Dept: MRI IMAGING | Facility: CLINIC | Age: 52
Discharge: HOME | End: 2022-05-12
Payer: COMMERCIAL

## 2022-05-10 DIAGNOSIS — M51.36 DDD (DEGENERATIVE DISC DISEASE), LUMBAR: ICD-10-CM

## 2022-05-10 DIAGNOSIS — M54.16 LUMBAR RADICULOPATHY: ICD-10-CM

## 2022-05-10 PROCEDURE — 72148 MRI LUMBAR SPINE W/O DYE: CPT

## 2022-05-19 DIAGNOSIS — L30.9 DERMATITIS: ICD-10-CM

## 2022-05-19 RX ORDER — HYDROXYZINE 50 MG/1
50 TABLET, FILM COATED ORAL NIGHTLY
Qty: 30 TABLET | Refills: 0 | Status: SHIPPED | OUTPATIENT
Start: 2022-05-19 | End: 2022-06-18

## 2022-05-19 NOTE — TELEPHONE ENCOUNTER
Bravo Bell is calling to request a refill on the following medication(s):    Last Visit Date (If Applicable):  6/0/5511    Next Visit Date:    Visit date not found    Medication Request:  Requested Prescriptions     Pending Prescriptions Disp Refills    hydrOXYzine (ATARAX) 50 MG tablet 30 tablet 0     Sig: Take 1 tablet by mouth nightly

## 2022-07-14 ENCOUNTER — HOSPITAL ENCOUNTER (OUTPATIENT)
Facility: CLINIC | Age: 52
Discharge: HOME | End: 2022-07-14
Payer: COMMERCIAL

## 2022-07-14 ENCOUNTER — HOSPITAL ENCOUNTER (OUTPATIENT)
Dept: ULTRASOUND IMAGING | Facility: CLINIC | Age: 52
Discharge: HOME | End: 2022-07-16
Payer: COMMERCIAL

## 2022-07-14 DIAGNOSIS — K74.60 HEPATIC CIRRHOSIS, UNSPECIFIED HEPATIC CIRRHOSIS TYPE, UNSPECIFIED WHETHER ASCITES PRESENT (HCC): ICD-10-CM

## 2022-07-14 LAB
AFP: 3.7 UG/L
ALBUMIN SERPL-MCNC: 4.5 G/DL (ref 3.5–5.2)
ALBUMIN/GLOBULIN RATIO: 1.9 (ref 1–2.5)
ALP BLD-CCNC: 63 U/L (ref 35–104)
ALT SERPL-CCNC: 16 U/L (ref 5–33)
ALT SERPL-CCNC: 17 U/L (ref 5–33)
ANION GAP SERPL CALCULATED.3IONS-SCNC: 11 MMOL/L (ref 9–17)
AST SERPL-CCNC: 13 U/L
AST SERPL-CCNC: 14 U/L
BILIRUB SERPL-MCNC: 0.48 MG/DL (ref 0.3–1.2)
BILIRUBIN DIRECT: 0.13 MG/DL
BILIRUBIN URINE: NEGATIVE
BILIRUBIN, INDIRECT: 0.35 MG/DL (ref 0–1)
BUN BLDV-MCNC: 11 MG/DL (ref 6–20)
CALCIUM SERPL-MCNC: 9.4 MG/DL (ref 8.6–10.4)
CHLORIDE BLD-SCNC: 103 MMOL/L (ref 98–107)
CHOLESTEROL, FASTING: 157 MG/DL
CHOLESTEROL/HDL RATIO: 2.6
CO2: 26 MMOL/L (ref 20–31)
COLOR: YELLOW
COMMENT UA: NORMAL
CREAT SERPL-MCNC: 0.83 MG/DL (ref 0.5–0.9)
FREE KAPPA/LAMBDA RATIO: 1.02 (ref 0.26–1.65)
GFR AFRICAN AMERICAN: >60 ML/MIN
GFR NON-AFRICAN AMERICAN: >60 ML/MIN
GFR SERPL CREATININE-BSD FRML MDRD: ABNORMAL ML/MIN/{1.73_M2}
GLUCOSE BLD-MCNC: 134 MG/DL (ref 70–99)
GLUCOSE URINE: NEGATIVE
HCT VFR BLD CALC: 40.2 % (ref 36.3–47.1)
HDLC SERPL-MCNC: 60 MG/DL
HEMOGLOBIN: 13.2 G/DL (ref 11.9–15.1)
INR BLD: 1
KAPPA FREE LIGHT CHAINS QNT: 1.86 MG/DL (ref 0.37–1.94)
KETONES, URINE: NEGATIVE
LAMBDA FREE LIGHT CHAINS QNT: 1.82 MG/DL (ref 0.57–2.63)
LDL CHOLESTEROL: 72 MG/DL (ref 0–130)
LEUKOCYTE ESTERASE, URINE: NEGATIVE
MCH RBC QN AUTO: 28.8 PG (ref 25.2–33.5)
MCHC RBC AUTO-ENTMCNC: 32.8 G/DL (ref 28.4–34.8)
MCV RBC AUTO: 87.8 FL (ref 82.6–102.9)
NITRITE, URINE: NEGATIVE
NRBC AUTOMATED: 0 PER 100 WBC
PDW BLD-RTO: 13.6 % (ref 11.8–14.4)
PH UA: 5.5 (ref 5–8)
PLATELET # BLD: 162 K/UL (ref 138–453)
PMV BLD AUTO: 11.2 FL (ref 8.1–13.5)
POTASSIUM SERPL-SCNC: 4.2 MMOL/L (ref 3.7–5.3)
PROTEIN UA: NEGATIVE
PROTHROMBIN TIME: 10.1 SEC (ref 9.4–12.6)
PTH INTACT: 31.38 PG/ML (ref 15–65)
RBC # BLD: 4.58 M/UL (ref 3.95–5.11)
SODIUM BLD-SCNC: 140 MMOL/L (ref 135–144)
SPECIFIC GRAVITY UA: 1.02 (ref 1–1.03)
TOTAL PROTEIN: 6.9 G/DL (ref 6.4–8.3)
TRIGLYCERIDE, FASTING: 125 MG/DL
TURBIDITY: CLEAR
URIC ACID: 3.6 MG/DL (ref 2.4–5.7)
URINE HGB: NEGATIVE
UROBILINOGEN, URINE: NORMAL
WBC # BLD: 8.4 K/UL (ref 3.5–11.3)

## 2022-07-14 PROCEDURE — 85027 COMPLETE CBC AUTOMATED: CPT

## 2022-07-14 PROCEDURE — 84450 TRANSFERASE (AST) (SGOT): CPT

## 2022-07-14 PROCEDURE — 86334 IMMUNOFIX E-PHORESIS SERUM: CPT

## 2022-07-14 PROCEDURE — 85730 THROMBOPLASTIN TIME PARTIAL: CPT

## 2022-07-14 PROCEDURE — 84460 ALANINE AMINO (ALT) (SGPT): CPT

## 2022-07-14 PROCEDURE — 83883 ASSAY NEPHELOMETRY NOT SPEC: CPT

## 2022-07-14 PROCEDURE — 82105 ALPHA-FETOPROTEIN SERUM: CPT

## 2022-07-14 PROCEDURE — 83970 ASSAY OF PARATHORMONE: CPT

## 2022-07-14 PROCEDURE — 80048 BASIC METABOLIC PNL TOTAL CA: CPT

## 2022-07-14 PROCEDURE — 36415 COLL VENOUS BLD VENIPUNCTURE: CPT

## 2022-07-14 PROCEDURE — 84520 ASSAY OF UREA NITROGEN: CPT

## 2022-07-14 PROCEDURE — 86146 BETA-2 GLYCOPROTEIN ANTIBODY: CPT

## 2022-07-14 PROCEDURE — 80061 LIPID PANEL: CPT

## 2022-07-14 PROCEDURE — 85613 RUSSELL VIPER VENOM DILUTED: CPT

## 2022-07-14 PROCEDURE — 82977 ASSAY OF GGT: CPT

## 2022-07-14 PROCEDURE — 85610 PROTHROMBIN TIME: CPT

## 2022-07-14 PROCEDURE — 81003 URINALYSIS AUTO W/O SCOPE: CPT

## 2022-07-14 PROCEDURE — 86147 CARDIOLIPIN ANTIBODY EA IG: CPT

## 2022-07-14 PROCEDURE — 76705 ECHO EXAM OF ABDOMEN: CPT

## 2022-07-14 PROCEDURE — 84550 ASSAY OF BLOOD/URIC ACID: CPT

## 2022-07-14 PROCEDURE — 80076 HEPATIC FUNCTION PANEL: CPT

## 2022-07-15 LAB — MAMMOGRAPHY, EXTERNAL: NORMAL

## 2022-07-18 LAB
ALANINE AMINOTRANSFERASE, FIBROMETER: 16 U/L (ref 5–40)
ALPHA-2-MACROGLOBULIN, FIBROMETER: 263 MG/DL (ref 131–293)
ASPARTATE AMINOTRANSFERASE, FIBROMETER: 16 U/L (ref 9–40)
BETA 2 GLYCOPROT.1 IGG AB: 1.3 ELISA U/ML (ref 0–7)
BETA 2 GLYCOPROT.1 IGM AB: 49 ELISA U/ML (ref 0–7)
CIRRHOMETER PATIENT SCORE: 0.03
EER FIBROMETER REPORT: NORMAL
FIBROMETER INTERPRETATION: NORMAL
FIBROMETER PATIENT SCORE: 0.34
FIBROMETER PLATELET COUNT: 162
FIBROMETER PROTHROMBIN INDEX: 101 % (ref 90–120)
FIBROSIS METAVIR CLASSIFICATION: NORMAL
GAMMA GLUTAMYL TRANSFERASE, FIBROMETER: 20 U/L (ref 7–33)
INFLAMETER METAVIR CLASSIFICATION: NORMAL
INFLAMETER PATIENT SCORE: 0.35
PATHOLOGIST: NORMAL
SERUM IFX INTERP: NORMAL
UREA NITROGEN, FIBROMETER: 11 MG/DL (ref 7–20)

## 2022-07-19 LAB
ANTICARDIOLIPIN IGA ANTIBODY: 1.8 APL (ref 0–14)
ANTICARDIOLIPIN IGG ANTIBODY: 3.1 GPL (ref 0–10)
CARDIOLIPIN AB IGM: 8.4 MPL (ref 0–10)
DILUTE RUSSELL VIPER VENOM TIME: NORMAL
INR BLD: 1
PARTIAL THROMBOPLASTIN TIME: 22.2 SEC (ref 21.3–31.3)
PROTHROMBIN TIME: 10.1 SEC (ref 9.4–12.6)

## 2022-08-01 ENCOUNTER — TELEPHONE (OUTPATIENT)
Dept: ORTHOPEDIC SURGERY | Age: 52
End: 2022-08-01

## 2022-08-01 NOTE — TELEPHONE ENCOUNTER
Pt is coming in for a new issue on 08/04/22. Pt is having right knee pain pt states the knee pain is causing the calf muscle to feel very tight to the point pt is unable to put her leg in a straight position.  Pt is wanting to know if an x ray can be ordered before her appt

## 2022-08-04 ENCOUNTER — OFFICE VISIT (OUTPATIENT)
Dept: ORTHOPEDIC SURGERY | Age: 52
End: 2022-08-04
Payer: COMMERCIAL

## 2022-08-04 VITALS — WEIGHT: 157 LBS | BODY MASS INDEX: 27.82 KG/M2 | HEIGHT: 63 IN

## 2022-08-04 DIAGNOSIS — M25.461 EFFUSION OF BURSA OF RIGHT KNEE: ICD-10-CM

## 2022-08-04 DIAGNOSIS — M25.561 ACUTE PAIN OF RIGHT KNEE: Primary | ICD-10-CM

## 2022-08-04 PROCEDURE — 20610 DRAIN/INJ JOINT/BURSA W/O US: CPT | Performed by: PHYSICIAN ASSISTANT

## 2022-08-04 PROCEDURE — 99214 OFFICE O/P EST MOD 30 MIN: CPT | Performed by: PHYSICIAN ASSISTANT

## 2022-08-05 RX ORDER — LIDOCAINE HYDROCHLORIDE 10 MG/ML
2 INJECTION, SOLUTION INFILTRATION; PERINEURAL ONCE
Status: COMPLETED | OUTPATIENT
Start: 2022-08-05 | End: 2022-08-09

## 2022-08-05 RX ORDER — BETAMETHASONE SODIUM PHOSPHATE AND BETAMETHASONE ACETATE 3; 3 MG/ML; MG/ML
12 INJECTION, SUSPENSION INTRA-ARTICULAR; INTRALESIONAL; INTRAMUSCULAR; SOFT TISSUE ONCE
Status: COMPLETED | OUTPATIENT
Start: 2022-08-05 | End: 2022-08-09

## 2022-08-05 NOTE — PROGRESS NOTES
[address] [facility], [address], [address] [place] [address], [id number] [facility]           Dept Phone: [phone]           Dept Fax:  [phone], [place]          Dept Phone: [phone]           Dept Fax:  [phone]      Chief Compliant:  Chief Complaint   Patient presents with    Knee Pain     Right. 1-2wks. Posterior. Feels tight        History of Present Illness: This is a 47 y.o. female who presents to the clinic today for evaluation of had concerns including Knee Pain (Right. 1-2wks. Posterior. Feels tight). Ms. Rufino Zapata is a pleasant 50-year-old female presents for 1-1/2-week history of right knee pain and swelling. Of note patient is status post lumbar laminectomy times approximately 4 weeks done by Dr. Judy Colorado. She reports no immediate complications with her back overall the back is doing quite well and she has follow-up scheduled in just a couple weeks. She is currently in physical therapy for her back at this time. Patient reports her pain in the knee started approximately 1-1/2 weeks ago without any known injury or trauma. She states she did notice significant swelling around that time which has continued until today although mildly improved. She states the most concerning aspect of this knee pain is that it intermittently will give out especially when going up and down stairs and during PT which is concerning patient for fall risk. She does note some occasional knee warmth but denies any knee redness, streaking, fever or chills. No history of surgery to this right knee in the past.       Past History:    Current Outpatient Medications:     clobetasol (TEMOVATE) 0.05 % cream, Apply topically 2 times daily. , Disp: 45 g, Rfl: 1    ibuprofen (ADVIL;MOTRIN) 800 MG tablet, Take 1 tablet by mouth every 8 hours as needed for Pain, Disp: 30 tablet, Rfl: 1    nicotine (NICODERM CQ) 14 MG/24HR, Place 1 patch onto the skin every 24 hours, Disp: 30 patch, Rfl: 3    albuterol sulfate HFA (VENTOLIN HFA) 108 (90 Base) MCG/ACT inhaler, Inhale 2 puffs into the lungs every 6 hours as needed for Wheezing, Disp: 1 each, Rfl: 1    meloxicam (MOBIC) 15 MG tablet, Take 1 tablet by mouth daily (Patient not taking: Reported on 2/18/2022), Disp: 30 tablet, Rfl: 0    JANUVIA 100 MG tablet, TAKE 1 TABLET BY MOUTH EVERY DAY, Disp: , Rfl:     rosuvastatin (CRESTOR) 10 MG tablet, TAKE 1 TABLET BY MOUTH EVERY DAY, Disp: , Rfl:     mometasone-formoterol (DULERA) 100-5 MCG/ACT inhaler, Inhale 2 puffs into the lungs 2 times daily Rinse mouth after using., Disp: 1 Inhaler, Rfl: 2    hydrocortisone 2.5 % cream, Apply once daily to corners of mouth, Disp: 60 g, Rfl: 5    ketoconazole (NIZORAL) 2 % cream, Apply once daily to corners of mouth, Disp: 30 g, Rfl: 2    pantoprazole sodium (PROTONIX) 40 MG PACK packet, Take 40 mg by mouth daily , Disp: , Rfl:     metoprolol succinate (TOPROL XL) 25 MG extended release tablet, Take 2 tablets by mouth daily (Patient taking differently: Take 50 mg by mouth daily 50 mg in the morning, 25 mg at night), Disp: 60 tablet, Rfl: 3    ranolazine (RANEXA) 1000 MG extended release tablet, Take 1,000 mg by mouth, Disp: , Rfl:     insulin glargine (TOUJEO SOLOSTAR) 300 UNIT/ML injection pen, Inject 23 Units into the skin daily 16 units BID, Disp: , Rfl:     nitroGLYCERIN (NITROSTAT) 0.4 MG SL tablet, Place 0.4 mg under the tongue every 5 minutes as needed for Chest pain Dissolve 1 tab under tongue at first sign of chest pain. May repeat every 5 minutes until relief is obtained. If pain persists after taking 3 tabs in a 15-minute period, or the pain is different than is typically experienced, call 9-1-1 immediately.  , Disp: , Rfl:     TRUETEST TEST strip, , Disp: , Rfl: 0    TRUEPLUS LANCETS 33G MISC, , Disp: , Rfl: 0    albuterol (PROVENTIL) (2.5 MG/3ML) 0.083% nebulizer solution, Take 3 mLs by nebulization every 6 hours as needed for Wheezing, Disp: 120 each, Rfl: 3  Allergies   Allergen Reactions    Bactrim [Sulfamethoxazole-Trimethoprim]     Biaxin [Clarithromycin]     Ceclor [Cefaclor]     Vancomycin      Itching, burning     Social History     Socioeconomic History    Marital status:      Spouse name: Not on file    Number of children: Not on file    Years of education: Not on file    Highest education level: Not on file   Occupational History    Not on file   Tobacco Use    Smoking status: Every Day     Packs/day: 0.50     Years: 20.00     Pack years: 10.00     Types: Cigarettes    Smokeless tobacco: Never   Vaping Use    Vaping Use: Some days    Start date: 2018    Substances: Occasionally   Substance and Sexual Activity    Alcohol use: No     Alcohol/week: 0.0 standard drinks    Drug use: No    Sexual activity: Yes     Partners: Male     Birth control/protection: Surgical     Comment: Tubal   Other Topics Concern    Not on file   Social History Narrative    Not on file     Social Determinants of Health     Financial Resource Strain: Not on file   Food Insecurity: Not on file   Transportation Needs: Not on file   Physical Activity: Not on file   Stress: Not on file   Social Connections: Not on file   Intimate Partner Violence: Not on file   Housing Stability: Not on file     Past Medical History:   Diagnosis Date    Anomalous coronary artery origin 2016    Anxiety     Asthma     Back pain     DDD (degenerative disc disease), cervical     DDD (degenerative disc disease), lumbar     [facility]     Fatty liver     Mitral prolapse     Pneumonia     Thoracic outlet syndrome     Thyroid goiter     had biopsy     Past Surgical History:   Procedure Laterality Date    BRONCHOSCOPY      CARDIAC SURGERY      x 1    CERVICAL SPINE SURGERY      plates placed     SECTION      x 2    COLONOSCOPY      ENDOSCOPY, COLON, DIAGNOSTIC      EGD    FINGER TRIGGER RELEASE Right 10/9/2019    THUMB TRIGGER RELEASE performed by Norma Wiggins MD at [address]      abdomen    CT SONO GUIDE NEEDLE BIOPSY      TONSILLECTOMY      TUBAL LIGATION       Family History   Problem Relation Age of Onset    Diabetes Mother     Diabetes Paternal Grandmother         Review of Systems   Constitutional: Negative for fever, chills, sweats. Eyes: Negative for changes in vision, or pain. HENT: Negative for ear ache, epistaxis, or sore throat. Respiratory/Cardio: Negative for Chest pain, palpitations, SOB, or cough. Gastrointestinal: Negative for abdominal pain, N/V/D. Genitourinary: Negative for dysuria, frequency, urgency, or hematuria. Neurological: Negative for headache, numbness, or weakness. Integumentary: Negative for rash, itching, laceration, or abrasion. Musculoskeletal: Positive for Knee Pain (Right. 1-2wks. Posterior. Feels tight)       Physical Exam:  Constitutional: Patient is oriented to person, place, and time. Patient appears well-developed and well nourished. HENT: Negative otherwise noted  Head: Normocephalic and Atraumatic  Nose: Normal  Eyes: Conjunctivae and EOM are normal  Neck: Normal range of motion Neck supple. Respiratory/Cardio: Effort normal. No respiratory distress. Musculoskeletal:    Right Knee:     Skin: warm and dry, no rash or erythema  Vasculature: 2+ pedal pulses bilaterally  Neuro: Sensation grossly intact to light touch diffusely  Alignment: Normal  Tenderness: Moderate tenderness to medial Joint line, mild to the lateral joint line. No tenderness to quad/patellar tendon, pes anserine bursa or posterior knee.   Effusion: Moderate    ROM: (Degrees)       A P       Extension  -5 -5       Flexion   80 95       Crepitation  Yes       Muscle strength:         Flexion   5      Extension  5      SLR   5        Extensor lag   y          Special testing:  y    Pain with deep knee flexion     y    Patellar grind       n    Patellar apprehension      n    Patellar glide         n    Lachman       n    Anterior drawer      n    Pivot shift       n    Posterior drawer      n    Dial test       n    Posterolateral drawer      n    Posterior Sag       n    MCL        n    LCL          y    Medial joint line tenderness     n    Lateral joint line tenderness     y    McMurrey's         Neurological: Patient is alert and oriented to person, place, and time. Normal strenght. No sensory deficit. Skin: Skin is warm and dry  Psychiatric: Behavior is normal. Thought content normal.  Nursing note and vitals reviewed. Labs and Imaging:           X-rays taken in clinic today and preliminarily reviewed by me 8/5/22:  AP bilateral knees standing lateral view of the right knee demonstrates overall maintenance of tricompartmental joint space with minimal degenerative changes. Questionable intra-articular loose body to the posterior joint recess. Moderate suprapatellar joint effusion. Left knee appears unremarkable on AP view. Orders Placed This Encounter   Procedures    XR KNEE RIGHT (1-2 VIEWS)     Standing Status:   Future     Number of Occurrences:   1     Standing Expiration Date:   8/1/2023       Assessment and Plan:  1. Acute pain of right knee    2. Effusion of bursa of right knee          PLAN:  Romeo Carter is a 47 y.o. old female who presents to the clinic today for evaluation of right knee pain concerning for possible medial meniscus tear of right  knee. Patient is educated on all treatment options including both nonoperative and operative intervention. Recommend symptomatic control at this time as patient is currently recovering from recent lumbar laminectomy 4 weeks ago. Discussed possibility of Medrol Dosepak versus aspiration and corticosteroid injection today.   After discussion patient elected to undergo aspiration and Celestone injection which is given as outlined below. Aspirate revealed 20 cc of clear, straw-colored synovial fluid patient tolerated procedure well. Recommend follow-up in 4 weeks should patient continue to be significantly painful in the right knee would recommend further evaluation with an MRI. Patient is educated the possibility of arthroscopic operative intervention should a medial meniscus tear be found however would recommend allowing her back to fully heal before pursuing surgical intervention on her knee which she was agreeable with. Procedure Note: right Knee aspiration and Celestone Injection   An informed verbal consent for the procedure was obtained and risks including, but not limited to: allergy to medications, injection, bleeding, stiffness of joint, recurrence of symptoms, loss of function, swelling, drainage, irrigation, need for surgery and pseudo-septic inflammation, were explained to the patient. Also, discussed was the potential for further injections, irrigation and debridement and surgery. Alternate means of treatment have also been discussed with the patient. Following an appropriate discussion with the patient regarding the risks and benefits of the procedure she consented to proceed. her right knee was prepped using betadine solution and alcohol swab. Using aseptic technique and through a superolateral joint line approach, which was then injected superficially with 2 cc of 1% lidocaine without epinephrine, utilizing 18-gauge needle 20 cc of clear, straw-colored synovial fluid is aspirated from the knee joint and subsequently with 2 cc of 6 mg/mL Celestone into the right knee. A band aid was applied to the injection site. she tolerated the injection with no immediate adverse reactions. Electronically signed by PRISCA Pittman on 8/5/22 at 7:07 AM EDT        Please note that this chart was generated using voice recognition Dragon dictation software.   Although every effort was made to ensure the accuracy of this automated transcription, some errors in transcription may have occurred.

## 2022-08-09 RX ADMIN — LIDOCAINE HYDROCHLORIDE 2 ML: 10 INJECTION, SOLUTION INFILTRATION; PERINEURAL at 09:18

## 2022-08-09 RX ADMIN — BETAMETHASONE SODIUM PHOSPHATE AND BETAMETHASONE ACETATE 12 MG: 3; 3 INJECTION, SUSPENSION INTRA-ARTICULAR; INTRALESIONAL; INTRAMUSCULAR; SOFT TISSUE at 09:19

## 2022-08-18 ENCOUNTER — HOSPITAL ENCOUNTER (OUTPATIENT)
Facility: MEDICAL CENTER | Age: 52
End: 2022-08-18

## 2022-08-19 ENCOUNTER — OFFICE VISIT (OUTPATIENT)
Dept: ONCOLOGY | Age: 52
End: 2022-08-19
Payer: COMMERCIAL

## 2022-08-19 ENCOUNTER — TELEPHONE (OUTPATIENT)
Dept: ONCOLOGY | Age: 52
End: 2022-08-19

## 2022-08-19 VITALS
TEMPERATURE: 97.7 F | SYSTOLIC BLOOD PRESSURE: 124 MMHG | WEIGHT: 157 LBS | BODY MASS INDEX: 27.81 KG/M2 | HEART RATE: 78 BPM | RESPIRATION RATE: 16 BRPM | DIASTOLIC BLOOD PRESSURE: 77 MMHG

## 2022-08-19 DIAGNOSIS — R16.1 SPLENOMEGALY: Primary | ICD-10-CM

## 2022-08-19 PROCEDURE — 99214 OFFICE O/P EST MOD 30 MIN: CPT | Performed by: INTERNAL MEDICINE

## 2022-08-19 PROCEDURE — 99211 OFF/OP EST MAY X REQ PHY/QHP: CPT | Performed by: INTERNAL MEDICINE

## 2022-08-19 RX ORDER — INSULIN ASPART 100 [IU]/ML
INJECTION, SOLUTION INTRAVENOUS; SUBCUTANEOUS
COMMUNITY
Start: 2022-04-30

## 2022-08-19 NOTE — TELEPHONE ENCOUNTER
Patient had right knee drained 8/4/22 and is experiencing increased swelling and pain with burning. She would like something for pain until she can be seen Wednesday.   MDP pending

## 2022-08-19 NOTE — PROGRESS NOTES
DIAGNOSIS:   Splenomegaly with live cirrhosis   MOLINA  Positive anticardiolipin antibody and antibeta glycoprotein antibody but no evidence of thromboembolism,Subsequent testing is negative    CURRENT THERAPY:  Repeat imaging showed stability of disease    BRIEF CASE HISTORY:   Sandy Underwood is a very pleasant 47 y.o. female who is referred to us for splenomegaly with liver cirrhosis. She recently has COVID testing and was negative. She had CT 11/2020 that showed splenomegaly and some liver changes. She was diagnosed with diabetes 5 years ago and was started on insulin and has since stopped. She has history of fungal infection in the lungs and was treated with steroids. She was found to have mass on adrenal gland that has been monitored, during the initial evaluation and notes she was informed of cirrhosis of the liver. Her imaging history shows slow enlargement of the spleen. She has consulted with GI locally and with [facility], it was felt to possibly be metabolic disorder. She has no history of significant alcohol consumption. She has been working to lose weight and has lost about 5 pounds, difficult with insulin resistance. The patient was seen by rheumatology and she has HLA-B27 testing positive. She also had further work-up that showed positive anti beta 2 glycoprotein antibody and positive for anticardiolipin IgM. The patient never had any thromboembolism. INTERIM HISTORY: This is a virtual visit. Patient seems to be doing okay, no thromboembolism. However, she recently got COVID and received dexamethasone. That led to uncontrolled diabetes and weight gain. She is fairly active without any limitation. She is weaning down insulin.     PAST MEDICAL HISTORY: has a past medical history of Anomalous coronary artery origin, Anxiety, Asthma, Back pain, DDD (degenerative disc disease), cervical, DDD (degenerative disc disease), lumbar, Diabetes mellitus ([facility] Fatty liver, Mitral prolapse, Pneumonia, Thoracic outlet syndrome, and Thyroid goiter. PAST SURGICAL HISTORY: has a past surgical history that includes Tubal ligation;  section; Cervical spine surgery; hernia repair; Colonoscopy; Endoscopy, colon, diagnostic; bronchoscopy; Cardiac surgery; Tonsillectomy; pr sono guide needle biopsy; and Finger trigger release (Right, 10/09/2019). CURRENT MEDICATIONS:  has a current medication list which includes the following prescription(s): novolog flexpen, clobetasol, ibuprofen, albuterol sulfate hfa, januvia, rosuvastatin, mometasone-formoterol, hydrocortisone, ketoconazole, pantoprazole sodium, metoprolol succinate, ranolazine, insulin glargine, nitroglycerin, truetest test, trueplus lancets 33g, albuterol, and nicotine, and the following Facility-Administered Medications: nitroglycerin and sodium chloride. ALLERGIES:  is allergic to bactrim [sulfamethoxazole-trimethoprim], biaxin [clarithromycin], ceclor [cefaclor], and vancomycin. FAMILY HISTORY: Negative for any hematological or oncological conditions. SOCIAL HISTORY:  reports that she has been smoking cigarettes. She has a 10.00 pack-year smoking history. She has never used smokeless tobacco. She reports that she does not drink alcohol and does not use drugs. REVIEW OF SYSTEMS:   General: No fever or night sweats. Weight loss - intentional  ENT: No double or blurred vision, no tinnitus or hearing problem, no dysphagia or sore throat   Respiratory: No chest pain, no shortness of breath, no cough or hemoptysis. Cardiovascular: Denies chest pain, PND or orthopnea. No L E swelling or palpitations. Gastrointestinal: No nausea or vomiting, abdominal pain, diarrhea or constipation. Genitourinary: Denies dysuria, hematuria, frequency, urgency or incontinence. Neurological: Denies headaches, decreased LOC, no sensory or motor focal deficits. Musculoskeletal: No arthralgia no back pain or joint swelling.    Skin: There are no rashes or bleeding. Psychiatric: No anxiety, no depression. Endocrine: No diabetes or thyroid disease. Hematologic: No bleeding, no adenopathy. PHYSICAL EXAM: Shows a well appearing 47y.o.-year-old female who is not in pain or distress. Vital Signs: Blood pressure 124/77, pulse 78, temperature 97.7 °F (36.5 °C), temperature source Temporal, resp. rate 16, weight 157 lb (71.2 kg), last menstrual period 07/19/2019, not currently breastfeeding. PHYSICAL EXAMINATION:  Shows a well appearing 49y.o.-year-old female who is not in pain or distress. Vital Signs: Blood pressure 124/77, pulse 78, temperature 97.7 °F (36.5 °C), temperature source Temporal, resp. rate 16, weight 157 lb (71.2 kg), last menstrual period 07/19/2019, not currently breastfeeding. HEENT: Normocephalic and atraumatic. Pupils are equal, round, reactive to light and accommodation. Extraocular muscles are intact. Neck: Showed no JVD, no carotid bruit . Lungs: Clear to auscultation bilaterally. Heart: Regular without any murmur. Abdomen: Soft, nontender. No hepatosplenomegaly. Extremities: Lower extremities show no edema, clubbing, or cyanosis. Breasts: Examination not done today.  Neuro exam: intact cranial nerves bilaterally no motor or sensory deficit, gait is normal. Lymphatic: no adenopathy appreciated in the supraclavicular, axillary, cervical or inguinal area                         REVIEW OF LABORATORY DATA:   Lab Results   Component Value Date    WBC 8.4 07/14/2022    HGB 13.2 07/14/2022    HCT 40.2 07/14/2022    MCV 87.8 07/14/2022     07/14/2022       Chemistry        Component Value Date/Time     07/14/2022 0840    K 4.2 07/14/2022 0840     07/14/2022 0840    CO2 26 07/14/2022 0840    BUN 11 07/14/2022 0840    CREATININE 0.83 07/14/2022 0840        Component Value Date/Time    CALCIUM 9.4 07/14/2022 0840    ALKPHOS 63 07/14/2022 0840    AST 13 07/14/2022 0840    AST 14 07/14/2022 0840    ALT 17 07/14/2022 0840    ALT 16 07/14/2022 0840    BILITOT 0.48 07/14/2022 0840            REVIEW OF RADIOLOGICAL RESULTS:   CT 11/10/2020  Impression    *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 No acute process is seen. *Cirrhotic liver with splenomegaly. CT 6/2021  Impression   *Cirrhotic liver with splenomegaly. No liver mass identified on the single   phase technique. Complete liver evaluation is needed, CT or MRI utilizing   liver mass protocol is recommended. IMPRESSION:   Splenomegaly with liver cirrhosis  MOLINA   Laboratory evidence concerning for APS with positive antiglycoprotein antibody and anticardiolipin antibody. No clinical thromboembolism. Repeat testing essentially negative    PLAN:   The patient is doing well.   Clinical condition is unchanged or may be a little improved  Liver function is back to normal  Platelets are improved  Return as needed      Karrie Baron MD  Hematologist/Medical [facility] hematology oncology physicians

## 2022-08-19 NOTE — TELEPHONE ENCOUNTER
PAULY ARRIVES AMBULATORY FOR MD VISIT  DR Antony Kaye IN TO SEE PATIENT  ORDERS RECEIVED  RV PRN  AVS PRINTED AND GIVEN TO PATIENT WITH INSTRUCTIONS  PATIENT DISCHARGED AMBULATORY

## 2022-08-21 RX ORDER — METHYLPREDNISOLONE 4 MG/1
TABLET ORAL
Qty: 1 KIT | Refills: 0 | Status: SHIPPED | OUTPATIENT
Start: 2022-08-21 | End: 2022-08-25

## 2022-08-24 ENCOUNTER — OFFICE VISIT (OUTPATIENT)
Dept: ORTHOPEDIC SURGERY | Age: 52
End: 2022-08-24
Payer: COMMERCIAL

## 2022-08-24 VITALS — WEIGHT: 157 LBS | BODY MASS INDEX: 27.82 KG/M2 | HEIGHT: 63 IN | RESPIRATION RATE: 14 BRPM

## 2022-08-24 DIAGNOSIS — M23.91 INTERNAL DERANGEMENT OF RIGHT KNEE: Primary | ICD-10-CM

## 2022-08-24 PROCEDURE — 99214 OFFICE O/P EST MOD 30 MIN: CPT | Performed by: PHYSICIAN ASSISTANT

## 2022-08-24 NOTE — PROGRESS NOTES
[address] [facility], [address] [facility], [address] [place] [address], [id number] [facility]           Dept Phone: [phone]           Dept Fax:  [phone] [address] [facility]           Latha Doherty          Dept Phone: [phone]           Dept Fax:  [phone]      Chief Compliant:  Chief Complaint   Patient presents with    Knee Pain     Right        History of Present Illness: This is a 47 y.o. female who presents to the clinic today for evaluation of had concerns including Knee Pain (Right). Ms. Virginia Foster returns today for reevaluation of right knee pain. There was concern for medial meniscus tear at that time however patient was 4 weeks status post lumbar laminectomy so we elected to proceed conservatively with an aspiration and injection at that time. She reports that the aspiration injection did provide significantly for pain but only lasted for a few days. She reports of the last 2 weeks her pain has been significantly worse localized pain most severely to the medial aspect. Pain is aggravated by touch to the inside of her knee as well as any twisting or turning. Also notes some swelling and warmth to the area. She denies any fever or chills. No numbness or tingling no radiation pain. Past History:    Current Outpatient Medications:     methylPREDNISolone (MEDROL DOSEPACK) 4 MG tablet, Take by mouth., Disp: 1 kit, Rfl: 0    insulin aspart (NOVOLOG FLEXPEN) 100 UNIT/ML injection pen, Indications: type 2 diabetes mellitus. , Disp: , Rfl:     clobetasol (TEMOVATE) 0.05 % cream, Apply topically 2 times daily. (Patient taking differently: Apply topically 2 times daily.  PRN), Disp: 45 g, Rfl: 1    ibuprofen (ADVIL;MOTRIN) 800 MG tablet, Take 1 tablet by mouth every 8 hours as needed for Pain, Disp: 30 tablet, Rfl: 1    nicotine (NICODERM CQ) 14 MG/24HR, Place 1 patch onto the skin every 24 hours (Patient not taking: Reported on 8/19/2022), Disp: 30 patch, Rfl: 3    albuterol sulfate HFA (VENTOLIN HFA) 108 (90 Base) MCG/ACT inhaler, Inhale 2 puffs into the lungs every 6 hours as needed for Wheezing, Disp: 1 each, Rfl: 1    JANUVIA 100 MG tablet, TAKE 1 TABLET BY MOUTH EVERY DAY, Disp: , Rfl:     rosuvastatin (CRESTOR) 10 MG tablet, TAKE 1 TABLET BY MOUTH EVERY DAY, Disp: , Rfl:     mometasone-formoterol (DULERA) 100-5 MCG/ACT inhaler, Inhale 2 puffs into the lungs 2 times daily Rinse mouth after using., Disp: 1 Inhaler, Rfl: 2    hydrocortisone 2.5 % cream, Apply once daily to corners of mouth, Disp: 60 g, Rfl: 5    ketoconazole (NIZORAL) 2 % cream, Apply once daily to corners of mouth, Disp: 30 g, Rfl: 2    pantoprazole sodium (PROTONIX) 40 MG PACK packet, Take 40 mg by mouth daily , Disp: , Rfl:     metoprolol succinate (TOPROL XL) 25 MG extended release tablet, Take 2 tablets by mouth daily (Patient taking differently: Take 50 mg by mouth daily 50 mg in the morning, 25 mg at night), Disp: 60 tablet, Rfl: 3    ranolazine (RANEXA) 1000 MG extended release tablet, Take 1,000 mg by mouth, Disp: , Rfl:     insulin glargine (TOUJEO SOLOSTAR) 300 UNIT/ML injection pen, Inject 23 Units into the skin daily 16 units BID, Disp: , Rfl:     nitroGLYCERIN (NITROSTAT) 0.4 MG SL tablet, Place 0.4 mg under the tongue every 5 minutes as needed for Chest pain Dissolve 1 tab under tongue at first sign of chest pain. May repeat every 5 minutes until relief is obtained. If pain persists after taking 3 tabs in a 15-minute period, or the pain is different than is typically experienced, call 9-1-1 immediately.  , Disp: , Rfl:     TRUETEST TEST strip, , Disp: , Rfl: 0    TRUEPLUS LANCETS 33G MISC, , Disp: , Rfl: 0    albuterol (PROVENTIL) (2.5 MG/3ML) 0.083% nebulizer solution, Take 3 mLs by nebulization every 6 hours as needed for Wheezing, Disp: 120 each, Rfl: 3  Allergies   Allergen Reactions Bactrim [Sulfamethoxazole-Trimethoprim]     Biaxin [Clarithromycin]     Ceclor [Cefaclor]     Vancomycin      Itching, burning     Social History     Socioeconomic History    Marital status:      Spouse name: Not on file    Number of children: Not on file    Years of education: Not on file    Highest education level: Not on file   Occupational History    Not on file   Tobacco Use    Smoking status: Every Day     Packs/day: 0.50     Years: 20.00     Pack years: 10.00     Types: Cigarettes    Smokeless tobacco: Never   Vaping Use    Vaping Use: Some days    Start date: 2018    Substances: Occasionally   Substance and Sexual Activity    Alcohol use: No     Alcohol/week: 0.0 standard drinks    Drug use: No    Sexual activity: Yes     Partners: Male     Birth control/protection: Surgical     Comment: Tubal   Other Topics Concern    Not on file   Social History Narrative    Not on file     Social Determinants of Health     Financial Resource Strain: Not on file   Food Insecurity: Not on file   Transportation Needs: Not on file   Physical Activity: Not on file   Stress: Not on file   Social Connections: Not on file   Intimate Partner Violence: Not on file   Housing Stability: Not on file     Past Medical History:   Diagnosis Date    Anomalous coronary artery origin 2016    Anxiety     Asthma     Back pain     DDD (degenerative disc disease), cervical     DDD (degenerative disc disease), lumbar     Diabetes mellitus ([facility] [address]     Fatty liver     Mitral prolapse     Pneumonia     Thoracic outlet syndrome     Thyroid goiter     had biopsy     Past Surgical History:   Procedure Laterality Date    BRONCHOSCOPY      CARDIAC SURGERY      x 1    CERVICAL SPINE SURGERY      plates placed     SECTION      x 2    COLONOSCOPY      ENDOSCOPY, COLON, DIAGNOSTIC      EGD    FINGER TRIGGER RELEASE Right 10/09/2019    THUMB TRIGGER RELEASE performed by Michel Suazo MD at [address] [place] [address] SONO GUIDE NEEDLE BIOPSY      TONSILLECTOMY      TUBAL LIGATION       Family History   Problem Relation Age of Onset    Diabetes Mother     Diabetes Paternal Grandmother         Review of Systems   Constitutional: Negative for fever, chills, sweats. Eyes: Negative for changes in vision, or pain. HENT: Negative for ear ache, epistaxis, or sore throat. Respiratory/Cardio: Negative for Chest pain, palpitations, SOB, or cough. Gastrointestinal: Negative for abdominal pain, N/V/D. Genitourinary: Negative for dysuria, frequency, urgency, or hematuria. Neurological: Negative for headache, numbness, or weakness. Integumentary: Negative for rash, itching, laceration, or abrasion. Musculoskeletal: Positive for Knee Pain (Right)       Physical Exam:  Constitutional: Patient is oriented to person, place, and time. Patient appears well-developed and well nourished. HENT: Negative otherwise noted  Head: Normocephalic and Atraumatic  Nose: Normal  Eyes: Conjunctivae and EOM are normal  Neck: Normal range of motion Neck supple. Respiratory/Cardio: Effort normal. No respiratory distress. Musculoskeletal:    Right Knee:     Skin: warm and dry, no rash or erythema  Vasculature: 2+ pedal pulses bilaterally  Neuro: Sensation grossly intact to light touch diffusely  Alignment: Normal  Tenderness: Severe tenderness to medial joint line. No tenderness to quad/patellar tendon, pes anserine bursa or posterior knee.   Effusion: Small    ROM: (Degrees)       A P       Extension  -5 -5       Flexion   100 110       Crepitation  Yes       Muscle strength:         Flexion   5      Extension  5      SLR   5        Extensor lag   y          Special testing:  y    Pain with deep knee flexion     y    Patellar grind       n    Patellar apprehension      n    Patellar glide         n    Lachman       n    Anterior drawer      n    Pivot shift       n    Posterior drawer      n    Dial test       n    Posterolateral drawer      n    Posterior Sag       n    MCL        n    LCL          severe    Medial joint line tenderness     n    Lateral joint line tenderness     y    McMurrey's         Neurological: Patient is alert and oriented to person, place, and time. Normal strenght. No sensory deficit. Skin: Skin is warm and dry  Psychiatric: Behavior is normal. Thought content normal.  Nursing note and vitals reviewed. Labs and Imaging:     XR KNEE RIGHT (1-2 VIEWS)  Formatting of this result is different from the original.     X-rays taken in clinic today and preliminarily reviewed by me 8/5/22:  AP bilateral knees standing lateral view of the right knee demonstrates   overall maintenance of tricompartmental joint space with minimal   degenerative changes. Questionable intra-articular loose body to the   posterior joint recess. Moderate suprapatellar joint effusion. Left knee   appears unremarkable on AP view. Orders Placed This Encounter   Procedures    MRI KNEE RIGHT WO CONTRAST     Standing Status:   Future     Standing Expiration Date:   8/24/2023     Order Specific Question:   Reason for exam:     Answer:   Possible medial/lateral meniscus tear       Assessment and Plan:  1. Internal derangement of right knee          PLAN:  Maryanne Vazquez is a 47 y.o. old female who presents to the clinic today for evaluation of right knee pain concerning for possible medial meniscus tear of right  knee. Patient is educated on all treatment options including both nonoperative and operative intervention. At this time I believe patient will benefit from further diagnostic evaluation with MRI of the right  knee to evaluate for possible medial meniscus tear. Patient did call last week and a Medrol Dosepak was recommended but was not sent we will send this today.     Patient does inquire about a repeat aspiration however given her small effusion would recommend holding off on this at this time given recent aspiration and injection just on 8/4/2022. Follow up after MRI is completed     Electronically signed by ThePRISCA Larkin on 8/24/22 at 11:04 AM EDT        Please note that this chart was generated using voice recognition Dragon dictation software. Although every effort was made to ensure the accuracy of this automated transcription, some errors in transcription may have occurred.

## 2022-09-01 ENCOUNTER — HOSPITAL ENCOUNTER (OUTPATIENT)
Dept: MRI IMAGING | Facility: CLINIC | Age: 52
Discharge: HOME | End: 2022-09-03
Payer: COMMERCIAL

## 2022-09-01 DIAGNOSIS — M23.91 INTERNAL DERANGEMENT OF RIGHT KNEE: ICD-10-CM

## 2022-09-01 PROCEDURE — 73721 MRI JNT OF LWR EXTRE W/O DYE: CPT

## 2022-09-02 ENCOUNTER — TELEPHONE (OUTPATIENT)
Dept: ORTHOPEDIC SURGERY | Age: 52
End: 2022-09-02

## 2022-09-02 NOTE — TELEPHONE ENCOUNTER
Patient called office for clarification on her MRI results. Results were provided to patient and a discussion of the results took place. Patient also notified of Collado's recommendations. Patient voiced understanding and is going to call office back at a later date to schedule an appt with Dr. Bradley Roth. She is out of town next week.    ----- Message from Isiah Bowen sent at 9/1/2022  1:20 PM EDT -----  Posterior horn medial meniscus tear moderate effusion medial patellofemoral chondromalacia recommend follow-up with Dr. Bradley Roth to discuss possible arthroscopic intervention. Lizet Plascencia

## 2022-09-16 ENCOUNTER — OFFICE VISIT (OUTPATIENT)
Dept: ORTHOPEDIC SURGERY | Age: 52
End: 2022-09-16
Payer: COMMERCIAL

## 2022-09-16 VITALS — BODY MASS INDEX: 27.82 KG/M2 | RESPIRATION RATE: 14 BRPM | HEIGHT: 63 IN | WEIGHT: 157 LBS

## 2022-09-16 DIAGNOSIS — M75.81 TENDINITIS OF RIGHT ROTATOR CUFF: ICD-10-CM

## 2022-09-16 DIAGNOSIS — S83.231A COMPLEX TEAR OF MEDIAL MENISCUS OF RIGHT KNEE AS CURRENT INJURY, INITIAL ENCOUNTER: Primary | ICD-10-CM

## 2022-09-16 PROCEDURE — 99214 OFFICE O/P EST MOD 30 MIN: CPT | Performed by: PHYSICIAN ASSISTANT

## 2022-09-16 PROCEDURE — 20610 DRAIN/INJ JOINT/BURSA W/O US: CPT | Performed by: PHYSICIAN ASSISTANT

## 2022-09-16 RX ORDER — TRIAMCINOLONE ACETONIDE 40 MG/ML
40 INJECTION, SUSPENSION INTRA-ARTICULAR; INTRAMUSCULAR ONCE
Status: COMPLETED | OUTPATIENT
Start: 2022-09-16 | End: 2022-09-16

## 2022-09-16 RX ORDER — LIDOCAINE HYDROCHLORIDE 10 MG/ML
1 INJECTION, SOLUTION INFILTRATION; PERINEURAL ONCE
Status: COMPLETED | OUTPATIENT
Start: 2022-09-16 | End: 2022-09-16

## 2022-09-16 RX ADMIN — TRIAMCINOLONE ACETONIDE 40 MG: 40 INJECTION, SUSPENSION INTRA-ARTICULAR; INTRAMUSCULAR at 13:15

## 2022-09-16 RX ADMIN — LIDOCAINE HYDROCHLORIDE 1 ML: 10 INJECTION, SOLUTION INFILTRATION; PERINEURAL at 13:16

## 2022-09-19 NOTE — PROGRESS NOTES
[facility], [address] [place], [address] [place] [address], [address] [facility]           Dept Phone: [phone]           Dept Fax:  [phone] [facility] [address] [facility]           Latha Doherty          Dept Phone: [phone]           Dept Fax:  [phone]      Chief Compliant:  Chief Complaint   Patient presents with    Knee Pain     Right        History of Present Illness: This is a 47 y.o. female who presents to the clinic today for evaluation of had concerns including Knee Pain (Right). Ms. Mykel Dhaliwal is a 64-year-old female returns today for reevaluation of right knee pain and to discuss recent MRI results on this right knee. Also here for reevaluation of right shoulder pain. Right knee:  Patient was initially seen for right knee pain on 8/4/2022 there was concern for medial meniscus tear however at that time elected proceed conservatively with an aspiration and injection. On follow-up on 8/24/2022 continue to be significantly painful so an MRI of the right knee was ordered. MRI was completed on 9/1/2022 and demonstrated a radial tear of the posterior horn of the medial meniscus. Patient Hodan Betancourt continues to have significant pain over the medial aspect of the right knee. She is ambulatory without any assistive device but continues to have significant pain in this area. She denies any new injury or trauma no knee joint warmth, redness, fever or chills. Right shoulder:  Patient reports chronic right shoulder pain. She was evaluated for this issue multiple times in 2021 and underwent Kenalog injections on 5/14/2021 and 11/18/2021. Patient returns today stating that over the last 2 to 3 months her pain has gradually returned and is requesting repeat injection in the right shoulder.   She denies any new injury or trauma no shoulder warmth, redness, fever chills Past History:    Current Outpatient Medications:     insulin aspart (NOVOLOG FLEXPEN) 100 UNIT/ML injection pen, Indications: type 2 diabetes mellitus. , Disp: , Rfl:     clobetasol (TEMOVATE) 0.05 % cream, Apply topically 2 times daily. (Patient taking differently: Apply topically 2 times daily.  PRN), Disp: 45 g, Rfl: 1    ibuprofen (ADVIL;MOTRIN) 800 MG tablet, Take 1 tablet by mouth every 8 hours as needed for Pain, Disp: 30 tablet, Rfl: 1    nicotine (NICODERM CQ) 14 MG/24HR, Place 1 patch onto the skin every 24 hours (Patient not taking: Reported on 8/19/2022), Disp: 30 patch, Rfl: 3    albuterol sulfate HFA (VENTOLIN HFA) 108 (90 Base) MCG/ACT inhaler, Inhale 2 puffs into the lungs every 6 hours as needed for Wheezing, Disp: 1 each, Rfl: 1    JANUVIA 100 MG tablet, TAKE 1 TABLET BY MOUTH EVERY DAY, Disp: , Rfl:     rosuvastatin (CRESTOR) 10 MG tablet, TAKE 1 TABLET BY MOUTH EVERY DAY, Disp: , Rfl:     mometasone-formoterol (DULERA) 100-5 MCG/ACT inhaler, Inhale 2 puffs into the lungs 2 times daily Rinse mouth after using., Disp: 1 Inhaler, Rfl: 2    hydrocortisone 2.5 % cream, Apply once daily to corners of mouth, Disp: 60 g, Rfl: 5    ketoconazole (NIZORAL) 2 % cream, Apply once daily to corners of mouth, Disp: 30 g, Rfl: 2    pantoprazole sodium (PROTONIX) 40 MG PACK packet, Take 40 mg by mouth daily , Disp: , Rfl:     metoprolol succinate (TOPROL XL) 25 MG extended release tablet, Take 2 tablets by mouth daily (Patient taking differently: Take 50 mg by mouth daily 50 mg in the morning, 25 mg at night), Disp: 60 tablet, Rfl: 3    ranolazine (RANEXA) 1000 MG extended release tablet, Take 1,000 mg by mouth, Disp: , Rfl:     insulin glargine (TOUJEO SOLOSTAR) 300 UNIT/ML injection pen, Inject 23 Units into the skin daily 16 units BID, Disp: , Rfl:     nitroGLYCERIN (NITROSTAT) 0.4 MG SL tablet, Place 0.4 mg under the tongue every 5 minutes as needed for Chest pain Dissolve 1 tab under tongue at first sign of chest pain. May repeat every 5 minutes until relief is obtained. If pain persists after taking 3 tabs in a 15-minute period, or the pain is different than is typically experienced, call 9-1-1 immediately.  , Disp: , Rfl:     TRUETEST TEST strip, , Disp: , Rfl: 0    TRUEPLUS LANCETS 33G MISC, , Disp: , Rfl: 0    albuterol (PROVENTIL) (2.5 MG/3ML) 0.083% nebulizer solution, Take 3 mLs by nebulization every 6 hours as needed for Wheezing, Disp: 120 each, Rfl: 3  Allergies   Allergen Reactions    Bactrim [Sulfamethoxazole-Trimethoprim]     Biaxin [Clarithromycin]     Ceclor [Cefaclor]     Vancomycin      Itching, burning     Social History     Socioeconomic History    Marital status:      Spouse name: Not on file    Number of children: Not on file    Years of education: Not on file    Highest education level: Not on file   Occupational History    Not on file   Tobacco Use    Smoking status: Every Day     Packs/day: 0.50     Years: 20.00     Pack years: 10.00     Types: Cigarettes    Smokeless tobacco: Never   Vaping Use    Vaping Use: Some days    Start date: 2/28/2018    Substances: Occasionally   Substance and Sexual Activity    Alcohol use: No     Alcohol/week: 0.0 standard drinks    Drug use: No    Sexual activity: Yes     Partners: Male     Birth control/protection: Surgical     Comment: Tubal   Other Topics Concern    Not on file   Social History Narrative    Not on file     Social Determinants of Health     Financial Resource Strain: Not on file   Food Insecurity: Not on file   Transportation Needs: Not on file   Physical Activity: Not on file   Stress: Not on file   Social Connections: Not on file   Intimate Partner Violence: Not on file   Housing Stability: Not on file     Past Medical History:   Diagnosis Date    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    Anxiety     Asthma     Back pain     DDD (degenerative disc disease), cervical     DDD (degenerative disc disease), lumbar     Diabetes mellitus ([facility] [address]     Fatty liver     Mitral prolapse     Pneumonia     Thoracic outlet syndrome     Thyroid goiter     had biopsy     Past Surgical History:   Procedure Laterality Date    BRONCHOSCOPY      CARDIAC SURGERY      x 1    CERVICAL SPINE SURGERY      plates placed     SECTION      x 2    COLONOSCOPY      ENDOSCOPY, COLON, DIAGNOSTIC      EGD    FINGER TRIGGER RELEASE Right 10/09/2019    THUMB TRIGGER RELEASE performed by Sherrell Mayo MD at [address]      abdomen    IA SONO GUIDE NEEDLE BIOPSY      TONSILLECTOMY      TUBAL LIGATION       Family History   Problem Relation Age of Onset    Diabetes Mother     Diabetes Paternal Grandmother         Review of Systems   Constitutional: Negative for fever, chills, sweats. Eyes: Negative for changes in vision, or pain. HENT: Negative for ear ache, epistaxis, or sore throat. Respiratory/Cardio: Negative for Chest pain, palpitations, SOB, or cough. Gastrointestinal: Negative for abdominal pain, N/V/D. Genitourinary: Negative for dysuria, frequency, urgency, or hematuria. Neurological: Negative for headache, numbness, or weakness. Integumentary: Negative for rash, itching, laceration, or abrasion. Musculoskeletal: Positive for Knee Pain (Right)       Physical Exam:  Constitutional: Patient is oriented to person, place, and time. Patient appears well-developed and well nourished. HENT: Negative otherwise noted  Head: Normocephalic and Atraumatic  Nose: Normal  Eyes: Conjunctivae and EOM are normal  Neck: Normal range of motion Neck supple. Respiratory/Cardio: Effort normal. No respiratory distress. Musculoskeletal:    Right Knee:     Skin: warm and dry, no rash or erythema  Vasculature: 2+ pedal pulses bilaterally  Neuro: Sensation grossly intact to light touch diffusely  Alignment: Normal  Tenderness: Severe tenderness over medial joint line. No tenderness to quad/patellar tendon, pes anserine bursa or posterior knee.   Effusion: Trace    ROM: (Degrees)       A P       Extension  0 0       Flexion   85 90       Crepitation  Yes       Muscle strength:         Flexion   5      Extension  5      SLR   5        Extensor lag   y          Special testing:  y    Pain with deep knee flexion     y    Patellar grind       n    Patellar apprehension      n    Patellar glide         n    Lachman       n    Anterior drawer      n    Pivot shift       n    Posterior drawer      n    Dial test       n    Posterolateral drawer      n    Posterior Sag       n    MCL        n    LCL          y    Medial joint line tenderness     n    Lateral joint line tenderness     y    McMurrey's       Right Shoulder:    Skin: warm and dry, no rash or erythema; no swelling or obvious muscular atrophy  Vasculature: 2+ radial pulses bilaterally  Neuro: Sensation grossly intact to light touch diffusely  Tenderness: Moderate tenderness to the posterior lateral shoulder near the subacromial space. No tenderness to the [facility] [place] joint or the bicipital groove.     ROM: (Degrees)       A P       Elevation  150 170        Abduction  140 170         ER   80 80       IR   L1          90 abd/ER          90 abd/IR          Crepitation  No       Dyskenesia  No          Muscle strength:               Deltoid   5      Supraspinatus  4      ER   5      IR   5        Special tests    Right   Rotator Cuff        y   Painful arc       n   Pain with ER        y   Neer's         y   Hawkin's        n   Drop Arm      n   Lift off/Belly Press     n   ER Lag              AC Joint  n   AC tenderness     n   Cross-chest adduction         Labrum/biceps    n   Williamson's       n   Biceps sheer          n   Speed's/Yergason's       n   Tenderness Biceps Groove      n   Brandon's             Instability  n   Ant Apprehension       n   Post Apprehension       n   Ant Load shift        n   Post Load shift      n   Sulcus       n   Generalized Laxity     n   Relocation test     n   Crank test n   Arturo-superior escape         NA = Not assessed  RTC = Rotator cuff  RCT = Rotator cuff tear  ER = External rotation  IR = Internal rotation  AC = Acromioclavicular  GH = Glenohumeral  n = No  y = Yes        Neurological: Patient is alert and oriented to person, place, and time. Normal strenght. No sensory deficit. Skin: Skin is warm and dry  Psychiatric: Behavior is normal. Thought content normal.  Nursing note and vitals reviewed. Labs and Imaging:     MRI KNEE RIGHT WO CONTRAST  Narrative: EXAMINATION:  MRI OF THE RIGHT KNEE WITHOUT CONTRAST, 9/1/2022 7:06 am    TECHNIQUE:  Multiplanar multisequence MRI of the right knee was performed without the  administration of intravenous contrast.    COMPARISON:  Right knee plain radiographs from 08/04/2022. HISTORY:  ORDERING SYSTEM PROVIDED HISTORY: Internal derangement of right knee  TECHNOLOGIST PROVIDED HISTORY:  Possible medial/lateral meniscus tear  Reason for Exam: LEFT KNEE PAIN / LIMITED ROM. TIGHTNESS IN JOINT. X 3 WEEKS. NKI    60-year-old female with left knee pain and limited range of motion; joint  tightness. FINDINGS:  MENISCI: Degeneration in the posterior horn and body of the lateral meniscus. No lateral meniscus tear. Radial type tear and volume loss in the posterior horn root ligament and  posterior horn of the medial meniscus as seen on image 11, series 10. CRUCIATE LIGAMENTS: Anterior and posterior cruciate ligaments appear intact. EXTENSOR MECHANISM: Mild superior and inferior patellar tendinosis. Distal  quadriceps tendon and patellar retinacula appear intact. LATERAL COLLATERAL LIGAMENT COMPLEX: Popliteus muscle/tendon, iliotibial  band, lateral collateral ligament, and biceps femoris appear intact. MEDIAL COLLATERAL LIGAMENT COMPLEX: Medial collateral ligament complex  appears intact. KNEE JOINT: Moderate joint effusion. Osseous alignment is normal.    No dislocation. No displaced fracture.     Grade 3 medial compartment chondromalacia. Reactive marrow edema and subtle  subchondral fracture at the outer medial tibial plateau on image 12, series 8  and series 7 as well as image 6, series 10 and series 9. Articular cartilage of the lateral compartment appears grossly intact without  focal chondral defect. Partial and full-thickness fissuring of the patellar apex and medial patellar  facet superimposed on grade 3 chondromalacia. BONE MARROW: Bone marrow signal intensity within the visualized osseous  structures otherwise within normal limits. SOFT TISSUES: Visualized popliteal neurovascular bundle grossly unremarkable. No sizable Baker's cyst.  Mild edema in the subcutaneous fat along the  anterior knee. Impression: 1. Radial type tear and volume loss in the root ligament of the posterior  horn and posterior horn of the medial meniscus. 2. Suspected nondisplaced subchondral fracture at the outer medial tibial  plateau with associated marrow edema. 3. Degeneration within the lateral meniscus. No lateral meniscus tear. 4. Mild superior and inferior patellar tendinosis. 5. Moderate joint effusion. 6. Grade 3 chondromalacia with superimposed partial and full-thickness  fissuring of the patellar apex and medial patellar facet. 7. Moderate medial compartment chondromalacia. Orders Placed This Encounter   Procedures    75483 - DRAIN/INJECT LARGE JOINT BURSA       Assessment and Plan:  1. Complex tear of medial meniscus of right knee as current injury, initial encounter    2. Tendinitis of right rotator cuff          PLAN:  Jcarlos Esquivel is a 47 y.o. old female who presents for reevaluation of right knee pain to discuss recent MRI results and reevaluation of right shoulder pain. Right medial meniscus tear  Had lengthy discussion with patient on MRI results demonstrating tear of the medial meniscus.   Patient has failed conservative management with activity modification, home exercise program and corticosteroid injection on 8/4/2022. Had discussion with patient on treatment options available to her for medial meniscus tear including nonoperative versus operative intervention. At this time given her failure of conservative management patient would like to proceed with operative intervention. Discussed case with Dr. Priscilla Garcia who was available for review of MRI results and recommended to pursue of arthroscopic partial medial meniscectomy if patient continues to be painful. Had this discussion with patient she was agreeable to plan at this time  Patient is 13 weeks out from lumbar laminectomy she does see her spinal surgeon today is recommended to get clearance before proceeding with scheduling of the right knee arthroscopy. Patient to call our office next week for scheduling and details of right knee arthroscopy partial medial meniscectomy with Dr. Priscilla Garcia. Right RTC impingement syndrome  1. Right shoulder pain that is consistent with rotator cuff tendinitis. We discussed treatment options available to her, including nonoperative and operative intervention. 2. At this time I believe she will benefit from conservative management. 3.  We also discussed use of a prescription NSAID versus a cortisone injection and at this time she has elected to proceed with a cortisone injection which was administered as outlined below. Procedure: right shoulder subacromial space injection  Following an appropriate discussion with the patient regarding the risks and benefits of the procedure she consented to proceed. her right shoulder was prepped using chlorhexadine solution. Using aseptic technique and through a posterior approach, her right shoulder subacromial space was injected with a 4 cc mixture of 1cc 40mg/ml kenalog and 3 cc of 1% lidocaine without epinephrine. A band aid was applied to the injection site. she tolerated the injection with no immediate adverse reactions.       Electronically signed by [place], [place] on 9/19/22 at 9:29 AM EDT        Please note that this chart was generated using voice recognition Dragon dictation software. Although every effort was made to ensure the accuracy of this automated transcription, some errors in transcription may have occurred.

## 2022-09-20 ENCOUNTER — TELEPHONE (OUTPATIENT)
Dept: ORTHOPEDIC SURGERY | Age: 52
End: 2022-09-20

## 2022-10-07 ENCOUNTER — HOSPITAL ENCOUNTER (OUTPATIENT)
Dept: ULTRASOUND IMAGING | Facility: CLINIC | Age: 52
Discharge: HOME | End: 2022-10-09
Payer: COMMERCIAL

## 2022-10-07 ENCOUNTER — HOSPITAL ENCOUNTER (OUTPATIENT)
Facility: CLINIC | Age: 52
Discharge: HOME | End: 2022-10-07
Payer: COMMERCIAL

## 2022-10-07 DIAGNOSIS — E04.9 NONTOXIC GOITER: ICD-10-CM

## 2022-10-07 PROCEDURE — 84439 ASSAY OF FREE THYROXINE: CPT

## 2022-10-07 PROCEDURE — 36415 COLL VENOUS BLD VENIPUNCTURE: CPT

## 2022-10-07 PROCEDURE — 76536 US EXAM OF HEAD AND NECK: CPT

## 2022-10-07 PROCEDURE — 84481 FREE ASSAY (FT-3): CPT

## 2022-10-07 PROCEDURE — 84443 ASSAY THYROID STIM HORMONE: CPT

## 2022-10-08 LAB
T3 FREE: 2.5 PG/ML (ref 2.02–4.43)
THYROXINE, FREE: 0.85 NG/DL (ref 0.93–1.7)
TSH SERPL DL<=0.05 MIU/L-ACNC: 1.91 UIU/ML (ref 0.3–5)

## 2022-11-11 DIAGNOSIS — B37.31 VAGINAL THRUSH: ICD-10-CM

## 2022-11-11 RX ORDER — FLUCONAZOLE 150 MG/1
TABLET ORAL
Qty: 1 TABLET | Refills: 0 | Status: SHIPPED | OUTPATIENT
Start: 2022-11-11 | End: 2022-11-29

## 2022-11-11 NOTE — TELEPHONE ENCOUNTER
Mariela Saha is calling to request a refill on the following medication(s):    Last Visit Date (If Applicable):  7/2/8027    Next Visit Date:    Visit date not found    Medication Request:  Requested Prescriptions     Pending Prescriptions Disp Refills    fluconazole (DIFLUCAN) 150 MG tablet 1 tablet 0     Sig: TAKE ONE TABLET BY MOUTH IN A SINGLE DOSE

## 2022-11-17 ENCOUNTER — HOSPITAL ENCOUNTER (OUTPATIENT)
Facility: CLINIC | Age: 52
Discharge: HOME | End: 2022-11-17
Payer: COMMERCIAL

## 2022-11-17 ENCOUNTER — HOSPITAL ENCOUNTER (OUTPATIENT)
Dept: ULTRASOUND IMAGING | Facility: CLINIC | Age: 52
Discharge: HOME | End: 2022-11-19
Payer: COMMERCIAL

## 2022-11-17 DIAGNOSIS — K74.60 HEPATIC CIRRHOSIS, UNSPECIFIED HEPATIC CIRRHOSIS TYPE, UNSPECIFIED WHETHER ASCITES PRESENT (HCC): ICD-10-CM

## 2022-11-17 LAB
AFP: 4 UG/L
ALBUMIN SERPL-MCNC: 4.4 G/DL (ref 3.5–5.2)
ALBUMIN/GLOBULIN RATIO: 1.6 (ref 1–2.5)
ALP BLD-CCNC: 69 U/L (ref 35–104)
ALT SERPL-CCNC: 15 U/L (ref 5–33)
AST SERPL-CCNC: 17 U/L
BILIRUB SERPL-MCNC: 0.5 MG/DL (ref 0.3–1.2)
BILIRUBIN DIRECT: 0.1 MG/DL
BILIRUBIN, INDIRECT: 0.4 MG/DL (ref 0–1)
INR BLD: 1
PROTHROMBIN TIME: 10.1 SEC (ref 9.4–12.6)
TOTAL PROTEIN: 7.2 G/DL (ref 6.4–8.3)

## 2022-11-17 PROCEDURE — 36415 COLL VENOUS BLD VENIPUNCTURE: CPT

## 2022-11-17 PROCEDURE — 76705 ECHO EXAM OF ABDOMEN: CPT

## 2022-11-17 PROCEDURE — 82105 ALPHA-FETOPROTEIN SERUM: CPT

## 2022-11-17 PROCEDURE — 80076 HEPATIC FUNCTION PANEL: CPT

## 2022-11-17 PROCEDURE — 85610 PROTHROMBIN TIME: CPT

## 2022-11-23 ENCOUNTER — HOSPITAL ENCOUNTER (OUTPATIENT)
Dept: PREADMISSION TESTING | Age: 52
Discharge: HOME | End: 2022-11-23

## 2022-11-29 ENCOUNTER — ANESTHESIA EVENT (OUTPATIENT)
Dept: OPERATING ROOM | Age: 52
End: 2022-11-29
Payer: COMMERCIAL

## 2022-11-29 ENCOUNTER — HOSPITAL ENCOUNTER (OUTPATIENT)
Dept: PREADMISSION TESTING | Age: 52
Discharge: HOME | End: 2022-12-03

## 2022-11-29 ENCOUNTER — HOSPITAL ENCOUNTER (OUTPATIENT)
Age: 52
Setting detail: SPECIMEN
Discharge: HOME | End: 2022-11-29

## 2022-11-29 VITALS
HEIGHT: 62 IN | HEART RATE: 80 BPM | WEIGHT: 155 LBS | RESPIRATION RATE: 16 BRPM | TEMPERATURE: 98.1 F | BODY MASS INDEX: 28.52 KG/M2 | SYSTOLIC BLOOD PRESSURE: 138 MMHG | DIASTOLIC BLOOD PRESSURE: 77 MMHG | OXYGEN SATURATION: 97 %

## 2022-11-29 DIAGNOSIS — Z01.818 PRE-OP TESTING: Primary | ICD-10-CM

## 2022-11-29 DIAGNOSIS — Z01.818 PRE-OP TESTING: ICD-10-CM

## 2022-11-29 LAB
ANION GAP SERPL CALCULATED.3IONS-SCNC: 10 MMOL/L (ref 9–17)
BUN BLDV-MCNC: 11 MG/DL (ref 6–20)
CALCIUM SERPL-MCNC: 9.4 MG/DL (ref 8.6–10.4)
CHLORIDE BLD-SCNC: 106 MMOL/L (ref 98–107)
CO2: 26 MMOL/L (ref 20–31)
CREAT SERPL-MCNC: 0.84 MG/DL (ref 0.5–0.9)
GFR SERPL CREATININE-BSD FRML MDRD: >60 ML/MIN/1.73M2
GLUCOSE BLD-MCNC: 167 MG/DL (ref 70–99)
HCT VFR BLD CALC: 39.9 % (ref 36.3–47.1)
HEMOGLOBIN: 13.2 G/DL (ref 11.9–15.1)
MCH RBC QN AUTO: 28.9 PG (ref 25.2–33.5)
MCHC RBC AUTO-ENTMCNC: 33.1 G/DL (ref 28.4–34.8)
MCV RBC AUTO: 87.5 FL (ref 82.6–102.9)
NRBC AUTOMATED: 0 PER 100 WBC
PDW BLD-RTO: 13.2 % (ref 11.8–14.4)
PLATELET # BLD: 146 K/UL (ref 138–453)
PMV BLD AUTO: 12.2 FL (ref 8.1–13.5)
POTASSIUM SERPL-SCNC: 4.2 MMOL/L (ref 3.7–5.3)
RBC # BLD: 4.56 M/UL (ref 3.95–5.11)
SODIUM BLD-SCNC: 142 MMOL/L (ref 135–144)
WBC # BLD: 7.2 K/UL (ref 3.5–11.3)

## 2022-11-29 ASSESSMENT — PAIN SCALES - GENERAL: PAINLEVEL_OUTOF10: 1

## 2022-11-29 ASSESSMENT — PAIN DESCRIPTION - LOCATION: LOCATION: KNEE

## 2022-11-29 ASSESSMENT — PAIN DESCRIPTION - ORIENTATION: ORIENTATION: RIGHT

## 2022-11-29 NOTE — DISCHARGE INSTRUCTIONS
DAY OF SURGERY/PROCEDURE  GUIDELINES    As a patient at the [facility], you can expect quality medical and nursing care that is centered on your individual needs. It is our goal to make your surgical experience as comfortable and excellent as possible.  ________________________________________________________________________    The following instructions are general guidelines, if any information on this sheet is different from what your doctor has instructed you to do, please follow your doctor's instructions. Please arrive on 12/5 @ 830 am      Enter through entrance C. Check in at registration     Upon arrival you will be taken to the pre-operative area to get ready for surgery, your family will stay in the waiting room and visit with you once you are ready for surgery. Due to special limitations please limit visitation to 1-2 members of your family at a time. When it is time for surgery your family will return to the waiting room. Nothing to eat, drink, smoke, suck or chew after midnight (no water, gum, mints, cigarettes, cigars, pipes, snuff, chewing tobacco, etc.) or your surgery may be canceled. Take a shower or bath on the morning of your surgery/procedure (Hibiclens if directed) Do not apply any lotions. Brush your teeth, but do not swallow any water    IN CASE OF ILLNESS - If you have a cold or flu symptoms (high fever, runny nose, sore throat, cough, etc.) rash, nausea, vomiting, loose stools, and/or recent contact with someone who has a contagious disease (chick pox, measles, etc.) please call your doctor before coming to the surgery center    Take a small sip of water with heart, blood pressure, and/or seizure medication the morning of surgery.  Metoprolol    If applicable bring your:  Inhaler (s)  Eyeglasses and Case (If you wear contacts they have to be removed before surgery, bring case and solution)     DO NOT take anticoagulants (blood thinners, aspirin or aspirin-containing products) as instructed by your physician. DO NOT take any diabetic pills or insulin morning of your surgery. Leave all jewelry at home and wear loose, comfortable clothing that is easy to put on and take off. If you will be returning home the same day as your surgery, you will need to have a responsible adult (25years of age or older) present to drive you home. You will need someone stay with you at home for the first 24 hours following your surgery. This is due to the anesthesia and the medication given to you during surgery and recovery.

## 2022-11-29 NOTE — DISCHARGE INSTRUCTIONS
Levon Vallecillo M.D.  [phone]  POST OPERATIVE DISCHARGE INSTRUCTIONS   KNEE ARTHROSCOPY     Follow-up in office seven to ten days after surgery. Call for appointment if not already made. ([phone]). Take pain medication as ordered. Keep the dressing/ace wrap on for 72 hours (3 days). After the 72 hours, may remove ace wrap and dressing, place Band-Aids over sutures and then if desired reapply ace wrap. Start wrapping at the ankle and wrap up to the top of the leg. You may shower, but keep the dressing & wounds dry with plastic bag around knee/leg until seen by Dr. Avani Soriano. After surgery, it is weight bearing as tolerated, unless instructed differently by Dr. Avani Soriano after surgery. Use crutches or a walker as needed based on how your knee feels. Be sure to keep your leg elevated when sitting or lying down. Use 2-3 pillows under leg. Ice to surgical site for 20 to 30 minutes four times a day for 48 hours. Dr. Avani Soriano recommends taking one Aspirin 325 mg daily for 7 days after surgery to help prevent blood clots. Be sure to do your leg exercises daily. Examples of these exercises are in the knee arthroscopy booklet given in Dr. Gomez Quiet office. Call Dr. Gomez Quiet office if you experience any of the following:  Temperature above 101° F.  Persistent nausea and vomiting. Severe swelling in the knee, calf, or foot. Severe pain that is not relieved by pain medications. No alcoholic beverages, no driving or operating machinery, no making important decisions for 24 hours. You may have a normal diet but should eat lightly day of surgery. Drink plenty of fluids.   Urinate within 8 hours after surgery, if unable to urinate call your doctor

## 2022-12-02 ENCOUNTER — TELEPHONE (OUTPATIENT)
Dept: ORTHOPEDIC SURGERY | Age: 52
End: 2022-12-02

## 2022-12-02 NOTE — TELEPHONE ENCOUNTER
Patient schedlued for surgery with  and called to see what medication could she take for pain. She states she is unable to take Tylenol due to liver and can not take IBU due to upcoming surgery. Patient wanted to verify that it was ok for her to take Flexril. Patient currently has a script for her back. She was advised she could take them but to not morning or night before surgery. Voiced understanding

## 2022-12-05 ENCOUNTER — ANESTHESIA (OUTPATIENT)
Dept: OPERATING ROOM | Age: 52
End: 2022-12-05
Payer: COMMERCIAL

## 2022-12-05 ENCOUNTER — HOSPITAL ENCOUNTER (OUTPATIENT)
Age: 52
Setting detail: OUTPATIENT SURGERY
Discharge: HOME | End: 2022-12-05
Attending: ORTHOPAEDIC SURGERY | Admitting: ORTHOPAEDIC SURGERY
Payer: COMMERCIAL

## 2022-12-05 VITALS
OXYGEN SATURATION: 97 % | BODY MASS INDEX: 28.82 KG/M2 | TEMPERATURE: 97 F | RESPIRATION RATE: 13 BRPM | SYSTOLIC BLOOD PRESSURE: 140 MMHG | WEIGHT: 156.6 LBS | HEIGHT: 62 IN | HEART RATE: 67 BPM | DIASTOLIC BLOOD PRESSURE: 79 MMHG

## 2022-12-05 DIAGNOSIS — S83.241A ACUTE MEDIAL MENISCUS TEAR OF RIGHT KNEE, INITIAL ENCOUNTER: Primary | ICD-10-CM

## 2022-12-05 LAB
GLUCOSE BLD-MCNC: 136 MG/DL (ref 65–105)
GLUCOSE BLD-MCNC: 159 MG/DL (ref 65–105)

## 2022-12-05 PROCEDURE — 2500000003 HC RX 250 WO HCPCS: Performed by: NURSE ANESTHETIST, CERTIFIED REGISTERED

## 2022-12-05 PROCEDURE — 6370000000 HC RX 637 (ALT 250 FOR IP)

## 2022-12-05 PROCEDURE — 3600000004 HC SURGERY LEVEL 4 BASE: Performed by: ORTHOPAEDIC SURGERY

## 2022-12-05 PROCEDURE — 6360000002 HC RX W HCPCS: Performed by: ORTHOPAEDIC SURGERY

## 2022-12-05 PROCEDURE — 3600000014 HC SURGERY LEVEL 4 ADDTL 15MIN: Performed by: ORTHOPAEDIC SURGERY

## 2022-12-05 PROCEDURE — 7100000010 HC PHASE II RECOVERY - FIRST 15 MIN: Performed by: ORTHOPAEDIC SURGERY

## 2022-12-05 PROCEDURE — 6360000002 HC RX W HCPCS: Performed by: NURSE ANESTHETIST, CERTIFIED REGISTERED

## 2022-12-05 PROCEDURE — 2709999900 HC NON-CHARGEABLE SUPPLY: Performed by: ORTHOPAEDIC SURGERY

## 2022-12-05 PROCEDURE — 6360000002 HC RX W HCPCS

## 2022-12-05 PROCEDURE — 3700000001 HC ADD 15 MINUTES (ANESTHESIA): Performed by: ORTHOPAEDIC SURGERY

## 2022-12-05 PROCEDURE — 82947 ASSAY GLUCOSE BLOOD QUANT: CPT

## 2022-12-05 PROCEDURE — 7100000011 HC PHASE II RECOVERY - ADDTL 15 MIN: Performed by: ORTHOPAEDIC SURGERY

## 2022-12-05 PROCEDURE — 3700000000 HC ANESTHESIA ATTENDED CARE: Performed by: ORTHOPAEDIC SURGERY

## 2022-12-05 PROCEDURE — 7100000000 HC PACU RECOVERY - FIRST 15 MIN: Performed by: ORTHOPAEDIC SURGERY

## 2022-12-05 PROCEDURE — 7100000001 HC PACU RECOVERY - ADDTL 15 MIN: Performed by: ORTHOPAEDIC SURGERY

## 2022-12-05 RX ORDER — DIPHENHYDRAMINE HYDROCHLORIDE 50 MG/ML
12.5 INJECTION INTRAMUSCULAR; INTRAVENOUS
Status: DISCONTINUED | OUTPATIENT
Start: 2022-12-05 | End: 2022-12-05 | Stop reason: HOSPADM

## 2022-12-05 RX ORDER — KETOROLAC TROMETHAMINE 30 MG/ML
INJECTION, SOLUTION INTRAMUSCULAR; INTRAVENOUS PRN
Status: DISCONTINUED | OUTPATIENT
Start: 2022-12-05 | End: 2022-12-05 | Stop reason: SDUPTHER

## 2022-12-05 RX ORDER — MEPERIDINE HYDROCHLORIDE 50 MG/ML
12.5 INJECTION INTRAMUSCULAR; INTRAVENOUS; SUBCUTANEOUS ONCE
Status: COMPLETED | OUTPATIENT
Start: 2022-12-05 | End: 2022-12-05

## 2022-12-05 RX ORDER — MIDAZOLAM HYDROCHLORIDE 1 MG/ML
INJECTION INTRAMUSCULAR; INTRAVENOUS PRN
Status: DISCONTINUED | OUTPATIENT
Start: 2022-12-05 | End: 2022-12-05 | Stop reason: SDUPTHER

## 2022-12-05 RX ORDER — LIDOCAINE HYDROCHLORIDE 10 MG/ML
INJECTION, SOLUTION INFILTRATION; PERINEURAL PRN
Status: DISCONTINUED | OUTPATIENT
Start: 2022-12-05 | End: 2022-12-05 | Stop reason: SDUPTHER

## 2022-12-05 RX ORDER — SODIUM CHLORIDE 0.9 % (FLUSH) 0.9 %
5-40 SYRINGE (ML) INJECTION PRN
Status: DISCONTINUED | OUTPATIENT
Start: 2022-12-05 | End: 2022-12-05 | Stop reason: HOSPADM

## 2022-12-05 RX ORDER — FENTANYL CITRATE 50 UG/ML
INJECTION, SOLUTION INTRAMUSCULAR; INTRAVENOUS PRN
Status: DISCONTINUED | OUTPATIENT
Start: 2022-12-05 | End: 2022-12-05 | Stop reason: SDUPTHER

## 2022-12-05 RX ORDER — HYDROCODONE BITARTRATE AND ACETAMINOPHEN 5; 325 MG/1; MG/1
1 TABLET ORAL ONCE
Status: COMPLETED | OUTPATIENT
Start: 2022-12-05 | End: 2022-12-05

## 2022-12-05 RX ORDER — ONDANSETRON 2 MG/ML
4 INJECTION INTRAMUSCULAR; INTRAVENOUS
Status: DISCONTINUED | OUTPATIENT
Start: 2022-12-05 | End: 2022-12-05 | Stop reason: HOSPADM

## 2022-12-05 RX ORDER — PROPOFOL 10 MG/ML
INJECTION, EMULSION INTRAVENOUS PRN
Status: DISCONTINUED | OUTPATIENT
Start: 2022-12-05 | End: 2022-12-05 | Stop reason: SDUPTHER

## 2022-12-05 RX ORDER — SODIUM CHLORIDE 9 MG/ML
INJECTION, SOLUTION INTRAVENOUS CONTINUOUS
Status: DISCONTINUED | OUTPATIENT
Start: 2022-12-05 | End: 2022-12-05 | Stop reason: HOSPADM

## 2022-12-05 RX ORDER — SODIUM CHLORIDE 0.9 % (FLUSH) 0.9 %
5-40 SYRINGE (ML) INJECTION EVERY 12 HOURS SCHEDULED
Status: DISCONTINUED | OUTPATIENT
Start: 2022-12-05 | End: 2022-12-05 | Stop reason: HOSPADM

## 2022-12-05 RX ORDER — SODIUM CHLORIDE, SODIUM LACTATE, POTASSIUM CHLORIDE, CALCIUM CHLORIDE 600; 310; 30; 20 MG/100ML; MG/100ML; MG/100ML; MG/100ML
INJECTION, SOLUTION INTRAVENOUS CONTINUOUS
Status: DISCONTINUED | OUTPATIENT
Start: 2022-12-05 | End: 2022-12-05 | Stop reason: HOSPADM

## 2022-12-05 RX ORDER — HYDROCODONE BITARTRATE AND ACETAMINOPHEN 5; 325 MG/1; MG/1
TABLET ORAL
Status: COMPLETED
Start: 2022-12-05 | End: 2022-12-05

## 2022-12-05 RX ORDER — HYDROCODONE BITARTRATE AND ACETAMINOPHEN 5; 325 MG/1; MG/1
1 TABLET ORAL EVERY 6 HOURS PRN
Qty: 20 TABLET | Refills: 0 | Status: SHIPPED | OUTPATIENT
Start: 2022-12-05 | End: 2022-12-10

## 2022-12-05 RX ORDER — MORPHINE SULFATE 2 MG/ML
1 INJECTION, SOLUTION INTRAMUSCULAR; INTRAVENOUS EVERY 5 MIN PRN
Status: DISCONTINUED | OUTPATIENT
Start: 2022-12-05 | End: 2022-12-05 | Stop reason: HOSPADM

## 2022-12-05 RX ORDER — ROPIVACAINE HYDROCHLORIDE 5 MG/ML
INJECTION, SOLUTION EPIDURAL; INFILTRATION; PERINEURAL PRN
Status: DISCONTINUED | OUTPATIENT
Start: 2022-12-05 | End: 2022-12-05 | Stop reason: ALTCHOICE

## 2022-12-05 RX ORDER — MEPERIDINE HYDROCHLORIDE 50 MG/ML
INJECTION INTRAMUSCULAR; INTRAVENOUS; SUBCUTANEOUS
Status: COMPLETED
Start: 2022-12-05 | End: 2022-12-05

## 2022-12-05 RX ORDER — ONDANSETRON 2 MG/ML
INJECTION INTRAMUSCULAR; INTRAVENOUS PRN
Status: DISCONTINUED | OUTPATIENT
Start: 2022-12-05 | End: 2022-12-05 | Stop reason: SDUPTHER

## 2022-12-05 RX ORDER — SODIUM CHLORIDE 9 MG/ML
INJECTION, SOLUTION INTRAVENOUS PRN
Status: DISCONTINUED | OUTPATIENT
Start: 2022-12-05 | End: 2022-12-05 | Stop reason: HOSPADM

## 2022-12-05 RX ORDER — DEXAMETHASONE SODIUM PHOSPHATE 10 MG/ML
INJECTION, SOLUTION INTRAMUSCULAR; INTRAVENOUS PRN
Status: DISCONTINUED | OUTPATIENT
Start: 2022-12-05 | End: 2022-12-05 | Stop reason: SDUPTHER

## 2022-12-05 RX ORDER — SODIUM CHLORIDE 9 MG/ML
25 INJECTION, SOLUTION INTRAVENOUS PRN
Status: DISCONTINUED | OUTPATIENT
Start: 2022-12-05 | End: 2022-12-05 | Stop reason: HOSPADM

## 2022-12-05 RX ADMIN — MEPERIDINE HYDROCHLORIDE 12.5 MG: 50 INJECTION, SOLUTION INTRAMUSCULAR; INTRAVENOUS; SUBCUTANEOUS at 11:37

## 2022-12-05 RX ADMIN — FENTANYL CITRATE 25 MCG: 50 INJECTION, SOLUTION INTRAMUSCULAR; INTRAVENOUS at 11:00

## 2022-12-05 RX ADMIN — ONDANSETRON 4 MG: 2 INJECTION INTRAMUSCULAR; INTRAVENOUS at 11:13

## 2022-12-05 RX ADMIN — MEPERIDINE HYDROCHLORIDE 12.5 MG: 50 INJECTION INTRAMUSCULAR; INTRAVENOUS; SUBCUTANEOUS at 11:37

## 2022-12-05 RX ADMIN — HYDROCODONE BITARTRATE AND ACETAMINOPHEN 1 TABLET: 5; 325 TABLET ORAL at 12:25

## 2022-12-05 RX ADMIN — Medication 0.5 MG: at 11:59

## 2022-12-05 RX ADMIN — FENTANYL CITRATE 50 MCG: 50 INJECTION, SOLUTION INTRAMUSCULAR; INTRAVENOUS at 11:09

## 2022-12-05 RX ADMIN — HYDROMORPHONE HYDROCHLORIDE 0.5 MG: 1 INJECTION, SOLUTION INTRAMUSCULAR; INTRAVENOUS; SUBCUTANEOUS at 11:59

## 2022-12-05 RX ADMIN — KETOROLAC TROMETHAMINE 30 MG: 30 INJECTION, SOLUTION INTRAMUSCULAR at 11:14

## 2022-12-05 RX ADMIN — FENTANYL CITRATE 25 MCG: 50 INJECTION, SOLUTION INTRAMUSCULAR; INTRAVENOUS at 10:56

## 2022-12-05 RX ADMIN — LIDOCAINE HYDROCHLORIDE 40 MG: 10 INJECTION, SOLUTION INFILTRATION; PERINEURAL at 10:52

## 2022-12-05 RX ADMIN — MIDAZOLAM 2 MG: 1 INJECTION INTRAMUSCULAR; INTRAVENOUS at 10:49

## 2022-12-05 RX ADMIN — PROPOFOL 200 MG: 10 INJECTION, EMULSION INTRAVENOUS at 10:52

## 2022-12-05 RX ADMIN — DEXAMETHASONE SODIUM PHOSPHATE 10 MG: 10 INJECTION, SOLUTION INTRAMUSCULAR; INTRAVENOUS at 11:00

## 2022-12-05 RX ADMIN — SODIUM CHLORIDE: 9 INJECTION, SOLUTION INTRAVENOUS at 11:22

## 2022-12-05 ASSESSMENT — PAIN SCALES - GENERAL
PAINLEVEL_OUTOF10: 4
PAINLEVEL_OUTOF10: 5
PAINLEVEL_OUTOF10: 3
PAINLEVEL_OUTOF10: 3
PAINLEVEL_OUTOF10: 7
PAINLEVEL_OUTOF10: 7

## 2022-12-05 ASSESSMENT — PAIN DESCRIPTION - LOCATION
LOCATION: KNEE

## 2022-12-05 ASSESSMENT — PAIN DESCRIPTION - ORIENTATION
ORIENTATION: RIGHT

## 2022-12-05 ASSESSMENT — PAIN DESCRIPTION - FREQUENCY: FREQUENCY: CONTINUOUS

## 2022-12-05 ASSESSMENT — PAIN - FUNCTIONAL ASSESSMENT
PAIN_FUNCTIONAL_ASSESSMENT: 0-10
PAIN_FUNCTIONAL_ASSESSMENT: PREVENTS OR INTERFERES SOME ACTIVE ACTIVITIES AND ADLS
PAIN_FUNCTIONAL_ASSESSMENT: ACTIVITIES ARE NOT PREVENTED

## 2022-12-05 ASSESSMENT — PAIN DESCRIPTION - DESCRIPTORS
DESCRIPTORS: ACHING
DESCRIPTORS: ACHING;THROBBING

## 2022-12-05 ASSESSMENT — PAIN DESCRIPTION - PAIN TYPE: TYPE: SURGICAL PAIN

## 2022-12-05 ASSESSMENT — LIFESTYLE VARIABLES: SMOKING_STATUS: 1

## 2022-12-05 NOTE — H&P
ORTHOPEDIC PATIENT EVALUATION      HPI / Chief Complaint  Vicky Riley is a 47 y.o. female who presents for right knee arthroscopy. Patient MRI consistent with a medial meniscus tear    Past Medical History  Lenora  has a past medical history of Anomalous coronary artery origin, Anxiety, Asthma, Back pain, Cirrhosis ([address] DDD (degenerative disc disease), cervical, DDD (degenerative disc disease), lumbar, Diabetes mellitus ([address] Fatty liver, History of blood transfusion, Mitral prolapse, Pneumonia, Prolonged emergence from general anesthesia, Thoracic outlet syndrome, and Thyroid goiter. Past Surgical History  Lenora  has a past surgical history that includes Tubal ligation;  section; Cervical spine surgery; hernia repair; Colonoscopy; Endoscopy, colon, diagnostic; bronchoscopy; Cardiac surgery; Tonsillectomy; pr sono guide needle biopsy; Finger trigger release (Right, 10/09/2019); liver biopsy; and back surgery. Current Medications  Current Facility-Administered Medications   Medication Dose Route Frequency Provider Last Rate Last Admin    0.9 % sodium chloride infusion   IntraVENous Continuous Marianela Holman MD        lactated ringers infusion   IntraVENous Continuous Marianela Holman MD        sodium chloride flush 0.9 % injection 5-40 mL  5-40 mL IntraVENous 2 times per day Marianela Holman MD        sodium chloride flush 0.9 % injection 5-40 mL  5-40 mL IntraVENous PRN Marianela Holman MD        0.9 % sodium chloride infusion   IntraVENous PRN Marianela Holman MD         Facility-Administered Medications Ordered in Other Encounters   Medication Dose Route Frequency Provider Last Rate Last Admin    nitroGLYCERIN (NITROSTAT) SL tablet 0.4 mg  0.4 mg SubLINGual Q5 Min PRN Jaymie Rainey MD   0.4 mg at 16 0857    0.9 % sodium chloride infusion   IntraVENous Continuous Jaymie Rainey MD 20 mL/hr at 16 0857 New Bag at 16 0857       Allergies  Allergies have been reviewed.   eLnora is allergic to vancomycin, bactrim [sulfamethoxazole-trimethoprim], biaxin [clarithromycin], and ceclor [cefaclor]. Social History  Lenora  reports that she has been smoking cigarettes. She has a 10.00 pack-year smoking history. She has never used smokeless tobacco. She reports that she does not drink alcohol and does not use drugs. Family History  Lenora's family history includes Diabetes in her mother and paternal grandmother. Review of Systems   History obtained from the patient. REVIEW OF SYSTEMS:   Constitution: negative for fever, chills, weight loss or malaise   Musculoskeletal: As noted in the HPI   Neurologic: As noted in the HPI    Physical Exam  /67   Pulse 67   Temp (!) 96.5 °F (35.8 °C) (Tympanic)   Resp 18   Ht 5' 2\" (1.575 m)   Wt 156 lb 9.6 oz (71 kg)   LMP 07/19/2019   SpO2 97%   BMI 28.64 kg/m²    General Appearance: alert, well appearing, and in no distress  Mental Status: alert, oriented to person, place, and time  Positive Oliver's with decreased range of motion right knee neurovascular intact. Imaging Studies  MRI obtained in September 2022 was consistent with a tear of the posterior horn of the medial meniscus. Diagnostics and Labs  Relevant diagnostic, laboratory and radiological studies have been reviewed in the Electronic Medical Record. Assessment and Plan  [address] [place] [address] Jenniffer is a 47 y.o. old female who presents for arthroscopic partial medial meniscectomy of her right knee. She has consented with good comprehension of all risk and benefits    This note is created with the assistance of a speech recognition program.  While intending to generate adocument that actually reflects the content of the visit, the document can still have some errors including those of syntax and sound a like substitutions which may escape proof reading. It such instances, actual meaningcan be extrapolated by contextual diversion.

## 2022-12-05 NOTE — ANESTHESIA PRE PROCEDURE
Department of Anesthesiology  Preprocedure Note       Name:  Rex Betancourt   Age:  47 y.o.  :  1970                                          MRN:  [id number]         Date:  2022      Surgeon: Perlita Arias):  Matias Patton MD    Procedure: Procedure(s):  RIGHT KNEE ARTHROSCOPY PARTIAL MEDIAL MENISCECTOMY    Medications prior to admission:   Prior to Admission medications    Medication Sig Start Date End Date Taking? Authorizing Provider   insulin aspart (NOVOLOG FLEXPEN) 100 UNIT/ML injection pen Indications: type 2 diabetes mellitus. 22   Historical Provider, MD   clobetasol (TEMOVATE) 0.05 % cream Apply topically 2 times daily. Patient taking differently: Apply topically 2 times daily.  PRN 22   Leticia Villar MD   ibuprofen (ADVIL;MOTRIN) 800 MG tablet Take 1 tablet by mouth every 8 hours as needed for Pain 22  Leticia Villar MD   albuterol sulfate HFA (VENTOLIN HFA) 108 (90 Base) MCG/ACT inhaler Inhale 2 puffs into the lungs every 6 hours as needed for Wheezing 22   Leticia Villar MD   JANUVIA 100 MG tablet TAKE 1 TABLET BY MOUTH EVERY DAY 10/19/20   Historical Provider, MD   rosuvastatin (CRESTOR) 10 MG tablet TAKE 1 TABLET BY MOUTH EVERY DAY 10/21/20   Historical Provider, MD   hydrocortisone 2.5 % cream Apply once daily to corners of mouth 19   Juan Manuel Rodríguez MD   ketoconazole (NIZORAL) 2 % cream Apply once daily to corners of mouth 19   Chey Joshi MD   pantoprazole sodium (PROTONIX) 40 MG PACK packet Take 40 mg by mouth daily     Historical Provider, MD   metoprolol succinate (TOPROL XL) 25 MG extended release tablet Take 2 tablets by mouth daily  Patient taking differently: Take 50 mg by mouth daily 50 mg in the morning, 25 mg at night 10/25/17   Leticia Villar MD   ranolazine (RANEXA) 1000 MG extended release tablet Take 1,000 mg by mouth    Historical Provider, MD   insulin glargine (TOUJEO SOLOSTAR) 300 UNIT/ML injection pen Inject 23 Units into the skin daily 16 units BID as needed    Historical Provider, MD   nitroGLYCERIN (NITROSTAT) 0.4 MG SL tablet Place 0.4 mg under the tongue every 5 minutes as needed for Chest pain Dissolve 1 tab under tongue at first sign of chest pain. May repeat every 5 minutes until relief is obtained. If pain persists after taking 3 tabs in a 15-minute period, or the pain is different than is typically experienced, call 9-1-1 immediately. Patient not taking: Reported on 12/5/2022    Historical Provider, MD   TRUETEST TEST strip  2/26/16   Historical Provider, MD   TRUEPLUS LANCETS 33G [address] [facility]  2/26/16   Historical Provider, MD   albuterol (PROVENTIL) (2.5 MG/3ML) 0.083% nebulizer solution Take 3 mLs by nebulization every 6 hours as needed for Wheezing 3/16/16   Juan Aguirre MD       Current medications:    Current Facility-Administered Medications   Medication Dose Route Frequency Provider Last Rate Last Admin    0.9 % sodium chloride infusion   IntraVENous Continuous Manjeet Espinal MD        lactated ringers infusion   IntraVENous Continuous Manjeet Espinal MD        sodium chloride flush 0.9 % injection 5-40 mL  5-40 mL IntraVENous 2 times per day Manjeet Espinal MD        sodium chloride flush 0.9 % injection 5-40 mL  5-40 mL IntraVENous PRN Manjeet Espinal MD        0.9 % sodium chloride infusion   IntraVENous PRN Manjeet Espinal MD         Facility-Administered Medications Ordered in Other Encounters   Medication Dose Route Frequency Provider Last Rate Last Admin    nitroGLYCERIN (NITROSTAT) SL tablet 0.4 mg  0.4 mg SubLINGual Q5 Min PRN Danny Gomez MD   0.4 mg at 09/16/16 0857    0.9 % sodium chloride infusion   IntraVENous Continuous Danny Gomez MD 20 mL/hr at 09/16/16 0857 New Bag at 09/16/16 0857       Allergies:     Allergies   Allergen Reactions    Vancomycin      Itching, burning    Bactrim [Sulfamethoxazole-Trimethoprim] Rash    Biaxin [Clarithromycin] Rash    Ceclor [Cefaclor] Rash       Problem List:    Patient Active Problem List   Diagnosis Code    Fatigue R53.83    GERD (gastroesophageal reflux disease) K21.9    Obesity (BMI 30-39. 9) E66.9    Diabetes mellitus ([facility] E11.9    Snoring R06.83    Elevated liver enzymes R74.8    Unstable angina (HCC) I20.0    Positive cardiac stress test R94.39    Anomalous coronary artery origin Q24.5    Trigger thumb of right hand M65.311       Past Medical History:        Diagnosis Date    Anomalous coronary artery origin 2016    Anxiety     Asthma     Back pain     Cir[address]osis ([facility] [address]     DDD (degenerative disc disease), cervical     DDD (degenerative disc disease), lumbar     Diabetes mellitus ([facility] [address]     Fatty liver     History of blood transfusion     Mitral prolapse     Pneumonia     Prolonged emergence from general anesthesia     Thoracic outlet syndrome     Thyroid goiter     had biopsy       Past Surgical History:        Procedure Laterality Date    BACK SURGERY      BRONCHOSCOPY      CARDIAC SURGERY      x 1    CERVICAL SPINE SURGERY      plates placed     SECTION      x 2    COLONOSCOPY      ENDOSCOPY, COLON, DIAGNOSTIC      EGD    FINGER TRIGGER RELEASE Right 10/09/2019    THUMB TRIGGER RELEASE performed by Ramón Nieto MD at [address] [address]      abdomen    LIVER BIOPSY      NJ SONO GUIDE NEEDLE BIOPSY      TONSILLECTOMY      TUBAL LIGATION         Social History:    Social History     Tobacco Use    Smoking status: Every Day     Packs/day: 0.50     Years: 20.00     Pack years: 10.00     Types: Cigarettes    Smokeless tobacco: Never   Substance Use Topics    Alcohol use: No     Alcohol/week: 0.0 standard drinks                                Ready to quit: Not Answered  Counseling given: Not Answered      Vital Signs (Current):   Vitals:    22 0914   BP: 133/67   Pulse: 67   Resp: 18   Temp: (!) 96.5 °F (35.8 °C)   TempSrc: Tympanic   SpO2: 97%   Weight: 156 lb 9.6 oz (71 kg)   Height: 5' 2\" (1.575 m) BP Readings from Last 3 Encounters:   12/05/22 133/67   11/29/22 138/77   08/19/22 124/77       NPO Status: Time of last liquid consumption: 0740                        Time of last solid consumption: 0000                        Date of last liquid consumption: 12/05/22                        Date of last solid food consumption: 12/04/22    BMI:   Wt Readings from Last 3 Encounters:   12/05/22 156 lb 9.6 oz (71 kg)   11/29/22 155 lb (70.3 kg)   09/16/22 157 lb (71.2 kg)     Body mass index is 28.64 kg/m². CBC:   Lab Results   Component Value Date/Time    WBC 7.2 11/29/2022 10:05 AM    RBC 4.56 11/29/2022 10:05 AM    RBC 4.33 05/12/2012 03:52 PM    HGB 13.2 11/29/2022 10:05 AM    HCT 39.9 11/29/2022 10:05 AM    MCV 87.5 11/29/2022 10:05 AM    RDW 13.2 11/29/2022 10:05 AM     11/29/2022 10:05 AM     05/12/2012 03:52 PM       CMP:   Lab Results   Component Value Date/Time     11/29/2022 10:05 AM    K 4.2 11/29/2022 10:05 AM     11/29/2022 10:05 AM    CO2 26 11/29/2022 10:05 AM    BUN 11 11/29/2022 10:05 AM    CREATININE 0.84 11/29/2022 10:05 AM    GFRAA >60 07/14/2022 08:40 AM    LABGLOM >60 11/29/2022 10:05 AM    GLUCOSE 167 11/29/2022 10:05 AM    GLUCOSE 234 11/30/2018 03:22 PM    PROT 7.2 11/17/2022 08:56 AM    PROT 7.0 09/15/2015 12:00 AM    CALCIUM 9.4 11/29/2022 10:05 AM    BILITOT 0.5 11/17/2022 08:56 AM    ALKPHOS 69 11/17/2022 08:56 AM    AST 17 11/17/2022 08:56 AM    ALT 15 11/17/2022 08:56 AM       POC Tests: No results for input(s): POCGLU, POCNA, POCK, POCCL, POCBUN, POCHEMO, POCHCT in the last 72 hours.     Coags:   Lab Results   Component Value Date/Time    PROTIME 10.1 11/17/2022 08:56 AM    INR 1.0 11/17/2022 08:56 AM    APTT 22.2 07/14/2022 08:40 AM       HCG (If Applicable):   Lab Results   Component Value Date    HCG NEGATIVE 10/09/2019        ABGs: No results found for: PHART, PO2ART, ZLE7JZK, FWZ7MSW, BEART, T4YEJWAM     Type & Screen (If Applicable):  No results found for: LABABO, LABRH    Drug/Infectious Status (If Applicable):  No results found for: HIV, HEPCAB    COVID-19 Screening (If Applicable):   Lab Results   Component Value Date/Time    COVID19 Not Detected 02/01/2021 01:51 PM           Anesthesia Evaluation  Patient summary reviewed and Nursing notes reviewed no history of anesthetic complications:   Airway: Mallampati: II  TM distance: >3 FB   Neck ROM: full  Mouth opening: > = 3 FB   Dental: normal exam         Pulmonary:normal exam  breath sounds clear to auscultation  (+) pneumonia: resolved,  asthma: current smoker                           Cardiovascular:  Exercise tolerance: no interval change,   (+) angina: no interval change, CAD: no interval change,       ECG reviewed  Rhythm: regular  Rate: normal    Stress test reviewed                Neuro/Psych:   Negative Neuro/Psych ROS              GI/Hepatic/Renal:   (+) GERD: no interval change, liver disease:,          ROS comment: Cirrhosis. Endo/Other:    (+) DiabetesType II DM, no interval change, , .                 Abdominal:       Abdomen: soft. Vascular: negative vascular ROS. Other Findings:           Anesthesia Plan      general     ASA 3     (LMA)  Induction: intravenous. Anesthetic plan and risks discussed with patient. Plan discussed with CRNA.                     Driss Marquez MD   12/5/2022

## 2022-12-05 NOTE — ANESTHESIA POSTPROCEDURE EVALUATION
POST- ANESTHESIA EVALUATION       Pt Name: Rex Betancourt  MRN: [id number]  YOB: 1970  Date of evaluation: 12/5/2022  Time:  1:13 PM      BP (!) 140/79   Pulse 67   Temp 97 °F (36.1 °C) (Temporal)   Resp 13   Ht 5' 2\" (1.575 m)   Wt 156 lb 9.6 oz (71 kg)   LMP 07/19/2019   SpO2 97%   BMI 28.64 kg/m²      Consciousness Level  Awake  Cardiopulmonary Status  Stable  Pain Adequately Treated YES  Nausea / Vomiting  NO  Adequate Hydration  YES  Anesthesia Related Complications NONE      Electronically signed by Richi Lopez MD on 12/5/2022 at 1:13 PM       Department of Anesthesiology  Postprocedure Note    Patient: Rex Betancourt  MRN: [id number]  YOB: 1970  Date of evaluation: 12/5/2022      Procedure Summary     Date: 12/05/22 Room / Location: [address]    Anesthesia Start: 4700 Anesthesia Stop: 6613    Procedure: RIGHT KNEE ARTHROSCOPY PARTIAL MEDIAL MENISCECTOMY (Right: Knee) Diagnosis:       Acute medial meniscus tear of right knee, initial encounter      (Acute medial meniscus tear of right knee, initial encounter [H74.035X])    Surgeons: Matias Patton MD Responsible Provider: Richi Lopez MD    Anesthesia Type: general ASA Status: 3          Anesthesia Type: No value filed.     Daniel Phase I: Daniel Score: 10    Daniel Phase II: Daniel Score: 10      Anesthesia Post Evaluation

## 2022-12-16 ENCOUNTER — OFFICE VISIT (OUTPATIENT)
Dept: ORTHOPEDIC SURGERY | Age: 52
End: 2022-12-16

## 2022-12-16 DIAGNOSIS — Z98.890 S/P RIGHT KNEE ARTHROSCOPY: Primary | ICD-10-CM

## 2022-12-16 PROCEDURE — 99024 POSTOP FOLLOW-UP VISIT: CPT | Performed by: ORTHOPAEDIC SURGERY

## 2022-12-16 NOTE — PROGRESS NOTES
Patient returns t right chai status post right knee arthroscopy with partial (medial/lateral) meniscectomy. Patient has no major complaints other than expected tightness/swelling with ROM. Sharp/stabbing pain has improved. On exam, portal sites are without redness or drainage. No calf tenderness; negative Chantale's sign. Motion is 0-100 degrees. No significant effusion. Assessment  Status post right knee arthroscopy    Plan  Patient given exercises to perform. Patient given activities/ motions to complete. Continue activities at home. Return to work. RTO PRN. Call with any future problems.

## 2023-01-27 ENCOUNTER — OFFICE VISIT (OUTPATIENT)
Dept: ORTHOPEDIC SURGERY | Age: 53
End: 2023-01-27

## 2023-01-27 ENCOUNTER — TELEPHONE (OUTPATIENT)
Dept: FAMILY MEDICINE CLINIC | Age: 53
End: 2023-01-27

## 2023-01-27 VITALS — WEIGHT: 156 LBS | BODY MASS INDEX: 28.71 KG/M2 | HEIGHT: 62 IN | RESPIRATION RATE: 14 BRPM

## 2023-01-27 DIAGNOSIS — Z98.890 S/P RIGHT KNEE ARTHROSCOPY: ICD-10-CM

## 2023-01-27 DIAGNOSIS — S83.231D COMPLEX TEAR OF MEDIAL MENISCUS OF RIGHT KNEE AS CURRENT INJURY, SUBSEQUENT ENCOUNTER: Primary | ICD-10-CM

## 2023-01-27 RX ORDER — LIDOCAINE HYDROCHLORIDE 10 MG/ML
2 INJECTION, SOLUTION INFILTRATION; PERINEURAL ONCE
Status: COMPLETED | OUTPATIENT
Start: 2023-01-27 | End: 2023-01-27

## 2023-01-27 RX ORDER — BETAMETHASONE SODIUM PHOSPHATE AND BETAMETHASONE ACETATE 3; 3 MG/ML; MG/ML
12 INJECTION, SUSPENSION INTRA-ARTICULAR; INTRALESIONAL; INTRAMUSCULAR; SOFT TISSUE ONCE
Status: COMPLETED | OUTPATIENT
Start: 2023-01-27 | End: 2023-01-27

## 2023-01-27 RX ORDER — BUPIVACAINE HYDROCHLORIDE 5 MG/ML
2 INJECTION, SOLUTION PERINEURAL ONCE
Status: COMPLETED | OUTPATIENT
Start: 2023-01-27 | End: 2023-01-27

## 2023-01-27 RX ADMIN — BETAMETHASONE SODIUM PHOSPHATE AND BETAMETHASONE ACETATE 12 MG: 3; 3 INJECTION, SUSPENSION INTRA-ARTICULAR; INTRALESIONAL; INTRAMUSCULAR; SOFT TISSUE at 12:58

## 2023-01-27 RX ADMIN — BUPIVACAINE HYDROCHLORIDE 10 MG: 5 INJECTION, SOLUTION PERINEURAL at 12:59

## 2023-01-27 RX ADMIN — LIDOCAINE HYDROCHLORIDE 2 ML: 10 INJECTION, SOLUTION INFILTRATION; PERINEURAL at 13:00

## 2023-01-27 NOTE — TELEPHONE ENCOUNTER
----- Message from Soniya Veras sent at 1/26/2023  1:50 PM EST -----  Subject: Message to Provider    QUESTIONS  Information for Provider? Patient called in regarding grandson being   diagnosed with a tapeworm and she has been exposed. She doesn't have any   symptoms but would like to have something sent to [address] [place] [address], [address] [place] [address] [phone]  ---------------------------------------------------------------------------  --------------  Cyrus QUINTERO  [phone]; OK to leave message on voicemail  ---------------------------------------------------------------------------  --------------  SCRIPT ANSWERS  Relationship to Patient?  Self

## 2023-01-27 NOTE — TELEPHONE ENCOUNTER
Is it a tapeworm or pinworm? The treatment is quite liver toxic so I am not sure if the patient needs to have treatment if she does not have any symptoms. Please let me know what she thinks. Thank you.

## 2023-01-27 NOTE — PROGRESS NOTES
[address], [address] [facility] [address], [address] [place] [address], [id number] [place] [address]           Dept Phone: [phone]           Dept Fax:  [phone] [facility] [place]          Dept Phone: [phone]           Dept Fax:  [phone]      Chief Compliant:  Chief Complaint   Patient presents with    Knee Pain     Right; S/P PMM 12/5/22        History of Present Illness:  Lenora returns today. This is a 47 y.o. female who presents to the clinic today for follow up of continued right knee pain and swelling following right knee arthroscopy partial medial meniscectomy on 12/05/2022. Patient states unfortunately over the last 6 weeks she has continued to have swelling and pain in this right knee. She does not recall any specific injury or trauma states this has been present since surgery but has worsened over the last 2 weeks or so she has attempted to become more active. States swelling can worsen throughout the day but seems to be always there compared to contralateral do. She denies any radiation pain down the leg no numbness or tingling. Review of Systems   Constitutional: Negative for fever, chills, sweats, recent illness, or recent injury. Neurological: Negative for headaches, numbness, or weakness. Integumentary: Negative for rash, itching, ecchymosis, abrasions, or laceration. Musculoskeletal: Positive for Knee Pain (Right; S/P PMM 12/5/22)       Physical Exam:  Constitutional: Patient is oriented to person, place, and time. Patient appears well-developed and well nourished. Musculoskeletal:    Right Knee:     Skin: warm and dry, no rash or erythema  Vasculature: 2+ pedal pulses bilaterally  Neuro: Sensation grossly intact to light touch diffusely  Alignment: Normal  Tenderness: Moderate tenderness to the medial joint line.   Severe tenderness to the pes bursa and the medial hamstring tendon. Effusion: Moderate    ROM: (Degrees)       A P       Extension  -3 -3       Flexion   85 95       Crepitation  No       Muscle strength:         Flexion   4+      Extension  4      SLR   4        Extensor lag   y          Special testing:  y    Pain with deep knee flexion     y    Patellar grind       n    Patellar apprehension      n    Patellar glide         n    Lachman       n    Anterior drawer      n    Pivot shift       n    Posterior drawer      n    Dial test       n    Posterolateral drawer      n    Posterior Sag       n    MCL        n    LCL          y    Medial joint line tenderness     n    Lateral joint line tenderness     n    McMurrey's         Neurological: Patient is alert and oriented to person, place, and time. Normal strenght. No sensory deficit. Skin: Skin is warm and dry  Psychiatric: Behavior is normal. Thought content normal.  Nursing note and vitals reviewed. Labs and Imaging:     XR taken today:  No results found. Assessment and Plan:  1. Complex tear of medial meniscus of right knee as current injury, subsequent encounter    2. S/P right knee arthroscopy              PLAN:  This is a 47 y.o. female who presents to the clinic today for follow up status post left knee arthroscopy partial medial meniscectomy on 12/5/2022. 1.  Patient has continued to have swelling and pain since surgery. Examination demonstrates no evidence of recurrent medial meniscus tear or new collateral or cruciate ligament injury. Examination patient has large effusion as well as evidence of pes bursitis. 2.  Recommend referral to physical therapy for strengthening and range of motion due to weakness and continued pain. 3.  Given patient's effusion I do believe she would benefit from aspiration and Celestone injection which she was agreeable with this is given as outlined below.   4.  Patient tolerated injection well educated on postinjection care. 5.  Aspirate revealed 20 cc of clear, straw-colored synovial fluid. No evidence of purulence or blood    Procedure Note: Right knee aspiration and Celestone Injection   An informed verbal consent for the procedure was obtained and risks including, but not limited to: allergy to medications, injection, bleeding, stiffness of joint, recurrence of symptoms, loss of function, swelling, drainage, irrigation, need for surgery and pseudo-septic inflammation, were explained to the patient. Also, discussed was the potential for further injections, irrigation and debridement and surgery. Alternate means of treatment have also been discussed with the patient. Following an appropriate discussion with the patient regarding the risks and benefits of the procedure she consented to proceed. her right knee was prepped using betadine solution and alcohol swab. Using aseptic technique and through a superolateral approach, her right knee was injected superficially with 4 cc mixture of 2 cc Lidocaine without epi and 2 cc of Marcaine, aspiration performed revealing 20 cc of clear, straw-colored synovial fluid. and subsequently with 2 cc of 6 mg/mL Celestone into the right knee. A band aid was applied to the injection site. she tolerated the injection with no immediate adverse reactions. Please note that this chart was generated using voice recognition Dragon dictation software. Although every effort was made to ensure the accuracy of this automated transcription, some errors in transcription may have occurred.

## 2023-02-13 ENCOUNTER — HOSPITAL ENCOUNTER (OUTPATIENT)
Facility: CLINIC | Age: 53
Discharge: HOME | End: 2023-02-15
Payer: COMMERCIAL

## 2023-02-13 ENCOUNTER — HOSPITAL ENCOUNTER (OUTPATIENT)
Dept: GENERAL RADIOLOGY | Facility: CLINIC | Age: 53
Discharge: HOME | End: 2023-02-15
Payer: COMMERCIAL

## 2023-02-13 DIAGNOSIS — J40 BRONCHITIS: ICD-10-CM

## 2023-02-13 PROCEDURE — 71046 X-RAY EXAM CHEST 2 VIEWS: CPT

## 2023-02-15 ENCOUNTER — TRANSCRIBE ORDERS (OUTPATIENT)
Dept: ADMINISTRATIVE | Age: 53
End: 2023-02-15

## 2023-02-15 DIAGNOSIS — Z98.1 S/P SPINAL FUSION: Primary | ICD-10-CM

## 2023-02-21 NOTE — PROGRESS NOTES
Dermatology Patient Note  [facility] DERMATOLOGY  [address] [facility]  [address] [place] [address]  Dept: [phone]  Dept Fax: [phone]: 11/20/2018   REFERRING PROVIDER: Joyce Shaikh MD      [facility] is a 53 y.o. female  who presents today in the office for:    New Patient (Pt states she has sores around the corner of her mouth. She states its been present for a year. Pt states that she has a knot in the back of head that has been present for 8 weeks. She states that she dyed her hair and has a bump in the back of her head. Pt states her nail bed has lost pigment and became deformed . )      HISTORY OF PRESENT ILLNESS:  Patient presents for sores  Location: corner of mouth  Duration: one year  Symptoms: itching  Course: worsening  Exacerbating factors: drooling at night  Prior treatments: nystatin, not helping    Has several other concerns:  - nail beds are white  - since anemic episode left thumb has been sore and nail deformed. She has tried filing it down.  The skin around is also sore  - crusty itchy spots on forearm  - crusty itchy knot on posterior scalp after dying hair  - dry itchy hands      CURRENT MEDICATIONS:   Current Outpatient Prescriptions   Medication Sig Dispense Refill    Magic Mouthwash (MIRACLE MOUTHWASH) Take 15 mLs by mouth      ibuprofen (ADVIL;MOTRIN) 600 MG tablet Take 600 mg by mouth      levothyroxine (SYNTHROID) 25 MCG tablet       medroxyPROGESTERone (PROVERA) 10 MG tablet Take 10 mg by mouth      Ergocalciferol (VITAMIN D2 PO)       triamcinolone (KENALOG) 0.1 % ointment Apply to rash on hands twice daily 80 g 1    ketoconazole (NIZORAL) 2 % cream Apply qam to corners of mouth and base of fingernail 30 g 2    hydrocortisone 2.5 % cream Apply qpm to corners of mouth and to base of fingernail 60 g 5    SITagliptin (JANUVIA) 25 MG tablet Take 25 mg by mouth daily      pantoprazole sodium (PROTONIX) 40 MG PACK packet Take 40 mg by mouth daily      metoprolol succinate (TOPROL XL) 25 MG extended release tablet Take 2 tablets by mouth daily 60 tablet 3    ranolazine (RANEXA) 1000 MG extended release tablet Take 1,000 mg by mouth      ferrous sulfate (FE TABS) 325 (65 FE) MG EC tablet Take 1 tablet by mouth 2 times daily 90 tablet 3    insulin glargine (TOUJEO SOLOSTAR) 300 UNIT/ML injection pen Inject 20 Units into the skin daily       nitroGLYCERIN (NITROSTAT) 0.4 MG SL tablet Place 0.4 mg under the tongue every 5 minutes as needed for Chest pain Dissolve 1 tab under tongue at first sign of chest pain. May repeat every 5 minutes until relief is obtained. If pain persists after taking 3 tabs in a 15-minute period, or the pain is different than is typically experienced, call 9-1-1 immediately.  clonazePAM (KLONOPIN) 0.5 MG tablet Take 1 tablet by mouth 2 times daily as needed for Anxiety 20 tablet 0    TRUETEST TEST strip   0    TRUEPLUS LANCETS 33G MISC   0    albuterol (PROVENTIL) (2.5 MG/3ML) 0.083% nebulizer solution Take 3 mLs by nebulization every 6 hours as needed for Wheezing 120 each 3    albuterol (PROVENTIL HFA;VENTOLIN HFA) 108 (90 BASE) MCG/ACT inhaler Inhale 2 puffs into the lungs every 6 hours as needed. 1 Inhaler 3    fluticasone-salmeterol (ADVAIR DISKUS) 500-50 MCG/DOSE diskus inhaler Inhale 1 puff into the lungs every 12 hours 60 each 3    esomeprazole (NEXIUM) 40 MG delayed release capsule Take 1 capsule by mouth daily 30 capsule 3     No current facility-administered medications for this visit.       Facility-Administered Medications Ordered in Other Visits   Medication Dose Route Frequency Provider Last Rate Last Dose    nitroGLYCERIN (NITROSTAT) SL tablet 0.4 mg  0.4 mg Sublingual Q5 Min PRN Jennifer Berrios MD   0.4 mg at 09/16/16 0857    0.9 % sodium chloride infusion   Intravenous Continuous Jennifer Berrios MD 20 mL/hr at 09/16/16 0857         ALLERGIES:   Allergies   Allergen Reactions    Bactrim Keratosis on the forearm and posterior scalp were treated once with liquid nitrogen to achieve a 2-3 mm freeze border. RTC 6 months    Approximately 40 minutes were spent on patient encounter, of which at least 50% were face-to-face counseling. Patient Instructions   1. Apply Ketoconazole in the morning. Apply to Mouth and Finger (to the base of the nail by the cuticle). 2. Apply Hydrocortisone at night. Apply to Mouth and Finger (to the base of the nail by the cuticle). 3. Apply Triamcinolone twice a day to hands  4. Consider referral to Podiatrist   5. Complete lab work  6. Follow up in 6 months. 7. Start using Renti- A over the counter on the face       Hand washing and moisturizin. Use lukewarm water  2. Use as little soap as necessary. Dove bar soap or cetaphil liquid cleansers are safe. 3. After washing, pat hands dry, especially between the fingers  4. Within 3 minutes of drying hands, apply Vaseline or Aquaphor ointment. It is helpful to have a supply next to every sink, next to the bed, in the car, and at work. 5. Moisturizers should be applied to the hands at least 10 times per day. 6. Do not use washcloths and do not rub or scrub the hands    When hands must be in water:  1. Wear cotton gloves under vinyl gloves  2. Do not use hot water. 3. Restrict hand exposure to water to less than 15 minutes at a time. 4. Use running water rather than submerge hands into water. Seborrheic Keratosis  Seborrheic keratoses are common benign growths of unknown cause seen in adults due to a thickening of an area of the top skin layer. Who's At Risk  Although they can occur anytime after puberty, almost everyone over 48 has one or more of these and they increase in number with age. Some families have an inherited tendency to grow multiple lesions. Men and women are equally as likely to develop seborrheic keratoses.  Dark-skinned people are less affected than those with light skin; a variant Stage 1: Number Of Blocks?: 4

## 2023-02-22 ENCOUNTER — HOSPITAL ENCOUNTER (OUTPATIENT)
Dept: MRI IMAGING | Facility: CLINIC | Age: 53
Discharge: HOME | End: 2023-02-24
Payer: COMMERCIAL

## 2023-02-22 DIAGNOSIS — Z98.1 S/P SPINAL FUSION: ICD-10-CM

## 2023-02-22 LAB — POC CREATININE: 0.8 MG/DL (ref 0.6–1.4)

## 2023-02-22 PROCEDURE — 6360000004 HC RX CONTRAST MEDICATION: Performed by: NEUROLOGICAL SURGERY

## 2023-02-22 PROCEDURE — 82565 ASSAY OF CREATININE: CPT

## 2023-02-22 PROCEDURE — 72156 MRI NECK SPINE W/O & W/DYE: CPT

## 2023-02-22 PROCEDURE — A9579 GAD-BASE MR CONTRAST NOS,1ML: HCPCS | Performed by: NEUROLOGICAL SURGERY

## 2023-02-22 RX ORDER — SODIUM CHLORIDE 0.9 % (FLUSH) 0.9 %
10 SYRINGE (ML) INJECTION 2 TIMES DAILY
Status: DISCONTINUED | OUTPATIENT
Start: 2023-02-22 | End: 2023-02-25 | Stop reason: HOSPADM

## 2023-02-22 RX ADMIN — GADOTERIDOL 15 ML: 279.3 INJECTION, SOLUTION INTRAVENOUS at 09:50

## 2023-02-24 ENCOUNTER — OFFICE VISIT (OUTPATIENT)
Dept: ORTHOPEDIC SURGERY | Age: 53
End: 2023-02-24

## 2023-02-24 VITALS — BODY MASS INDEX: 28.71 KG/M2 | HEIGHT: 62 IN | WEIGHT: 156 LBS | RESPIRATION RATE: 14 BRPM

## 2023-02-24 DIAGNOSIS — G89.29 CHRONIC PAIN OF RIGHT KNEE: Primary | ICD-10-CM

## 2023-02-24 DIAGNOSIS — M25.561 CHRONIC PAIN OF RIGHT KNEE: Primary | ICD-10-CM

## 2023-02-24 PROCEDURE — 99024 POSTOP FOLLOW-UP VISIT: CPT | Performed by: PHYSICIAN ASSISTANT

## 2023-02-24 RX ORDER — MELOXICAM 15 MG/1
15 TABLET ORAL DAILY
Qty: 90 TABLET | Refills: 0 | Status: SHIPPED | OUTPATIENT
Start: 2023-02-24

## 2023-02-24 NOTE — PROGRESS NOTES
[address], [address] [place], [address] [place] [address], [address] [facility]           Dept Phone: [phone]           Dept Fax:  [phone] [address] [facility]           Latha Doherty          Dept Phone: [phone]           Dept Fax:  [phone]      Chief Compliant:  Chief Complaint   Patient presents with    Knee Pain     right        History of Present Illness:  Darya Lira returns today. This is a 47 y.o. female who presents to the clinic today for follow up of continued right knee pain and swelling following right knee arthroscopy partial medial meniscectomy on 12/5/2022. Patient was last seen by me on 1/27/2023 found to have an effusion and underwent an aspiration and injection at that time. Patient reports that the aspiration/injection only lasted for couple days and her pain and swelling have returned. Despite activity modification and rest she has continued to have significant pain and swelling. She does admit she has not gotten into physical therapy at this time but is anticipating on starting soon. No new injury or trauma denies any joint warmth, redness, fever or chills. Review of Systems   Constitutional: Negative for fever, chills, sweats, recent illness, or recent injury. Neurological: Negative for headaches, numbness, or weakness. Integumentary: Negative for rash, itching, ecchymosis, abrasions, or laceration. Musculoskeletal: Positive for Knee Pain (right)       Physical Exam:  Constitutional: Patient is oriented to person, place, and time. Patient appears well-developed and well nourished. Musculoskeletal:    Right Knee:     Skin: warm and dry, no rash or erythema  Vasculature: 2+ pedal pulses bilaterally  Neuro: Sensation grossly intact to light touch diffusely  Alignment: Normal  Tenderness:  Moderate tenderness to the medial joint line and pes anserinus. no tenderness to the lateral joint line, posterior anterior knee. Effusion: Small    ROM: (Degrees)       A P       Extension  -3 0       Flexion   115 125       Crepitation  No       Muscle strength:         Flexion   5      Extension  5      SLR   5        Extensor lag   y          Special testing:  y    Pain with deep knee flexion     y    Patellar grind       n    Patellar apprehension      n    Patellar glide         n    Lachman       n    Anterior drawer      n    Pivot shift       n    Posterior drawer      n    Dial test       n    Posterolateral drawer      n    Posterior Sag       n    MCL        n    LCL          y    Medial joint line tenderness     n    Lateral joint line tenderness     n    McMurrey's         Neurological: Patient is alert and oriented to person, place, and time. Normal strenght. No sensory deficit. Skin: Skin is warm and dry  Psychiatric: Behavior is normal. Thought content normal.  Nursing note and vitals reviewed. Labs and Imaging:     X-rays taken in clinic and preliminarily reviewed by me 2/24/23:   AP bilateral knees standing lateral view of the right knee compared with preoperative x-rays from 8/4/2022 demonstrate early degenerative narrowing of medial and patellofemoral compartment of the right knee with small suprapatellar joint effusion present. No evidence of acute fracture or other acute osseous abnormality. No significant progression from x-rays on 8/4/2022 preoperatively. Assessment and Plan:  1. Chronic pain of right knee              PLAN:  Gagandeep Zapata is a 47 y.o. old female who presents for continued pain and swelling following right knee arthroscopy/partial medial meniscectomy on 12/5/2022. Unfortunate patient has continued to have swelling and pain in this right knee despite oral steroid and aspiration/injection on 1/27/2023. Examination negative for recurrent medial meniscus tear or internal derangement.     I had a discussion with the patient with regards to the nature and extent of her problem. We also discussed treatment options available to her including non-operative and operative intervention. To this end we discussed use of NSAIDs, cortisone and viscosupplementation injections, weight loss, activity modification, physical therapy, bracing, and use of assistive walking devices. . As outlined above she has attempted treatment to include use of oral steroids and corticosteroid injection. At this time she would like to proceed with initiation of daily NSAID in the form meloxicam 15 mg daily and physical therapy. We did discuss the possibility of Visco injection as well should patient fail to respond as expected to above management. We can submit for prior authorization at patient's request.      Please note that this chart was generated using voice recognition Dragon dictation software. Although every effort was made to ensure the accuracy of this automated transcription, some errors in transcription may have occurred.

## 2023-02-28 ENCOUNTER — OFFICE VISIT (OUTPATIENT)
Dept: NEUROSURGERY | Age: 53
End: 2023-02-28
Payer: COMMERCIAL

## 2023-02-28 VITALS
RESPIRATION RATE: 22 BRPM | SYSTOLIC BLOOD PRESSURE: 156 MMHG | DIASTOLIC BLOOD PRESSURE: 83 MMHG | OXYGEN SATURATION: 98 % | TEMPERATURE: 98.2 F | BODY MASS INDEX: 28.53 KG/M2 | HEART RATE: 76 BPM | WEIGHT: 156 LBS

## 2023-02-28 DIAGNOSIS — M47.12 CERVICAL SPONDYLOSIS WITH MYELOPATHY: ICD-10-CM

## 2023-02-28 DIAGNOSIS — M47.26 OTHER SPONDYLOSIS WITH RADICULOPATHY, LUMBAR REGION: Primary | ICD-10-CM

## 2023-02-28 PROCEDURE — 99204 OFFICE O/P NEW MOD 45 MIN: CPT | Performed by: NEUROLOGICAL SURGERY

## 2023-02-28 NOTE — PROGRESS NOTES
[address]  [facility] [address] [facility] [place] [address]  Dept: [phone]    Patient:  Quang Rhodes  YOB: 1970  Date: 2/28/23    The patient is a 47 y.o. female who presents today for consult of the following problems:     Chief Complaint   Patient presents with    New Patient    Neck Pain             HPI:     Quang Rhodes is a 47 y.o. female on whom neurosurgical consultation was requested by Alyson Rutherford MD for management of axial neck pain as well as right upper extremity numbness tingling dexterity loss. The patient has had 2 prior cervical surgeries remotely. Within the course of the last few months she has noticed progressive issues with dexterity numbness tingling without any sparing of any of the digits. Diffuse right upper extremity symptoms including loss of dexterity including buttoning writing tying shoes and general use of the right arm. She does appear to have relative sparing of the left upper extremity with predominant symptoms on the right. No distinct ataxia saddle anesthesia or bowel bladder incontinence. As of recent in just in June of last year she underwent a lumbar laminectomy for multilevel disc disease along with stenosis. During the course of the surgery she states that she had difficulty with fatigue upright mobility and just generalized bilateral paraspinal ache. Unfortunate she has developed left-sided radiating leg pain that goes around the buttock into the lateral aspect of the thigh terminating at the anterior lateral aspect of the knee that does not go distal.  Denies any numbness of any of the toes in either of her feet.   This appears to be a relatively new symptom in the postoperative period she has gone through physical therapy and has seen her surgeon in follow-up with stated that he believes her leg symptoms may actually be coming from her cervical spine. Nadiya Watkins History:     Past Medical History:   Diagnosis Date    Anomalous coronary artery origin 2016    Anxiety     Asthma     Back pain     Cirrhosis ([address]     DDD (degenerative disc disease), cervical     DDD (degenerative disc disease), lumbar     Diabetes mellitus ([address]     Fatty liver     History of blood transfusion     Mitral prolapse     Pneumonia     Prolonged emergence from general anesthesia     Thoracic outlet syndrome     Thyroid goiter     had biopsy     Past Surgical History:   Procedure Laterality Date    BACK SURGERY      BRONCHOSCOPY      CARDIAC SURGERY      x 1    CERVICAL SPINE SURGERY      plates placed     SECTION      x 2    CHG US GUIDANCE NEEDLE PLACEMENT IMG S&I      COLONOSCOPY      ENDOSCOPY, COLON, DIAGNOSTIC      EGD    FINGER TRIGGER RELEASE Right 10/09/2019    THUMB TRIGGER RELEASE performed by Miguel Mcghee MD at [address] [place] [address]      abdomen    KNEE ARTHROSCOPY Right 2022    RIGHT KNEE ARTHROSCOPY PARTIAL MEDIAL MENISCECTOMY performed by Jill Keenan MD at [facility] OR    LIVER BIOPSY      TONSILLECTOMY      TUBAL LIGATION       Family History   Problem Relation Age of Onset    Diabetes Mother     Diabetes Paternal Grandmother      Current Outpatient Medications on File Prior to Visit   Medication Sig Dispense Refill    meloxicam (MOBIC) 15 MG tablet Take 1 tablet by mouth daily 90 tablet 0    insulin aspart (NOVOLOG FLEXPEN) 100 UNIT/ML injection pen Indications: type 2 diabetes mellitus. clobetasol (TEMOVATE) 0.05 % cream Apply topically 2 times daily. (Patient taking differently: Apply topically 2 times daily.  PRN) 45 g 1    ibuprofen (ADVIL;MOTRIN) 800 MG tablet Take 1 tablet by mouth every 8 hours as needed for Pain 30 tablet 1    albuterol sulfate HFA (VENTOLIN HFA) 108 (90 Base) MCG/ACT inhaler Inhale 2 puffs into the lungs every 6 hours as needed for Wheezing 1 each 1    JANUVIA 100 MG tablet TAKE 1 TABLET BY MOUTH EVERY DAY      rosuvastatin (CRESTOR) 10 MG tablet TAKE 1 TABLET BY MOUTH EVERY DAY      hydrocortisone 2.5 % cream Apply once daily to corners of mouth 60 g 5    ketoconazole (NIZORAL) 2 % cream Apply once daily to corners of mouth 30 g 2    pantoprazole sodium (PROTONIX) 40 MG PACK packet Take 40 mg by mouth daily       metoprolol succinate (TOPROL XL) 25 MG extended release tablet Take 2 tablets by mouth daily (Patient taking differently: Take 50 mg by mouth daily 50 mg in the morning, 25 mg at night) 60 tablet 3    ranolazine (RANEXA) 1000 MG extended release tablet Take 1,000 mg by mouth      insulin glargine (TOUJEO SOLOSTAR) 300 UNIT/ML injection pen Inject 23 Units into the skin daily 16 units BID as needed      TRUETEST TEST strip   0    TRUEPLUS LANCETS 33G MISC   0    albuterol (PROVENTIL) (2.5 MG/3ML) 0.083% nebulizer solution Take 3 mLs by nebulization every 6 hours as needed for Wheezing 120 each 3    nitroGLYCERIN (NITROSTAT) 0.4 MG SL tablet Place 0.4 mg under the tongue every 5 minutes as needed for Chest pain Dissolve 1 tab under tongue at first sign of chest pain. May repeat every 5 minutes until relief is obtained. If pain persists after taking 3 tabs in a 15-minute period, or the pain is different than is typically experienced, call 9-1-1 immediately.   (Patient not taking: No sig reported)       Current Facility-Administered Medications on File Prior to Visit   Medication Dose Route Frequency Provider Last Rate Last Admin    nitroGLYCERIN (NITROSTAT) SL tablet 0.4 mg  0.4 mg SubLINGual Q5 Min PRN Eugenio Bergeron MD   0.4 mg at 09/16/16 0857    0.9 % sodium chloride infusion   IntraVENous Continuous Eugenio Bergeron MD 20 mL/hr at 12/05/22 1049 New Bag at 12/05/22 1122     Social History     Tobacco Use    Smoking status: Every Day     Packs/day: 0.50     Years: 20.00     Pack years: 10.00     Types: Cigarettes    Smokeless tobacco: Never   Vaping Use    Vaping Use: Former Start date: 2/28/2018   Substance Use Topics    Alcohol use: No     Alcohol/week: 0.0 standard drinks    Drug use: No       Allergies   Allergen Reactions    Vancomycin      Itching, burning    Bactrim [Sulfamethoxazole-Trimethoprim] Rash    Biaxin [Clarithromycin] Rash    Ceclor [Cefaclor] Rash       Review of Systems  ROS: Leg pain. Numbness. Physical Exam:      BP (!) 156/83   Pulse 76   Temp 98.2 °F (36.8 °C)   Resp 22   Wt 156 lb (70.8 kg)   LMP 07/19/2019   SpO2 98%   BMI 28.53 kg/m²   Estimated body mass index is 28.53 kg/m² as calculated from the following:    Height as of 2/24/23: 5' 2\" (1.575 m). Weight as of this encounter: 156 lb (70.8 kg). General:  David Avelar is a 47y.o. year old female who appears her stated age. HEENT: Normocephalic atraumatic. Neck supple. Chest: regular rate; pulses equal. Equal chest rise and fall  Abdomen: Soft nondistended.    Ext: DP equal with good capillary refill  Neuro    Mentation  Appropriate affect   oriented    Cranial Nerves:   Pupils equal and reactive to light  Extraocular motion intact  Face symmetric  No dysarthria  v1-3 sensation symmetric, masseter tone symmetric  Hearing symmetric and intact to finger rub    Sensation:   Numbness RUE     Motor  L deltoid 5/5; R deltoid 5/5  L biceps 5/5; R biceps 5/5  L triceps 5/5; R triceps 5/5  L wrist extension 5/5; R wrist extension 5/5  L intrinsics 5/5; R intrinsics 5/5     L iliopsoas 5/5 , R iliopsoas 5/5  L quadriceps 5/5; R quadriceps 5/5  L Dorsiflexion 5/5; R dorsiflexion 5/5  L Plantarflexion 5/5; R plantarflexion 5/5  L EHL 5/5; R EHL 5/5    Reflexes  L Brachioradialis 2+/4; R brachioradialis 2+/4  L Biceps 2+/4; R Biceps 2+/4  L Triceps 2+/4; R Triceps 2+/4  L Patellar 2+/4: R Patellar 2+/4  L Achilles 2+/4; R Achilles 2+/4    hoffmans L: neg  winnie R: neg  Clonus L: neg  Clonus R: neg  Babinski L: up  Babinski R; up    Studies Review:     MRI C -significant discogenic disease at C3-4 as well as C6-7 with broad-based protrusion with cord stenosis and displacement. Assessment and Plan:      1. Other spondylosis with radiculopathy, lumbar region    2. Cervical spondylosis with myelopathy          Plan: I explained the patient that she is having symptoms in the right upper extremity from cervical myelopathy with significant stenosis and adjacent level disease at C3-4 and C6-7. I would like to evaluate her with flexion-extension x-rays and a CT cervical spine to ensure that there is no pseudoarthrosis as well as to ensure that there is no gross radiographic instability of adjacent levels. She did have the x-rays done at [facility] so we will make sure those are imported but we will obtain a CT cervical spine. We did discuss the option of an arthroplasty if indeed she is an appropriate candidate. I explained that adjacent level arthroplasty beyond a fusion is something that is done but there is no textbook methodology for it. I believe that it is something that is appropriate and is a good option to maintain motion. We will obtain appropriate studies prior to evaluating neck steps. Regarding the lumbar disease unfortunately 8 months postop she is still having what appears to be a left-sided lumbar radiculopathy at L3 or L4. I do not believe that pain in the leg would be originating from the neck at this point. We will reobtain a new MRI lumbar spine to evaluate for any persistent stenosis or recurrent herniation    Followup: No follow-ups on file. Prescriptions Ordered:  No orders of the defined types were placed in this encounter.        Orders Placed:  Orders Placed This Encounter   Procedures    MRI LUMBAR SPINE WO CONTRAST     Standing Status:   Future     Standing Expiration Date:   2/28/2024     Order Specific Question:   Reason for exam:     Answer:   eval stenosis        Electronically signed by Walker Osorio DO on 2/28/2023 at 4:16 PM    Please note that this chart was generated using voice recognition Dragon dictation software. Although every effort was made to ensure the accuracy of this automated transcription, some errors in transcription may have occurred.

## 2023-03-07 ENCOUNTER — HOSPITAL ENCOUNTER (OUTPATIENT)
Facility: CLINIC | Age: 53
Discharge: HOME | End: 2023-03-07
Payer: COMMERCIAL

## 2023-03-07 LAB
25(OH)D3 SERPL-MCNC: 36.5 NG/ML
ALBUMIN SERPL-MCNC: 4.4 G/DL (ref 3.5–5.2)
ALBUMIN/GLOBULIN RATIO: 1.8 (ref 1–2.5)
ALP SERPL-CCNC: 84 U/L (ref 35–104)
ALT SERPL-CCNC: 18 U/L (ref 5–33)
ALT SERPL-CCNC: 3.7 UG/L
ANION GAP SERPL CALCULATED.3IONS-SCNC: 9 MMOL/L (ref 9–17)
AST SERPL-CCNC: 15 U/L
BILIRUB DIRECT SERPL-MCNC: 0.1 MG/DL
BILIRUB INDIRECT SERPL-MCNC: 0.3 MG/DL (ref 0–1)
BILIRUB SERPL-MCNC: 0.4 MG/DL (ref 0.3–1.2)
BUN SERPL-MCNC: 13 MG/DL (ref 6–20)
CALCIUM SERPL-MCNC: 9.3 MG/DL (ref 8.6–10.4)
CHLORIDE SERPL-SCNC: 106 MMOL/L (ref 98–107)
CO2 SERPL-SCNC: 25 MMOL/L (ref 20–31)
CREAT SERPL-MCNC: 0.84 MG/DL (ref 0.5–0.9)
GFR SERPL CREATININE-BSD FRML MDRD: >60 ML/MIN/1.73M2
GLUCOSE SERPL-MCNC: 200 MG/DL (ref 70–99)
HCT VFR BLD AUTO: 40.7 % (ref 36.3–47.1)
HGB BLD-MCNC: 13.4 G/DL (ref 11.9–15.1)
INR PPP: 1
IRON SATURATION: 26 % (ref 20–55)
IRON SERPL-MCNC: 76 UG/DL (ref 37–145)
MCH RBC QN AUTO: 28.5 PG (ref 25.2–33.5)
MCHC RBC AUTO-ENTMCNC: 32.9 G/DL (ref 28.4–34.8)
MCV RBC AUTO: 86.4 FL (ref 82.6–102.9)
NRBC AUTOMATED: 0 PER 100 WBC
PDW BLD-RTO: 13.1 % (ref 11.8–14.4)
PLATELET # BLD AUTO: NORMAL K/UL (ref 138–453)
PLATELET, FLUORESCENCE: NORMAL K/UL (ref 138–453)
POTASSIUM SERPL-SCNC: 4 MMOL/L (ref 3.7–5.3)
PROT SERPL-MCNC: 6.9 G/DL (ref 6.4–8.3)
PROTHROMBIN TIME: 10.2 SEC (ref 9.4–12.6)
RBC # BLD: 4.71 M/UL (ref 3.95–5.11)
SODIUM SERPL-SCNC: 140 MMOL/L (ref 135–144)
TIBC SERPL-MCNC: 288 UG/DL (ref 250–450)
UNSATURATED IRON BINDING CAPACITY: 212 UG/DL (ref 112–347)
WBC # BLD AUTO: 6.4 K/UL (ref 3.5–11.3)

## 2023-03-07 PROCEDURE — 80048 BASIC METABOLIC PNL TOTAL CA: CPT

## 2023-03-07 PROCEDURE — 83550 IRON BINDING TEST: CPT

## 2023-03-07 PROCEDURE — 85027 COMPLETE CBC AUTOMATED: CPT

## 2023-03-07 PROCEDURE — 82306 VITAMIN D 25 HYDROXY: CPT

## 2023-03-07 PROCEDURE — 80076 HEPATIC FUNCTION PANEL: CPT

## 2023-03-07 PROCEDURE — 83540 ASSAY OF IRON: CPT

## 2023-03-07 PROCEDURE — 82105 ALPHA-FETOPROTEIN SERUM: CPT

## 2023-03-07 PROCEDURE — 85055 RETICULATED PLATELET ASSAY: CPT

## 2023-03-07 PROCEDURE — 36415 COLL VENOUS BLD VENIPUNCTURE: CPT

## 2023-03-07 PROCEDURE — 85610 PROTHROMBIN TIME: CPT

## 2023-03-28 ENCOUNTER — HOSPITAL ENCOUNTER (OUTPATIENT)
Dept: MRI IMAGING | Facility: CLINIC | Age: 53
Discharge: HOME | End: 2023-03-30
Payer: COMMERCIAL

## 2023-03-28 DIAGNOSIS — M47.26 OTHER SPONDYLOSIS WITH RADICULOPATHY, LUMBAR REGION: ICD-10-CM

## 2023-03-28 PROCEDURE — 72148 MRI LUMBAR SPINE W/O DYE: CPT

## 2023-03-30 ENCOUNTER — HOSPITAL ENCOUNTER (OUTPATIENT)
Dept: CT IMAGING | Facility: CLINIC | Age: 53
Discharge: HOME | End: 2023-04-01
Payer: COMMERCIAL

## 2023-03-30 DIAGNOSIS — M47.12 CERVICAL SPONDYLOSIS WITH MYELOPATHY: ICD-10-CM

## 2023-03-30 PROCEDURE — 72125 CT NECK SPINE W/O DYE: CPT

## 2023-04-03 ENCOUNTER — OFFICE VISIT (OUTPATIENT)
Dept: NEUROSURGERY | Age: 53
End: 2023-04-03
Payer: COMMERCIAL

## 2023-04-03 VITALS
BODY MASS INDEX: 28.52 KG/M2 | SYSTOLIC BLOOD PRESSURE: 147 MMHG | TEMPERATURE: 97.4 F | HEART RATE: 80 BPM | WEIGHT: 155 LBS | DIASTOLIC BLOOD PRESSURE: 78 MMHG | OXYGEN SATURATION: 94 % | HEIGHT: 62 IN

## 2023-04-03 DIAGNOSIS — M50.30 CERVICAL SPINAL STENOSIS DUE TO ADJACENT SEGMENT DISEASE AFTER FUSION PROCEDURE: ICD-10-CM

## 2023-04-03 DIAGNOSIS — M47.26 OTHER SPONDYLOSIS WITH RADICULOPATHY, LUMBAR REGION: ICD-10-CM

## 2023-04-03 DIAGNOSIS — M48.02 CERVICAL SPINAL STENOSIS DUE TO ADJACENT SEGMENT DISEASE AFTER FUSION PROCEDURE: ICD-10-CM

## 2023-04-03 DIAGNOSIS — G56.01 RIGHT CARPAL TUNNEL SYNDROME: Primary | ICD-10-CM

## 2023-04-03 PROCEDURE — 99214 OFFICE O/P EST MOD 30 MIN: CPT | Performed by: NEUROLOGICAL SURGERY

## 2023-04-03 NOTE — PROGRESS NOTES
°F (36.3 °C) (Temporal)   Ht 5' 2\" (1.575 m)   Wt 155 lb (70.3 kg)   LMP 07/02/2019   SpO2 94%   BMI 28.35 kg/m²   Estimated body mass index is 28.35 kg/m² as calculated from the following:    Height as of this encounter: 5' 2\" (1.575 m). Weight as of this encounter: 155 lb (70.3 kg). General:  Carlos Hollingsworth is a 47y.o. year old female who appears her stated age. HEENT: Normocephalic atraumatic. Neck supple. Chest: regular rate; pulses equal. Equal chest rise and fall  Abdomen: Soft nondistended. Ext: DP equal with good capillary refill  Neuro    Mentation  Appropriate affect   oriented    Cranial Nerves:   Pupils equal and reactive to light  Extraocular motion intact  Face symmetric  No dysarthria  v1-3 sensation symmetric, masseter tone symmetric  Hearing symmetric and intact to finger rub    Sensation:   Abnormal sensation RUE. No ring finger split    Motor  L deltoid 5/5; R deltoid 5/5  L biceps 5/5; R biceps 5/5  L triceps 5/5; R triceps 5/5  L wrist extension 5/5; R wrist extension 5/5  L intrinsics 5/5; R intrinsics 5/5     L iliopsoas 5/5 , R iliopsoas 5/5  L quadriceps 5/5; R quadriceps 5/5  L Dorsiflexion 5/5; R dorsiflexion 5/5  L Plantarflexion 5/5; R plantarflexion 5/5  L EHL 5/5; R EHL 5/5    Reflexes  L Brachioradialis 2+/4; R brachioradialis 2+/4  L Biceps 2+/4; R Biceps 2+/4  L Triceps 2+/4; R Triceps 2+/4  L Patellar 2+/4: R Patellar 2+/4  L Achilles 2+/4; R Achilles 2+/4    hoffmans L: neg  hoffmans R: neg  Clonus L: neg  Clonus R: neg  Babinski L: up  Babinski R; up    Patient did have a positive Phalen's on the right side ulnar and median nerve distribution negative Tinel's over the carpal tunnel. Negative Froment's sign today and Wartenberg no ring finger splint present. Studies Review:     MRI cervical spine does show adjacent disease at C3-4 and C6-7 with broad-based central protrusion and cord distortion.     CT shows osseous fusion from C4-C6 with plate in

## 2023-04-05 ENCOUNTER — TELEPHONE (OUTPATIENT)
Dept: FAMILY MEDICINE CLINIC | Age: 53
End: 2023-04-05

## 2023-04-21 ENCOUNTER — HOSPITAL ENCOUNTER (OUTPATIENT)
Dept: CT IMAGING | Facility: CLINIC | Age: 53
Discharge: HOME | End: 2023-04-21
Payer: COMMERCIAL

## 2023-04-21 DIAGNOSIS — M47.816 LUMBAR SPONDYLOSIS: ICD-10-CM

## 2023-04-21 PROCEDURE — 72131 CT LUMBAR SPINE W/O DYE: CPT

## 2023-04-26 ENCOUNTER — HOSPITAL ENCOUNTER (OUTPATIENT)
Age: 53
Setting detail: SPECIMEN
Discharge: HOME | End: 2023-04-26

## 2023-04-26 ENCOUNTER — OFFICE VISIT (OUTPATIENT)
Dept: FAMILY MEDICINE CLINIC | Age: 53
End: 2023-04-26
Payer: COMMERCIAL

## 2023-04-26 VITALS
OXYGEN SATURATION: 98 % | WEIGHT: 149 LBS | SYSTOLIC BLOOD PRESSURE: 139 MMHG | TEMPERATURE: 97 F | BODY MASS INDEX: 27.25 KG/M2 | DIASTOLIC BLOOD PRESSURE: 85 MMHG | HEART RATE: 85 BPM

## 2023-04-26 DIAGNOSIS — E11.9 TYPE 2 DIABETES MELLITUS WITHOUT COMPLICATION, WITHOUT LONG-TERM CURRENT USE OF INSULIN (HCC): Primary | ICD-10-CM

## 2023-04-26 DIAGNOSIS — Z23 NEED FOR SHINGLES VACCINE: ICD-10-CM

## 2023-04-26 DIAGNOSIS — Z12.31 ENCOUNTER FOR SCREENING MAMMOGRAM FOR MALIGNANT NEOPLASM OF BREAST: ICD-10-CM

## 2023-04-26 DIAGNOSIS — E11.9 TYPE 2 DIABETES MELLITUS WITHOUT COMPLICATION, WITHOUT LONG-TERM CURRENT USE OF INSULIN (HCC): ICD-10-CM

## 2023-04-26 DIAGNOSIS — Z13.1 DIABETES MELLITUS SCREENING: ICD-10-CM

## 2023-04-26 PROBLEM — G54.0 THORACIC OUTLET SYNDROME: Status: ACTIVE | Noted: 2020-04-13

## 2023-04-26 PROBLEM — M48.062 LUMBAR STENOSIS WITH NEUROGENIC CLAUDICATION: Status: ACTIVE | Noted: 2022-06-14

## 2023-04-26 PROBLEM — M51.369 DDD (DEGENERATIVE DISC DISEASE), LUMBAR: Status: ACTIVE | Noted: 2019-08-23

## 2023-04-26 PROBLEM — M51.36 DDD (DEGENERATIVE DISC DISEASE), LUMBAR: Status: ACTIVE | Noted: 2019-08-23

## 2023-04-26 PROBLEM — G47.30 SLEEP APNEA: Status: ACTIVE | Noted: 2020-04-13

## 2023-04-26 PROBLEM — M50.20 HERNIATED CERVICAL DISC: Status: ACTIVE | Noted: 2022-02-28

## 2023-04-26 PROBLEM — K74.60 CIRRHOSIS OF LIVER WITHOUT ASCITES (HCC): Status: ACTIVE | Noted: 2021-02-19

## 2023-04-26 LAB
CREATININE URINE: 230.6 MG/DL (ref 28–217)
HBA1C MFR BLD: 7.3 %
MICROALBUMIN/CREAT 24H UR: 19 MG/L
MICROALBUMIN/CREAT UR-RTO: 8 MCG/MG CREAT

## 2023-04-26 PROCEDURE — 99213 OFFICE O/P EST LOW 20 MIN: CPT | Performed by: FAMILY MEDICINE

## 2023-04-26 PROCEDURE — 3051F HG A1C>EQUAL 7.0%<8.0%: CPT | Performed by: FAMILY MEDICINE

## 2023-04-26 PROCEDURE — 83036 HEMOGLOBIN GLYCOSYLATED A1C: CPT | Performed by: FAMILY MEDICINE

## 2023-04-26 RX ORDER — LISINOPRIL 5 MG/1
2.5 TABLET ORAL DAILY
Qty: 15 TABLET | Refills: 5 | Status: SHIPPED | OUTPATIENT
Start: 2023-04-26

## 2023-04-26 RX ORDER — ZOSTER VACCINE RECOMBINANT, ADJUVANTED 50 MCG/0.5
0.5 KIT INTRAMUSCULAR SEE ADMIN INSTRUCTIONS
Qty: 0.5 ML | Refills: 0 | Status: SHIPPED | OUTPATIENT
Start: 2023-04-26 | End: 2023-10-23

## 2023-04-26 SDOH — ECONOMIC STABILITY: HOUSING INSECURITY
IN THE LAST 12 MONTHS, WAS THERE A TIME WHEN YOU DID NOT HAVE A STEADY PLACE TO SLEEP OR SLEPT IN A SHELTER (INCLUDING NOW)?: NO

## 2023-04-26 SDOH — ECONOMIC STABILITY: FOOD INSECURITY: WITHIN THE PAST 12 MONTHS, THE FOOD YOU BOUGHT JUST DIDN'T LAST AND YOU DIDN'T HAVE MONEY TO GET MORE.: NEVER TRUE

## 2023-04-26 SDOH — ECONOMIC STABILITY: FOOD INSECURITY: WITHIN THE PAST 12 MONTHS, YOU WORRIED THAT YOUR FOOD WOULD RUN OUT BEFORE YOU GOT MONEY TO BUY MORE.: NEVER TRUE

## 2023-04-26 SDOH — ECONOMIC STABILITY: INCOME INSECURITY: HOW HARD IS IT FOR YOU TO PAY FOR THE VERY BASICS LIKE FOOD, HOUSING, MEDICAL CARE, AND HEATING?: NOT HARD AT ALL

## 2023-04-26 ASSESSMENT — PATIENT HEALTH QUESTIONNAIRE - PHQ9
SUM OF ALL RESPONSES TO PHQ QUESTIONS 1-9: 0
SUM OF ALL RESPONSES TO PHQ QUESTIONS 1-9: 0
2. FEELING DOWN, DEPRESSED OR HOPELESS: 0
SUM OF ALL RESPONSES TO PHQ9 QUESTIONS 1 & 2: 0
SUM OF ALL RESPONSES TO PHQ QUESTIONS 1-9: 0
1. LITTLE INTEREST OR PLEASURE IN DOING THINGS: 0
SUM OF ALL RESPONSES TO PHQ QUESTIONS 1-9: 0

## 2023-04-26 NOTE — PROGRESS NOTES
General FM note    Sarah Asencio is a 47 y.o. female who presents today for follow up on her  medical conditions as noted below. Sarah Asencio is c/o of   Chief Complaint   Patient presents with    Other     fmla    Diabetes       Patient Active Problem List:     Fatigue     GERD (gastroesophageal reflux disease)     Obesity (BMI 30-39. 9)     Diabetes mellitus (HCC)     Snoring     Elevated liver enzymes     Unstable angina (HCC)     Positive cardiac stress test     Anomalous coronary artery origin     Trigger thumb of right hand     Acute medial meniscus tear of right knee     Past Medical History:   Diagnosis Date    Anomalous coronary artery origin 2016    Anxiety     Asthma     Back pain     Cirrhosis ([address]     DDD (degenerative disc disease), cervical     DDD (degenerative disc disease), lumbar     Diabetes mellitus ([address]     Fatty liver     History of blood transfusion     Mitral prolapse     Pneumonia     Prolonged emergence from general anesthesia     Thoracic outlet syndrome     Thyroid goiter     had biopsy      Past Surgical History:   Procedure Laterality Date    BACK SURGERY      BRONCHOSCOPY      CARDIAC SURGERY      x 1    CERVICAL SPINE SURGERY      plates placed     SECTION      x 2    CHG US GUIDANCE NEEDLE PLACEMENT IMG S&I      COLONOSCOPY      ENDOSCOPY, COLON, DIAGNOSTIC      EGD    FINGER TRIGGER RELEASE Right 10/09/2019    THUMB TRIGGER RELEASE performed by Lyndsay De Anda MD at [address]      abdomen    KNEE ARTHROSCOPY Right 2022    RIGHT KNEE ARTHROSCOPY PARTIAL MEDIAL MENISCECTOMY performed by Hannah Funes MD at [facility] OR    LIVER BIOPSY      TONSILLECTOMY      TUBAL LIGATION       Family History   Problem Relation Age of Onset    Diabetes Mother     Diabetes Paternal Grandmother      Current Outpatient Medications   Medication Sig Dispense Refill    linaclotide (LINZESS) 145 MCG capsule Take 1 capsule by mouth every morning (before

## 2023-05-02 ENCOUNTER — TELEPHONE (OUTPATIENT)
Dept: FAMILY MEDICINE CLINIC | Age: 53
End: 2023-05-02

## 2023-05-02 DIAGNOSIS — M48.062 PSEUDOCLAUDICATION SYNDROME: ICD-10-CM

## 2023-05-02 DIAGNOSIS — Z98.1 S/P LUMBAR FUSION: ICD-10-CM

## 2023-05-02 DIAGNOSIS — M47.816 LUMBAR SPONDYLOSIS: Primary | ICD-10-CM

## 2023-05-02 NOTE — TELEPHONE ENCOUNTER
----- Message from Enio Gregg sent at 5/1/2023  1:55 PM EDT -----  Subject: Referral Request    Reason for referral request? pt needs referral to Pain Management per   Neurosurgeon; Pain management wants referral from PCP. Info from   Neurosurgeon: Diagnostics S/P cervical spine fusion (Z98.1) Cervical   spondylosis (M47.812) Lumbar spondylosis (M47.816) Spinal stenosis of   lumbar region w/neurogenic claudication (Z78.381) Evaluation for chronic   pain management cervical and lumbar  Provider patient wants to be referred to(if known):     Provider Phone Number(if known):[phone]    Additional Information for Provider?  [facility] Ladarius Arenas MD [address] [place] [id number]   ---------------------------------------------------------------------------  --------------  Leonel Melvin [facility]    [phone]; OK to leave message on voicemail  ---------------------------------------------------------------------------  --------------

## 2023-05-19 DIAGNOSIS — E11.9 TYPE 2 DIABETES MELLITUS WITHOUT COMPLICATION, WITHOUT LONG-TERM CURRENT USE OF INSULIN (HCC): ICD-10-CM

## 2023-05-22 RX ORDER — LISINOPRIL 5 MG/1
TABLET ORAL
Qty: 15 TABLET | Refills: 5 | Status: SHIPPED | OUTPATIENT
Start: 2023-05-22

## 2023-05-28 ENCOUNTER — APPOINTMENT (OUTPATIENT)
Dept: CT IMAGING | Facility: CLINIC | Age: 53
End: 2023-05-28
Payer: COMMERCIAL

## 2023-05-28 ENCOUNTER — HOSPITAL ENCOUNTER (EMERGENCY)
Facility: CLINIC | Age: 53
Discharge: HOME | End: 2023-05-28
Attending: EMERGENCY MEDICINE
Payer: COMMERCIAL

## 2023-05-28 VITALS
OXYGEN SATURATION: 97 % | WEIGHT: 150 LBS | TEMPERATURE: 97.9 F | HEIGHT: 62 IN | DIASTOLIC BLOOD PRESSURE: 84 MMHG | HEART RATE: 94 BPM | SYSTOLIC BLOOD PRESSURE: 142 MMHG | RESPIRATION RATE: 16 BRPM | BODY MASS INDEX: 27.6 KG/M2

## 2023-05-28 DIAGNOSIS — R10.9 FLANK PAIN: Primary | ICD-10-CM

## 2023-05-28 LAB
ALBUMIN SERPL-MCNC: 4.6 G/DL (ref 3.5–5.2)
ALBUMIN/GLOB SERPL: 1.8 {RATIO} (ref 1–2.5)
ALP SERPL-CCNC: 82 U/L (ref 35–104)
ALT SERPL-CCNC: 14 U/L (ref 5–33)
ANION GAP SERPL CALCULATED.3IONS-SCNC: 10 MMOL/L (ref 9–17)
AST SERPL-CCNC: 14 U/L
BASOPHILS # BLD: 0 K/UL (ref 0–0.2)
BASOPHILS NFR BLD: 1 % (ref 0–2)
BILIRUB SERPL-MCNC: 0.4 MG/DL (ref 0.3–1.2)
BUN SERPL-MCNC: 13 MG/DL (ref 6–20)
CALCIUM SERPL-MCNC: 9.6 MG/DL (ref 8.6–10.4)
CHLORIDE SERPL-SCNC: 105 MMOL/L (ref 98–107)
CO2 SERPL-SCNC: 25 MMOL/L (ref 20–31)
CREAT SERPL-MCNC: 0.9 MG/DL (ref 0.5–0.9)
D DIMER PPP FEU-MCNC: 1.37 UG/ML FEU
EOSINOPHIL # BLD: 0.3 K/UL (ref 0–0.4)
EOSINOPHILS RELATIVE PERCENT: 4 % (ref 1–4)
ERYTHROCYTE [DISTWIDTH] IN BLOOD BY AUTOMATED COUNT: 13.4 % (ref 12.5–15.4)
GFR SERPL CREATININE-BSD FRML MDRD: >60 ML/MIN/1.73M2
GLUCOSE SERPL-MCNC: 124 MG/DL (ref 70–99)
HCG SERPL QL: NEGATIVE
HCT VFR BLD AUTO: 38.2 % (ref 36–46)
HGB BLD-MCNC: 13.3 G/DL (ref 12–16)
LYMPHOCYTES # BLD: 23 % (ref 24–44)
LYMPHOCYTES NFR BLD: 2 K/UL (ref 1–4.8)
MCH RBC QN AUTO: 28.6 PG (ref 26–34)
MCHC RBC AUTO-ENTMCNC: 34.8 G/DL (ref 31–37)
MCV RBC AUTO: 82.4 FL (ref 80–100)
MONOCYTES NFR BLD: 0.4 K/UL (ref 0.1–1.2)
MONOCYTES NFR BLD: 5 % (ref 2–11)
NEUTROPHILS NFR BLD: 67 % (ref 36–66)
NEUTS SEG NFR BLD: 6.1 K/UL (ref 1.8–7.7)
PLATELET # BLD AUTO: 152 K/UL (ref 140–450)
PMV BLD AUTO: 8.7 FL (ref 6–12)
POTASSIUM SERPL-SCNC: 3.9 MMOL/L (ref 3.7–5.3)
PROT SERPL-MCNC: 7.1 G/DL (ref 6.4–8.3)
RBC # BLD AUTO: 4.64 M/UL (ref 4–5.2)
SODIUM SERPL-SCNC: 140 MMOL/L (ref 135–144)
WBC OTHER # BLD: 9 K/UL (ref 3.5–11)

## 2023-05-28 PROCEDURE — 96372 THER/PROPH/DIAG INJ SC/IM: CPT

## 2023-05-28 PROCEDURE — 84703 CHORIONIC GONADOTROPIN ASSAY: CPT

## 2023-05-28 PROCEDURE — 6360000002 HC RX W HCPCS: Performed by: EMERGENCY MEDICINE

## 2023-05-28 PROCEDURE — 36415 COLL VENOUS BLD VENIPUNCTURE: CPT

## 2023-05-28 PROCEDURE — 2580000003 HC RX 258: Performed by: EMERGENCY MEDICINE

## 2023-05-28 PROCEDURE — 71260 CT THORAX DX C+: CPT

## 2023-05-28 PROCEDURE — 6360000004 HC RX CONTRAST MEDICATION: Performed by: EMERGENCY MEDICINE

## 2023-05-28 PROCEDURE — 85379 FIBRIN DEGRADATION QUANT: CPT

## 2023-05-28 PROCEDURE — 99285 EMERGENCY DEPT VISIT HI MDM: CPT

## 2023-05-28 PROCEDURE — 80053 COMPREHEN METABOLIC PANEL: CPT

## 2023-05-28 PROCEDURE — 85025 COMPLETE CBC W/AUTO DIFF WBC: CPT

## 2023-05-28 PROCEDURE — 74177 CT ABD & PELVIS W/CONTRAST: CPT

## 2023-05-28 RX ORDER — NAPROXEN 500 MG/1
500 TABLET ORAL 2 TIMES DAILY WITH MEALS
Qty: 20 TABLET | Refills: 1 | Status: SHIPPED | OUTPATIENT
Start: 2023-05-28

## 2023-05-28 RX ORDER — 0.9 % SODIUM CHLORIDE 0.9 %
70 INTRAVENOUS SOLUTION INTRAVENOUS ONCE
Status: COMPLETED | OUTPATIENT
Start: 2023-05-28 | End: 2023-05-28

## 2023-05-28 RX ORDER — SEMAGLUTIDE 1.34 MG/ML
INJECTION, SOLUTION SUBCUTANEOUS
COMMUNITY

## 2023-05-28 RX ORDER — SODIUM CHLORIDE 0.9 % (FLUSH) 0.9 %
10 SYRINGE (ML) INJECTION PRN
Status: DISCONTINUED | OUTPATIENT
Start: 2023-05-28 | End: 2023-05-28 | Stop reason: HOSPADM

## 2023-05-28 RX ORDER — ORPHENADRINE CITRATE 30 MG/ML
60 INJECTION INTRAMUSCULAR; INTRAVENOUS ONCE
Status: COMPLETED | OUTPATIENT
Start: 2023-05-28 | End: 2023-05-28

## 2023-05-28 RX ORDER — DEXAMETHASONE SODIUM PHOSPHATE 4 MG/ML
4 INJECTION, SOLUTION INTRA-ARTICULAR; INTRALESIONAL; INTRAMUSCULAR; INTRAVENOUS; SOFT TISSUE ONCE
Status: COMPLETED | OUTPATIENT
Start: 2023-05-28 | End: 2023-05-28

## 2023-05-28 RX ORDER — HYDROCODONE BITARTRATE AND ACETAMINOPHEN 5; 325 MG/1; MG/1
1 TABLET ORAL EVERY 6 HOURS PRN
Qty: 10 TABLET | Refills: 0 | Status: SHIPPED | OUTPATIENT
Start: 2023-05-28 | End: 2023-05-31

## 2023-05-28 RX ORDER — KETOROLAC TROMETHAMINE 30 MG/ML
30 INJECTION, SOLUTION INTRAMUSCULAR; INTRAVENOUS ONCE
Status: DISCONTINUED | OUTPATIENT
Start: 2023-05-28 | End: 2023-05-28 | Stop reason: HOSPADM

## 2023-05-28 RX ORDER — CYCLOBENZAPRINE HCL 10 MG
10 TABLET ORAL 3 TIMES DAILY PRN
Qty: 21 TABLET | Refills: 0 | Status: SHIPPED | OUTPATIENT
Start: 2023-05-28 | End: 2023-06-07

## 2023-05-28 RX ADMIN — IOPAMIDOL 80 ML: 755 INJECTION, SOLUTION INTRAVENOUS at 12:45

## 2023-05-28 RX ADMIN — SODIUM CHLORIDE 70 ML: 9 INJECTION, SOLUTION INTRAVENOUS at 12:47

## 2023-05-28 RX ADMIN — ORPHENADRINE CITRATE 60 MG: 60 INJECTION INTRAMUSCULAR; INTRAVENOUS at 14:22

## 2023-05-28 RX ADMIN — SODIUM CHLORIDE, PRESERVATIVE FREE 10 ML: 5 INJECTION INTRAVENOUS at 12:47

## 2023-05-28 RX ADMIN — DEXAMETHASONE SODIUM PHOSPHATE 4 MG: 4 INJECTION, SOLUTION INTRAMUSCULAR; INTRAVENOUS at 14:21

## 2023-05-28 ASSESSMENT — PAIN SCALES - GENERAL
PAINLEVEL_OUTOF10: 6
PAINLEVEL_OUTOF10: 4

## 2023-05-28 ASSESSMENT — PAIN - FUNCTIONAL ASSESSMENT: PAIN_FUNCTIONAL_ASSESSMENT: 0-10

## 2023-05-28 ASSESSMENT — PAIN DESCRIPTION - ORIENTATION: ORIENTATION: RIGHT

## 2023-05-28 ASSESSMENT — PAIN DESCRIPTION - DESCRIPTORS: DESCRIPTORS: ACHING

## 2023-05-30 RX ORDER — SODIUM CHLORIDE, SODIUM LACTATE, POTASSIUM CHLORIDE, CALCIUM CHLORIDE 600; 310; 30; 20 MG/100ML; MG/100ML; MG/100ML; MG/100ML
1000 INJECTION, SOLUTION INTRAVENOUS CONTINUOUS
Status: CANCELLED | OUTPATIENT
Start: 2023-05-30

## 2023-05-31 ENCOUNTER — HOSPITAL ENCOUNTER (OUTPATIENT)
Dept: PREADMISSION TESTING | Age: 53
Discharge: HOME | End: 2023-05-31

## 2023-07-18 ENCOUNTER — HOSPITAL ENCOUNTER (OUTPATIENT)
Facility: CLINIC | Age: 53
Discharge: HOME | End: 2023-07-18
Payer: COMMERCIAL

## 2023-07-18 LAB
AFP SERPL-MCNC: 3.2 UG/L
ALBUMIN SERPL-MCNC: 4.6 G/DL (ref 3.5–5.2)
ALBUMIN/GLOB SERPL: 1.8 {RATIO} (ref 1–2.5)
ALP SERPL-CCNC: 73 U/L (ref 35–104)
ALT SERPL-CCNC: 13 U/L (ref 5–33)
ANION GAP SERPL CALCULATED.3IONS-SCNC: 13 MMOL/L (ref 9–17)
AST SERPL-CCNC: 16 U/L
BILIRUB DIRECT SERPL-MCNC: 0.1 MG/DL
BILIRUB INDIRECT SERPL-MCNC: 0.4 MG/DL (ref 0–1)
BILIRUB SERPL-MCNC: 0.5 MG/DL (ref 0.3–1.2)
BUN SERPL-MCNC: 11 MG/DL (ref 6–20)
CALCIUM SERPL-MCNC: 9.3 MG/DL (ref 8.6–10.4)
CHLORIDE SERPL-SCNC: 105 MMOL/L (ref 98–107)
CO2 SERPL-SCNC: 25 MMOL/L (ref 20–31)
CREAT SERPL-MCNC: 0.8 MG/DL (ref 0.5–0.9)
ERYTHROCYTE [DISTWIDTH] IN BLOOD BY AUTOMATED COUNT: 13.2 % (ref 11.8–14.4)
GFR SERPL CREATININE-BSD FRML MDRD: >60 ML/MIN/1.73M2
GLUCOSE SERPL-MCNC: 101 MG/DL (ref 70–99)
HCT VFR BLD AUTO: 38.9 % (ref 36.3–47.1)
HGB BLD-MCNC: 12.8 G/DL (ref 11.9–15.1)
INR PPP: 1
MCH RBC QN AUTO: 27.9 PG (ref 25.2–33.5)
MCHC RBC AUTO-ENTMCNC: 32.9 G/DL (ref 28.4–34.8)
MCV RBC AUTO: 84.9 FL (ref 82.6–102.9)
NRBC BLD-RTO: 0 PER 100 WBC
PLATELET # BLD AUTO: 141 K/UL (ref 138–453)
PMV BLD AUTO: 11.7 FL (ref 8.1–13.5)
POTASSIUM SERPL-SCNC: 3.9 MMOL/L (ref 3.7–5.3)
PROT SERPL-MCNC: 7.2 G/DL (ref 6.4–8.3)
PROTHROMBIN TIME: 10 SEC (ref 9.4–12.6)
RBC # BLD AUTO: 4.58 M/UL (ref 3.95–5.11)
SODIUM SERPL-SCNC: 143 MMOL/L (ref 135–144)
WBC OTHER # BLD: 6.7 K/UL (ref 3.5–11.3)

## 2023-07-18 PROCEDURE — 82105 ALPHA-FETOPROTEIN SERUM: CPT

## 2023-07-18 PROCEDURE — 80048 BASIC METABOLIC PNL TOTAL CA: CPT

## 2023-07-18 PROCEDURE — 36415 COLL VENOUS BLD VENIPUNCTURE: CPT

## 2023-07-18 PROCEDURE — 85610 PROTHROMBIN TIME: CPT

## 2023-07-18 PROCEDURE — 80076 HEPATIC FUNCTION PANEL: CPT

## 2023-07-18 PROCEDURE — 85027 COMPLETE CBC AUTOMATED: CPT

## 2023-07-21 ENCOUNTER — TELEPHONE (OUTPATIENT)
Dept: FAMILY MEDICINE CLINIC | Age: 53
End: 2023-07-21

## 2023-07-21 NOTE — TELEPHONE ENCOUNTER
Patient called in stating she has been having the following sx since yesterday she feels she has a sinus infection she is wanting to know if you can call her in something to help     Sx left earache, runny nose, sore throat    No fever     Duration yesterday     Medication tried motrin       [facility], [address] [phone]      Please advise

## 2023-07-31 ENCOUNTER — HOSPITAL ENCOUNTER (OUTPATIENT)
Dept: ULTRASOUND IMAGING | Facility: CLINIC | Age: 53
Discharge: HOME | End: 2023-08-02
Attending: NURSE PRACTITIONER
Payer: COMMERCIAL

## 2023-07-31 ENCOUNTER — HOSPITAL ENCOUNTER (OUTPATIENT)
Facility: CLINIC | Age: 53
Discharge: HOME | End: 2023-07-31
Payer: COMMERCIAL

## 2023-07-31 DIAGNOSIS — K76.9 DISEASE OF LIVER: ICD-10-CM

## 2023-07-31 DIAGNOSIS — K74.60 CIRRHOSIS OF LIVER WITHOUT ASCITES, UNSPECIFIED HEPATIC CIRRHOSIS TYPE (HCC): ICD-10-CM

## 2023-07-31 LAB
ALBUMIN SERPL-MCNC: 4.6 G/DL (ref 3.5–5.2)
ALBUMIN/GLOB SERPL: 1.6 {RATIO} (ref 1–2.5)
ALP SERPL-CCNC: 77 U/L (ref 35–104)
ALT SERPL-CCNC: 17 U/L (ref 5–33)
ANION GAP SERPL CALCULATED.3IONS-SCNC: 17 MMOL/L (ref 9–17)
AST SERPL-CCNC: 17 U/L
BILIRUB SERPL-MCNC: 0.5 MG/DL (ref 0.3–1.2)
BUN SERPL-MCNC: 11 MG/DL (ref 6–20)
CALCIUM SERPL-MCNC: 9.7 MG/DL (ref 8.6–10.4)
CHLORIDE SERPL-SCNC: 105 MMOL/L (ref 98–107)
CHOLEST SERPL-MCNC: 166 MG/DL
CHOLESTEROL/HDL RATIO: 3.4
CO2 SERPL-SCNC: 21 MMOL/L (ref 20–31)
CREAT SERPL-MCNC: 0.8 MG/DL (ref 0.5–0.9)
CREAT UR-MCNC: 334.9 MG/DL (ref 28–217)
EST. AVERAGE GLUCOSE BLD GHB EST-MCNC: 111 MG/DL
GFR SERPL CREATININE-BSD FRML MDRD: >60 ML/MIN/1.73M2
GLUCOSE SERPL-MCNC: 135 MG/DL (ref 70–99)
HBA1C MFR BLD: 5.5 % (ref 4–6)
HDLC SERPL-MCNC: 49 MG/DL
LDLC SERPL CALC-MCNC: 95 MG/DL (ref 0–130)
MICROALBUMIN UR-MCNC: 71 MG/L
MICROALBUMIN/CREAT UR-RTO: 21 MCG/MG CREAT
POTASSIUM SERPL-SCNC: 4.1 MMOL/L (ref 3.7–5.3)
PROT SERPL-MCNC: 7.4 G/DL (ref 6.4–8.3)
SODIUM SERPL-SCNC: 143 MMOL/L (ref 135–144)
TRIGL SERPL-MCNC: 111 MG/DL

## 2023-07-31 PROCEDURE — 76705 ECHO EXAM OF ABDOMEN: CPT

## 2023-07-31 PROCEDURE — 36415 COLL VENOUS BLD VENIPUNCTURE: CPT

## 2023-07-31 PROCEDURE — 82570 ASSAY OF URINE CREATININE: CPT

## 2023-07-31 PROCEDURE — 83835 ASSAY OF METANEPHRINES: CPT

## 2023-07-31 PROCEDURE — 83036 HEMOGLOBIN GLYCOSYLATED A1C: CPT

## 2023-07-31 PROCEDURE — 82043 UR ALBUMIN QUANTITATIVE: CPT

## 2023-07-31 PROCEDURE — 80053 COMPREHEN METABOLIC PANEL: CPT

## 2023-07-31 PROCEDURE — 80061 LIPID PANEL: CPT

## 2023-08-03 LAB
METANEPH/PLASMA INTERP: ABNORMAL
METANEPHRINE: 0.18 NMOL/L (ref 0–0.49)
NORMETANEPHRINE PLASMA: 1.05 NMOL/L (ref 0–0.89)

## 2023-08-14 ENCOUNTER — HOSPITAL ENCOUNTER (OUTPATIENT)
Facility: CLINIC | Age: 53
Discharge: HOME | End: 2023-08-14

## 2023-08-17 ENCOUNTER — HOSPITAL ENCOUNTER (OUTPATIENT)
Facility: CLINIC | Age: 53
Setting detail: SPECIMEN
Discharge: HOME | End: 2023-08-17
Payer: COMMERCIAL

## 2023-08-17 PROCEDURE — 83520 IMMUNOASSAY QUANT NOS NONAB: CPT

## 2023-08-17 PROCEDURE — 87338 HPYLORI STOOL AG IA: CPT

## 2023-08-17 PROCEDURE — 87449 NOS EACH ORGANISM AG IA: CPT

## 2023-08-17 PROCEDURE — 87324 CLOSTRIDIUM AG IA: CPT

## 2023-08-17 PROCEDURE — 87506 IADNA-DNA/RNA PROBE TQ 6-11: CPT

## 2023-08-17 PROCEDURE — 83993 ASSAY FOR CALPROTECTIN FECAL: CPT

## 2023-08-18 LAB
C DIFF GDH + TOXINS A+B STL QL IA.RAPID: NEGATIVE
CAMPYLOBACTER DNA SPEC NAA+PROBE: NORMAL
ETEC ELTA+ESTB GENES STL QL NAA+PROBE: NORMAL
MICROORGANISM/AGENT SPEC: NEGATIVE
P SHIGELLOIDES DNA STL QL NAA+PROBE: NORMAL
SALMONELLA DNA SPEC QL NAA+PROBE: NORMAL
SHIGA TOXIN STX GENE SPEC NAA+PROBE: NORMAL
SHIGELLA DNA SPEC QL NAA+PROBE: NORMAL
SPECIMEN DESCRIPTION: NORMAL
V CHOL+PARA RFBL+TRKH+TNAA STL QL NAA+PR: NORMAL
Y ENTERO RECN STL QL NAA+PROBE: NORMAL

## 2023-08-20 LAB — FECAL PANCREATIC ELASTASE-1: 710 UG/G

## 2023-08-21 LAB — CALPROTECTIN, FECAL: 21 UG/G

## 2023-09-29 ENCOUNTER — TELEPHONE (OUTPATIENT)
Dept: FAMILY MEDICINE CLINIC | Age: 53
End: 2023-09-29

## 2023-09-29 NOTE — TELEPHONE ENCOUNTER
Patient is having issues with nasal drainage, pressure in face, cough with yellow mucous x 1 wk, Patient states that she was prescribed tessalon pearls but no antibiotic and would like to know if provider can sent antibiotic to  pharmacy. Patient lov 7/21/23

## 2023-10-02 RX ORDER — AMOXICILLIN 500 MG/1
500 CAPSULE ORAL 2 TIMES DAILY
Qty: 14 CAPSULE | Refills: 0 | Status: SHIPPED | OUTPATIENT
Start: 2023-10-02 | End: 2023-10-09

## 2023-10-02 NOTE — TELEPHONE ENCOUNTER
Antibiotic was called in. Please make sure to also use over-the-counter nasal spray allergy medication. Thank you.   The

## 2023-11-01 ENCOUNTER — HOSPITAL ENCOUNTER (OUTPATIENT)
Facility: CLINIC | Age: 53
Discharge: HOME | End: 2023-11-01
Payer: COMMERCIAL

## 2023-11-01 LAB
C PEPTIDE SERPL-MCNC: 4.3 NG/ML (ref 1.1–4.4)
EST. AVERAGE GLUCOSE BLD GHB EST-MCNC: 111 MG/DL
HBA1C MFR BLD: 5.5 % (ref 4–6)
INSULIN COMMENT: NORMAL
INSULIN REFERENCE RANGE:: NORMAL
INSULIN: 26.4 MU/L
T3FREE SERPL-MCNC: 2.9 PG/ML (ref 2.02–4.43)
T4 FREE SERPL-MCNC: 1 NG/DL (ref 0.9–1.7)
TSH SERPL DL<=0.05 MIU/L-ACNC: 1.31 UIU/ML (ref 0.3–5)

## 2023-11-01 PROCEDURE — 84439 ASSAY OF FREE THYROXINE: CPT

## 2023-11-01 PROCEDURE — 36415 COLL VENOUS BLD VENIPUNCTURE: CPT

## 2023-11-01 PROCEDURE — 86341 ISLET CELL ANTIBODY: CPT

## 2023-11-01 PROCEDURE — 84481 FREE ASSAY (FT-3): CPT

## 2023-11-01 PROCEDURE — 83525 ASSAY OF INSULIN: CPT

## 2023-11-01 PROCEDURE — 84443 ASSAY THYROID STIM HORMONE: CPT

## 2023-11-01 PROCEDURE — 84681 ASSAY OF C-PEPTIDE: CPT

## 2023-11-01 PROCEDURE — 83036 HEMOGLOBIN GLYCOSYLATED A1C: CPT

## 2023-11-03 LAB — GLUTAMIC ACID DECARB AB: <5 IU/ML (ref 0–5)

## 2023-11-04 LAB — PANC ISLET CELL AB TITR SER: NORMAL {TITER}

## 2023-11-29 ENCOUNTER — TELEPHONE (OUTPATIENT)
Dept: FAMILY MEDICINE CLINIC | Age: 53
End: 2023-11-29

## 2023-11-29 NOTE — TELEPHONE ENCOUNTER
Patient called 3 days ago, no message seen. Patient did try to get appointment, was told nothing available. Patient has a sinus infection for three weeks now. Green and heavy mucus, sinus drainage and cough from the drainage into the throat. She has been taking Allegra OTC. Can she have antibiotic and Flonase.     Allergies and Pharmacy verified     [id number] - [address], [address] [facility] [[id number]]

## 2023-11-30 ENCOUNTER — TELEPHONE (OUTPATIENT)
Dept: ORTHOPEDIC SURGERY | Age: 53
End: 2023-11-30

## 2023-11-30 RX ORDER — FLUTICASONE PROPIONATE 50 MCG
1 SPRAY, SUSPENSION (ML) NASAL DAILY
Qty: 32 G | Refills: 1 | Status: SHIPPED | OUTPATIENT
Start: 2023-11-30

## 2023-11-30 RX ORDER — AMOXICILLIN AND CLAVULANATE POTASSIUM 875; 125 MG/1; MG/1
1 TABLET, FILM COATED ORAL 2 TIMES DAILY
Qty: 14 TABLET | Refills: 0 | Status: SHIPPED | OUTPATIENT
Start: 2023-11-30 | End: 2023-12-07

## 2023-11-30 NOTE — TELEPHONE ENCOUNTER
Flonase called in as well as Augmentin. Not sure if the patient is able to take the antibiotic due to allergies. Thank you.

## 2023-11-30 NOTE — TELEPHONE ENCOUNTER
Patient called to see if she can be worked into schedule with Jocelyne Gowers this week as she us unable to walk due to R knee pain.

## 2023-12-05 NOTE — TELEPHONE ENCOUNTER
Suggested pt see PCP for her knee as Erika Jordan doesn't have any available appts this week, or she can call us back and see Erika Jordan when he has open appt slots in the next few weeks.

## 2023-12-11 ENCOUNTER — OFFICE VISIT (OUTPATIENT)
Dept: ORTHOPEDIC SURGERY | Age: 53
End: 2023-12-11
Payer: COMMERCIAL

## 2023-12-11 VITALS — BODY MASS INDEX: 25.76 KG/M2 | HEIGHT: 62 IN | RESPIRATION RATE: 15 BRPM | WEIGHT: 140 LBS

## 2023-12-11 DIAGNOSIS — G89.29 CHRONIC PAIN OF RIGHT KNEE: Primary | ICD-10-CM

## 2023-12-11 DIAGNOSIS — M25.561 CHRONIC PAIN OF RIGHT KNEE: Primary | ICD-10-CM

## 2023-12-11 PROCEDURE — 20610 DRAIN/INJ JOINT/BURSA W/O US: CPT | Performed by: PHYSICIAN ASSISTANT

## 2023-12-11 PROCEDURE — 99213 OFFICE O/P EST LOW 20 MIN: CPT | Performed by: PHYSICIAN ASSISTANT

## 2023-12-11 RX ORDER — BETAMETHASONE SODIUM PHOSPHATE AND BETAMETHASONE ACETATE 3; 3 MG/ML; MG/ML
12 INJECTION, SUSPENSION INTRA-ARTICULAR; INTRALESIONAL; INTRAMUSCULAR; SOFT TISSUE ONCE
Status: COMPLETED | OUTPATIENT
Start: 2023-12-11 | End: 2023-12-11

## 2023-12-11 RX ORDER — BUPIVACAINE HYDROCHLORIDE 5 MG/ML
2 INJECTION, SOLUTION PERINEURAL ONCE
Status: COMPLETED | OUTPATIENT
Start: 2023-12-11 | End: 2023-12-11

## 2023-12-11 RX ORDER — LIDOCAINE HYDROCHLORIDE 10 MG/ML
2 INJECTION, SOLUTION INFILTRATION; PERINEURAL ONCE
Status: COMPLETED | OUTPATIENT
Start: 2023-12-11 | End: 2023-12-11

## 2023-12-11 RX ADMIN — BETAMETHASONE SODIUM PHOSPHATE AND BETAMETHASONE ACETATE 12 MG: 3; 3 INJECTION, SUSPENSION INTRA-ARTICULAR; INTRALESIONAL; INTRAMUSCULAR; SOFT TISSUE at 11:29

## 2023-12-11 RX ADMIN — LIDOCAINE HYDROCHLORIDE 2 ML: 10 INJECTION, SOLUTION INFILTRATION; PERINEURAL at 11:31

## 2023-12-11 RX ADMIN — BUPIVACAINE HYDROCHLORIDE 10 MG: 5 INJECTION, SOLUTION PERINEURAL at 11:30

## 2023-12-12 NOTE — PROGRESS NOTES
requesting repeat corticosteroid injection this is done as outlined below  2. Celestone injection given and tolerated well  3. Follow-up in 4 weeks however patient may call it in sooner for any questions or concerns  4. I do believe a lot of her discomfort in this right leg is related to her lumbar radiculopathy which she is standing pain management for would advise continuing with this as she is anticipating undergoing a second round of epidural steroid injections in the near future    Procedure Note: right Knee Celestone Injection   An informed verbal consent for the procedure was obtained and risks including, but not limited to: allergy to medications, injection, bleeding, stiffness of joint, recurrence of symptoms, loss of function, swelling, drainage, irrigation, need for surgery and pseudo-septic inflammation, were explained to the patient. Also, discussed was the potential for further injections, irrigation and debridement and surgery. Alternate means of treatment have also been discussed with the patient. Following an appropriate discussion with the patient regarding the risks and benefits of the procedure she consented to proceed. her right knee was prepped using betadine solution and alcohol swab. Using aseptic technique and through a lateral joint line approach, which was then injected superficially with 4 cc of 1% lidocaine without epinephrine and subsequently with 2 cc of 6 mg/mL Celestone into the right knee. A band aid was applied to the injection site. she tolerated the injection with no immediate adverse reactions. Electronically signed by PRISCA Lantigua on 12/12/23 at 7:48 AM EST        Please note that this chart was generated using voice recognition Dragon dictation software. Although every effort was made to ensure the accuracy of this automated transcription, some errors in transcription may have occurred.

## 2023-12-14 ENCOUNTER — HOSPITAL ENCOUNTER (OUTPATIENT)
Facility: CLINIC | Age: 53
Discharge: HOME | End: 2023-12-14

## 2024-01-10 ENCOUNTER — HOSPITAL ENCOUNTER (OUTPATIENT)
Dept: CT IMAGING | Facility: CLINIC | Age: 54
Discharge: HOME | End: 2024-01-12
Attending: INTERNAL MEDICINE
Payer: COMMERCIAL

## 2024-01-10 ENCOUNTER — HOSPITAL ENCOUNTER (OUTPATIENT)
Facility: CLINIC | Age: 54
Discharge: HOME | End: 2024-01-10
Payer: COMMERCIAL

## 2024-01-10 DIAGNOSIS — J32.9 SINUSITIS, UNSPECIFIED CHRONICITY, UNSPECIFIED LOCATION: ICD-10-CM

## 2024-01-10 DIAGNOSIS — J45.909 ASTHMA, UNSPECIFIED ASTHMA SEVERITY, UNSPECIFIED WHETHER COMPLICATED, UNSPECIFIED WHETHER PERSISTENT: ICD-10-CM

## 2024-01-10 DIAGNOSIS — D44.10 MASS OF UNCERTAIN BEHAVIOR OF ADRENAL GLAND: ICD-10-CM

## 2024-01-10 LAB
ALBUMIN SERPL-MCNC: 4.5 G/DL (ref 3.5–5.2)
ALBUMIN/GLOB SERPL: 2 {RATIO} (ref 1–2.5)
ALP SERPL-CCNC: 78 U/L (ref 35–104)
ALT SERPL-CCNC: 15 U/L (ref 10–35)
ANION GAP SERPL CALCULATED.3IONS-SCNC: 8 MMOL/L (ref 9–17)
AST SERPL-CCNC: 18 U/L (ref 10–35)
BASOPHILS # BLD: 0.05 K/UL (ref 0–0.2)
BASOPHILS NFR BLD: 1 % (ref 0–2)
BILIRUB DIRECT SERPL-MCNC: <0.2 MG/DL (ref 0–0.3)
BILIRUB INDIRECT SERPL-MCNC: 0.3 MG/DL (ref 0–1)
BILIRUB SERPL-MCNC: 0.4 MG/DL (ref 0–1.2)
BUN SERPL-MCNC: 12 MG/DL (ref 6–20)
CALCIUM SERPL-MCNC: 9.7 MG/DL (ref 8.6–10.4)
CHLORIDE SERPL-SCNC: 105 MMOL/L (ref 98–107)
CO2 SERPL-SCNC: 28 MMOL/L (ref 20–31)
CREAT SERPL-MCNC: 1 MG/DL (ref 0.5–0.9)
EOSINOPHIL # BLD: 0.31 K/UL (ref 0–0.44)
EOSINOPHILS RELATIVE PERCENT: 5 % (ref 1–4)
ERYTHROCYTE [DISTWIDTH] IN BLOOD BY AUTOMATED COUNT: 13.3 % (ref 11.8–14.4)
GFR SERPL CREATININE-BSD FRML MDRD: >60 ML/MIN/1.73M2
GLOBULIN SER CALC-MCNC: 2.5 G/DL
GLUCOSE SERPL-MCNC: 128 MG/DL (ref 70–99)
HCT VFR BLD AUTO: 44 % (ref 36.3–47.1)
HGB BLD-MCNC: 14.4 G/DL (ref 11.9–15.1)
IMM GRANULOCYTES # BLD AUTO: <0.03 K/UL (ref 0–0.3)
IMM GRANULOCYTES NFR BLD: 0 %
INR PPP: 0.9
LYMPHOCYTES NFR BLD: 2.05 K/UL (ref 1.1–3.7)
LYMPHOCYTES RELATIVE PERCENT: 30 % (ref 24–43)
MCH RBC QN AUTO: 28.2 PG (ref 25.2–33.5)
MCHC RBC AUTO-ENTMCNC: 32.7 G/DL (ref 28.4–34.8)
MCV RBC AUTO: 86.3 FL (ref 82.6–102.9)
MONOCYTES NFR BLD: 0.4 K/UL (ref 0.1–1.2)
MONOCYTES NFR BLD: 6 % (ref 3–12)
NEUTROPHILS NFR BLD: 58 % (ref 36–65)
NEUTS SEG NFR BLD: 4.1 K/UL (ref 1.5–8.1)
NRBC BLD-RTO: 0 PER 100 WBC
PLATELET # BLD AUTO: 151 K/UL (ref 138–453)
PMV BLD AUTO: 11.2 FL (ref 8.1–13.5)
POTASSIUM SERPL-SCNC: 4 MMOL/L (ref 3.7–5.3)
PROT SERPL-MCNC: 7 G/DL (ref 6.6–8.7)
PROTHROMBIN TIME: 9.7 SEC (ref 9.4–12.6)
RBC # BLD AUTO: 5.1 M/UL (ref 3.95–5.11)
SODIUM SERPL-SCNC: 141 MMOL/L (ref 135–144)
WBC OTHER # BLD: 6.9 K/UL (ref 3.5–11.3)

## 2024-01-10 PROCEDURE — 80076 HEPATIC FUNCTION PANEL: CPT

## 2024-01-10 PROCEDURE — 36415 COLL VENOUS BLD VENIPUNCTURE: CPT

## 2024-01-10 PROCEDURE — 85610 PROTHROMBIN TIME: CPT

## 2024-01-10 PROCEDURE — 70486 CT MAXILLOFACIAL W/O DYE: CPT

## 2024-01-10 PROCEDURE — 74150 CT ABDOMEN W/O CONTRAST: CPT

## 2024-01-10 PROCEDURE — 82105 ALPHA-FETOPROTEIN SERUM: CPT

## 2024-01-10 PROCEDURE — 80048 BASIC METABOLIC PNL TOTAL CA: CPT

## 2024-01-10 PROCEDURE — 85025 COMPLETE CBC W/AUTO DIFF WBC: CPT

## 2024-01-25 LAB — AFP SERPL-MCNC: 3.2 UG/L

## 2024-02-26 ENCOUNTER — HOSPITAL ENCOUNTER (OUTPATIENT)
Dept: GENERAL RADIOLOGY | Facility: CLINIC | Age: 54
Discharge: HOME | End: 2024-02-28
Payer: COMMERCIAL

## 2024-02-26 ENCOUNTER — HOSPITAL ENCOUNTER (OUTPATIENT)
Facility: CLINIC | Age: 54
Discharge: HOME | End: 2024-02-26
Payer: COMMERCIAL

## 2024-02-26 ENCOUNTER — HOSPITAL ENCOUNTER (OUTPATIENT)
Facility: CLINIC | Age: 54
Discharge: HOME | End: 2024-02-28
Payer: COMMERCIAL

## 2024-02-26 DIAGNOSIS — M25.551 RIGHT HIP PAIN: ICD-10-CM

## 2024-02-26 LAB
ALBUMIN SERPL-MCNC: 4.3 G/DL (ref 3.5–5.2)
ALBUMIN/GLOB SERPL: 1.9 {RATIO} (ref 1–2.5)
ALP SERPL-CCNC: 76 U/L (ref 35–104)
ALT SERPL-CCNC: 15 U/L (ref 5–33)
AST SERPL-CCNC: 13 U/L
BILIRUB DIRECT SERPL-MCNC: 0.1 MG/DL
BILIRUB INDIRECT SERPL-MCNC: 0.2 MG/DL (ref 0–1)
BILIRUB SERPL-MCNC: 0.3 MG/DL (ref 0.3–1.2)
PROT SERPL-MCNC: 6.6 G/DL (ref 6.4–8.3)

## 2024-02-26 PROCEDURE — 73502 X-RAY EXAM HIP UNI 2-3 VIEWS: CPT

## 2024-02-26 PROCEDURE — 84460 ALANINE AMINO (ALT) (SGPT): CPT

## 2024-02-26 PROCEDURE — 84450 TRANSFERASE (AST) (SGOT): CPT

## 2024-02-26 PROCEDURE — 80076 HEPATIC FUNCTION PANEL: CPT

## 2024-02-26 PROCEDURE — 82977 ASSAY OF GGT: CPT

## 2024-02-26 PROCEDURE — 36415 COLL VENOUS BLD VENIPUNCTURE: CPT

## 2024-02-26 PROCEDURE — 84520 ASSAY OF UREA NITROGEN: CPT

## 2024-02-26 PROCEDURE — 83883 ASSAY NEPHELOMETRY NOT SPEC: CPT

## 2024-03-01 LAB
ALANINE AMINOTRANSFERASE, FIBROMETER: 10 U/L (ref 5–40)
ALPHA-2-MACROGLOBULIN, FIBROMETER: 280 MG/DL (ref 131–293)
ASPARTATE AMINOTRANSFERASE, FIBROMETER: 17 U/L (ref 9–40)
CIRRHOMETER PATIENT SCORE: 0.06
EER FIBROMETER REPORT: NORMAL
FIBROMETER INTERPRETATION: NORMAL
FIBROMETER PATIENT SCORE: 0.45
FIBROMETER PLATELET COUNT: 167
FIBROMETER PROTHROMBIN INDEX: 90 % (ref 90–120)
FIBROSIS METAVIR CLASSIFICATION: NORMAL
GAMMA GLUTAMYL TRANSFERASE, FIBROMETER: 12 U/L (ref 7–33)
INFLAMETER METAVIR CLASSIFICATION: NORMAL
INFLAMETER PATIENT SCORE: 0.41
UREA NITROGEN, FIBROMETER: 15 MG/DL (ref 7–20)

## 2024-03-05 ENCOUNTER — OFFICE VISIT (OUTPATIENT)
Dept: ORTHOPEDIC SURGERY | Age: 54
End: 2024-03-05
Payer: COMMERCIAL

## 2024-03-05 VITALS — RESPIRATION RATE: 14 BRPM | HEIGHT: 62 IN | BODY MASS INDEX: 25.76 KG/M2 | WEIGHT: 140 LBS

## 2024-03-05 DIAGNOSIS — M25.561 CHRONIC PAIN OF RIGHT KNEE: Primary | ICD-10-CM

## 2024-03-05 DIAGNOSIS — G89.29 CHRONIC PAIN OF RIGHT KNEE: Primary | ICD-10-CM

## 2024-03-05 DIAGNOSIS — M70.61 TROCHANTERIC BURSITIS OF RIGHT HIP: ICD-10-CM

## 2024-03-05 DIAGNOSIS — M48.062 SPINAL STENOSIS OF LUMBAR REGION WITH NEUROGENIC CLAUDICATION: ICD-10-CM

## 2024-03-05 PROCEDURE — 20610 DRAIN/INJ JOINT/BURSA W/O US: CPT | Performed by: PHYSICIAN ASSISTANT

## 2024-03-05 PROCEDURE — 99214 OFFICE O/P EST MOD 30 MIN: CPT | Performed by: PHYSICIAN ASSISTANT

## 2024-03-05 RX ORDER — BUPIVACAINE HYDROCHLORIDE 5 MG/ML
2 INJECTION, SOLUTION PERINEURAL ONCE
Status: COMPLETED | OUTPATIENT
Start: 2024-03-05 | End: 2024-03-05

## 2024-03-05 RX ORDER — BETAMETHASONE SODIUM PHOSPHATE AND BETAMETHASONE ACETATE 3; 3 MG/ML; MG/ML
12 INJECTION, SUSPENSION INTRA-ARTICULAR; INTRALESIONAL; INTRAMUSCULAR; SOFT TISSUE ONCE
Status: COMPLETED | OUTPATIENT
Start: 2024-03-05 | End: 2024-03-05

## 2024-03-05 RX ORDER — LIDOCAINE HYDROCHLORIDE 10 MG/ML
2 INJECTION, SOLUTION INFILTRATION; PERINEURAL ONCE
Status: COMPLETED | OUTPATIENT
Start: 2024-03-05 | End: 2024-03-05

## 2024-03-05 RX ADMIN — BETAMETHASONE SODIUM PHOSPHATE AND BETAMETHASONE ACETATE 12 MG: 3; 3 INJECTION, SUSPENSION INTRA-ARTICULAR; INTRALESIONAL; INTRAMUSCULAR; SOFT TISSUE at 09:51

## 2024-03-05 RX ADMIN — BUPIVACAINE HYDROCHLORIDE 10 MG: 5 INJECTION, SOLUTION PERINEURAL at 09:52

## 2024-03-05 RX ADMIN — LIDOCAINE HYDROCHLORIDE 2 ML: 10 INJECTION, SOLUTION INFILTRATION; PERINEURAL at 09:52

## 2024-03-05 NOTE — PROGRESS NOTES
No acute fracture, osseous destruction, or periostitis.  Right  lumbosacral pseudoarthrosis.    Joints: Minimal right hip marginal osteophyte formation.  No hip dislocation.  Relative preservation of the right sacroiliac joint.  Minimal symphysis pubis  degenerative change.    Soft tissues: Calcific atherosclerosis.  Incidental right sterilization clips  noted.  Impression: 1. No acute fracture or hip dislocation.  2. Minimal right hip degenerative joint disease.      AP bilateral knees standing lateral view of the right knee from 3/5/2024 available for independent review demonstrate some mild medial and patellofemoral compartmental narrowing.  Minimal spurring present no evidence of acute fracture.    AP and lateral views of the right hip available for review from 2/26/2024 demonstrate some very early DJD of the right hip no evidence of acute fracture, AVN or BIANCA     Orders Placed This Encounter   Procedures    XR KNEE RIGHT (1-2 VIEWS)     Standing Status:   Future     Number of Occurrences:   1     Standing Expiration Date:   3/4/2025       Assessment and Plan:  1. Chronic pain of right knee    2. Trochanteric bursitis of right hip    3. Spinal stenosis of lumbar region with neurogenic claudication          PLAN:  Lenora Carbajal is a 53 y.o. old female with right hip pain that is consistent with right trochanteric bursitis. patient with history of lumbar stenosis.  A lot of her symptomology is consistent with piriformis syndrome versus lumbar radiculopathy secondary to lumbar stenosis, which was  fairly advanced on MRI from March, 2023.  She is anticipating undergoing repeat MRI in the near upcoming weeks which I strongly advise continuing with and following with pain management as this could be a likely etiology of her leg pain.  However patient's right lateral hip pain is most consistent with trochanteric bursitis.    We discussed treatment options available to her, including nonoperative and operative

## 2024-03-13 ENCOUNTER — HOSPITAL ENCOUNTER (OUTPATIENT)
Facility: CLINIC | Age: 54
Setting detail: SPECIMEN
Discharge: HOME | End: 2024-03-13
Payer: COMMERCIAL

## 2024-03-13 PROCEDURE — 87338 HPYLORI STOOL AG IA: CPT

## 2024-03-13 PROCEDURE — 83520 IMMUNOASSAY QUANT NOS NONAB: CPT

## 2024-03-13 PROCEDURE — 87506 IADNA-DNA/RNA PROBE TQ 6-11: CPT

## 2024-03-14 LAB
MICROORGANISM/AGENT SPEC: NEGATIVE
SPECIMEN DESCRIPTION: NORMAL

## 2024-03-16 LAB
CAMPYLOBACTER DNA SPEC NAA+PROBE: NORMAL
ETEC ELTA+ESTB GENES STL QL NAA+PROBE: NORMAL
P SHIGELLOIDES DNA STL QL NAA+PROBE: NORMAL
SALMONELLA DNA SPEC QL NAA+PROBE: NORMAL
SHIGA TOXIN STX GENE SPEC NAA+PROBE: NORMAL
SHIGELLA DNA SPEC QL NAA+PROBE: NORMAL
SPECIMEN DESCRIPTION: NORMAL
V CHOL+PARA RFBL+TRKH+TNAA STL QL NAA+PR: NORMAL
Y ENTERO RECN STL QL NAA+PROBE: NORMAL

## 2024-03-18 LAB — FECAL PANCREATIC ELASTASE-1: 517 UG/G

## 2024-03-22 ENCOUNTER — HOSPITAL ENCOUNTER (OUTPATIENT)
Dept: MRI IMAGING | Facility: CLINIC | Age: 54
Discharge: HOME | End: 2024-03-22
Payer: COMMERCIAL

## 2024-03-22 DIAGNOSIS — M54.17 LUMBOSACRAL RADICULOPATHY: ICD-10-CM

## 2024-03-22 PROCEDURE — 72148 MRI LUMBAR SPINE W/O DYE: CPT

## 2024-05-07 ENCOUNTER — TELEPHONE (OUTPATIENT)
Dept: FAMILY MEDICINE CLINIC | Age: 54
End: 2024-05-07

## 2024-05-07 NOTE — TELEPHONE ENCOUNTER
----- Message from Valeria Rachanahao sent at 5/7/2024  1:00 PM EDT -----  Subject: Referral Request    Reason for referral request? Lab work  Provider patient wants to be referred to(if known):     Provider Phone Number(if known):    Additional Information for Provider? Pt would like to get yearly lab work   and check vitamins in her body. Full panel.  ---------------------------------------------------------------------------  --------------  CALL BACK INFO    [phone]; OK to leave message on voicemail  ---------------------------------------------------------------------------  --------------

## 2024-05-07 NOTE — TELEPHONE ENCOUNTER
----- Message from Valeria Knutson sent at 5/7/2024  1:01 PM EDT -----  Subject: Message to Provider    QUESTIONS  Information for Provider? Pt needs new prescription for handi cap sticker   . Please contact pt.  ---------------------------------------------------------------------------  --------------  CALL BACK INFO  [phone]; OK to leave message on voicemail  ---------------------------------------------------------------------------  --------------  SCRIPT ANSWERS  Relationship to Patient? Self

## 2024-05-08 ENCOUNTER — TELEPHONE (OUTPATIENT)
Dept: FAMILY MEDICINE CLINIC | Age: 54
End: 2024-05-08

## 2024-05-08 ENCOUNTER — HOSPITAL ENCOUNTER (OUTPATIENT)
Dept: GENERAL RADIOLOGY | Facility: CLINIC | Age: 54
Discharge: HOME | End: 2024-05-10
Payer: COMMERCIAL

## 2024-05-08 DIAGNOSIS — M25.551 BILATERAL HIP PAIN: ICD-10-CM

## 2024-05-08 DIAGNOSIS — M25.552 BILATERAL HIP PAIN: ICD-10-CM

## 2024-05-08 PROCEDURE — 73521 X-RAY EXAM HIPS BI 2 VIEWS: CPT

## 2024-05-08 RX ORDER — ERGOCALCIFEROL 1.25 MG/1
50000 CAPSULE ORAL WEEKLY
Qty: 4 CAPSULE | Refills: 3 | Status: SHIPPED | OUTPATIENT
Start: 2024-05-08

## 2024-05-08 NOTE — TELEPHONE ENCOUNTER
----- Message from Valeria Knutson sent at 5/7/2024 12:59 PM EDT -----  Subject: Refill Request    QUESTIONS  Name of Medication? Other - Vitamin D  Patient-reported dosage and instructions? 60542 units 1 time a day  How many days do you have left? 0  [facility]? [id number]  Pharmacy phone number (if available)? [phone]  ---------------------------------------------------------------------------  --------------  CALL BACK INFO  What is the best way for the office to contact you? OK to leave message on   voicemail  Preferred Call Back Phone Number? [phone]  ---------------------------------------------------------------------------  --------------  SCRIPT ANSWERS  Relationship to Patient? Self

## 2024-05-29 RX ORDER — ERGOCALCIFEROL 1.25 MG/1
50000 CAPSULE ORAL WEEKLY
Qty: 12 CAPSULE | Refills: 2 | Status: SHIPPED | OUTPATIENT
Start: 2024-05-29

## 2024-06-18 ENCOUNTER — HOSPITAL ENCOUNTER (OUTPATIENT)
Facility: CLINIC | Age: 54
Discharge: HOME | End: 2024-06-18
Payer: COMMERCIAL

## 2024-06-18 LAB
25(OH)D3 SERPL-MCNC: 42.4 NG/ML (ref 30–100)
AFP SERPL-MCNC: 3.5 UG/L
ALBUMIN SERPL-MCNC: 4.6 G/DL (ref 3.5–5.2)
ALBUMIN/GLOB SERPL: 2 {RATIO} (ref 1–2.5)
ALP SERPL-CCNC: 76 U/L (ref 35–104)
ALT SERPL-CCNC: 22 U/L (ref 10–35)
ANION GAP SERPL CALCULATED.3IONS-SCNC: 12 MMOL/L (ref 9–16)
AST SERPL-CCNC: 18 U/L (ref 10–35)
BILIRUB SERPL-MCNC: 0.3 MG/DL (ref 0–1.2)
BUN SERPL-MCNC: 18 MG/DL (ref 6–20)
CALCIUM SERPL-MCNC: 9.6 MG/DL (ref 8.6–10.4)
CHLORIDE SERPL-SCNC: 107 MMOL/L (ref 98–107)
CO2 SERPL-SCNC: 23 MMOL/L (ref 20–31)
CREAT SERPL-MCNC: 0.9 MG/DL (ref 0.5–0.9)
DHEA-S SERPL-MCNC: 25 UG/DL (ref 35.4–256)
ERYTHROCYTE [DISTWIDTH] IN BLOOD BY AUTOMATED COUNT: 13.5 % (ref 11.8–14.4)
GFR, ESTIMATED: 79 ML/MIN/1.73M2
GLUCOSE SERPL-MCNC: 153 MG/DL (ref 74–99)
HAV AB SERPL IA-ACNC: NONREACTIVE
HBV CORE AB SER QL: NONREACTIVE
HBV SURFACE AB SERPL IA-ACNC: <3.5 MIU/ML
HBV SURFACE AG SERPL QL IA: NONREACTIVE
HCT VFR BLD AUTO: 38.8 % (ref 36.3–47.1)
HCV AB SERPL QL IA: NONREACTIVE
HGB BLD-MCNC: 13.3 G/DL (ref 11.9–15.1)
INR PPP: 0.9
MCH RBC QN AUTO: 29 PG (ref 25.2–33.5)
MCHC RBC AUTO-ENTMCNC: 34.3 G/DL (ref 28.4–34.8)
MCV RBC AUTO: 84.7 FL (ref 82.6–102.9)
NRBC BLD-RTO: 0 PER 100 WBC
PLATELET # BLD AUTO: 166 K/UL (ref 138–453)
PMV BLD AUTO: 11.7 FL (ref 8.1–13.5)
POTASSIUM SERPL-SCNC: 4 MMOL/L (ref 3.7–5.3)
PROT SERPL-MCNC: 7.2 G/DL (ref 6.6–8.7)
PROTHROMBIN TIME: 9.9 SEC (ref 9.4–12.6)
RBC # BLD AUTO: 4.58 M/UL (ref 3.95–5.11)
SODIUM SERPL-SCNC: 142 MMOL/L (ref 136–145)
T3FREE SERPL-MCNC: 1.9 PG/ML (ref 2–4.4)
T4 FREE SERPL-MCNC: 0.9 NG/DL (ref 0.92–1.68)
TESTOST SERPL-MCNC: <3 NG/DL (ref 3–41)
TSH SERPL DL<=0.05 MIU/L-ACNC: 0.84 UIU/ML (ref 0.27–4.2)
WBC OTHER # BLD: 14.5 K/UL (ref 3.5–11.3)

## 2024-06-18 PROCEDURE — 86376 MICROSOMAL ANTIBODY EACH: CPT

## 2024-06-18 PROCEDURE — 82088 ASSAY OF ALDOSTERONE: CPT

## 2024-06-18 PROCEDURE — 36415 COLL VENOUS BLD VENIPUNCTURE: CPT

## 2024-06-18 PROCEDURE — 86803 HEPATITIS C AB TEST: CPT

## 2024-06-18 PROCEDURE — 84439 ASSAY OF FREE THYROXINE: CPT

## 2024-06-18 PROCEDURE — 84244 ASSAY OF RENIN: CPT

## 2024-06-18 PROCEDURE — 85027 COMPLETE CBC AUTOMATED: CPT

## 2024-06-18 PROCEDURE — 84403 ASSAY OF TOTAL TESTOSTERONE: CPT

## 2024-06-18 PROCEDURE — 86708 HEPATITIS A ANTIBODY: CPT

## 2024-06-18 PROCEDURE — 86317 IMMUNOASSAY INFECTIOUS AGENT: CPT

## 2024-06-18 PROCEDURE — 82306 VITAMIN D 25 HYDROXY: CPT

## 2024-06-18 PROCEDURE — 84443 ASSAY THYROID STIM HORMONE: CPT

## 2024-06-18 PROCEDURE — 82627 DEHYDROEPIANDROSTERONE: CPT

## 2024-06-18 PROCEDURE — 87340 HEPATITIS B SURFACE AG IA: CPT

## 2024-06-18 PROCEDURE — 84481 FREE ASSAY (FT-3): CPT

## 2024-06-18 PROCEDURE — 82105 ALPHA-FETOPROTEIN SERUM: CPT

## 2024-06-18 PROCEDURE — 86704 HEP B CORE ANTIBODY TOTAL: CPT

## 2024-06-18 PROCEDURE — 80053 COMPREHEN METABOLIC PANEL: CPT

## 2024-06-18 PROCEDURE — 85610 PROTHROMBIN TIME: CPT

## 2024-06-18 PROCEDURE — 82024 ASSAY OF ACTH: CPT

## 2024-06-18 PROCEDURE — 86709 HEPATITIS A IGM ANTIBODY: CPT

## 2024-06-18 PROCEDURE — 83036 HEMOGLOBIN GLYCOSYLATED A1C: CPT

## 2024-06-18 PROCEDURE — 83835 ASSAY OF METANEPHRINES: CPT

## 2024-06-19 LAB
ACTH PLAS-MCNC: 3 PG/ML (ref 7–63)
EST. AVERAGE GLUCOSE BLD GHB EST-MCNC: 137 MG/DL
HBA1C MFR BLD: 6.4 % (ref 4–6)

## 2024-06-20 LAB
ALDOST SERPL-MCNC: 7.7 NG/DL
THYROPEROXIDASE AB SERPL IA-ACNC: <4 IU/ML (ref 0–25)

## 2024-06-21 LAB — RENIN PLAS-CCNC: <0.1 NG/ML/HR

## 2024-06-23 LAB
METANEPH/PLASMA INTERP: NORMAL
METANEPHRINE: 0.12 NMOL/L (ref 0–0.49)
NORMETANEPHRINE PLASMA: 0.48 NMOL/L (ref 0–0.89)

## 2024-06-28 ENCOUNTER — HOSPITAL ENCOUNTER (OUTPATIENT)
Dept: ULTRASOUND IMAGING | Facility: CLINIC | Age: 54
Discharge: HOME | End: 2024-06-28
Attending: NURSE PRACTITIONER
Payer: COMMERCIAL

## 2024-06-28 DIAGNOSIS — K74.69 FLORID CIRRHOSIS (HCC): ICD-10-CM

## 2024-06-28 PROCEDURE — 76705 ECHO EXAM OF ABDOMEN: CPT

## 2024-06-29 ENCOUNTER — HOSPITAL ENCOUNTER (OUTPATIENT)
Facility: CLINIC | Age: 54
Discharge: HOME | End: 2024-06-29
Payer: COMMERCIAL

## 2024-06-29 LAB
CORTIS SERPL-MCNC: 0.9 UG/DL (ref 2.5–19.5)
CORTISOL COLLECTION INFO: 745

## 2024-06-29 PROCEDURE — 82533 TOTAL CORTISOL: CPT

## 2024-06-29 PROCEDURE — 36415 COLL VENOUS BLD VENIPUNCTURE: CPT

## 2024-06-29 PROCEDURE — 80299 QUANTITATIVE ASSAY DRUG: CPT

## 2024-07-03 LAB — DEXAMETHASONE: 634.8 NG/DL

## 2024-07-09 ENCOUNTER — TELEPHONE (OUTPATIENT)
Dept: FAMILY MEDICINE CLINIC | Age: 54
End: 2024-07-09

## 2024-07-09 NOTE — TELEPHONE ENCOUNTER
Patient is calling asking to have a referral sent to Dr. Elizabet Sparks Neurosurgeon for her back.    Please advise

## 2024-07-17 ENCOUNTER — OFFICE VISIT (OUTPATIENT)
Dept: FAMILY MEDICINE CLINIC | Age: 54
End: 2024-07-17
Payer: COMMERCIAL

## 2024-07-17 VITALS
HEIGHT: 62 IN | SYSTOLIC BLOOD PRESSURE: 134 MMHG | BODY MASS INDEX: 26.5 KG/M2 | TEMPERATURE: 97.9 F | WEIGHT: 144 LBS | OXYGEN SATURATION: 97 % | HEART RATE: 83 BPM | DIASTOLIC BLOOD PRESSURE: 79 MMHG

## 2024-07-17 DIAGNOSIS — M47.816 LUMBAR SPONDYLOSIS: ICD-10-CM

## 2024-07-17 DIAGNOSIS — M48.062 PSEUDOCLAUDICATION SYNDROME: ICD-10-CM

## 2024-07-17 DIAGNOSIS — Z00.01 ENCOUNTER FOR WELL ADULT EXAM WITH ABNORMAL FINDINGS: Primary | ICD-10-CM

## 2024-07-17 DIAGNOSIS — E11.9 TYPE 2 DIABETES MELLITUS WITHOUT COMPLICATION, WITHOUT LONG-TERM CURRENT USE OF INSULIN (HCC): ICD-10-CM

## 2024-07-17 DIAGNOSIS — Z23 NEED FOR PROPHYLACTIC VACCINATION AND INOCULATION AGAINST VARICELLA: ICD-10-CM

## 2024-07-17 PROCEDURE — 99213 OFFICE O/P EST LOW 20 MIN: CPT | Performed by: FAMILY MEDICINE

## 2024-07-17 PROCEDURE — 99396 PREV VISIT EST AGE 40-64: CPT | Performed by: FAMILY MEDICINE

## 2024-07-17 RX ORDER — GLIPIZIDE 10 MG/1
TABLET ORAL
COMMUNITY
End: 2024-07-17

## 2024-07-17 RX ORDER — LEVOFLOXACIN 500 MG/1
TABLET, FILM COATED ORAL
COMMUNITY

## 2024-07-17 RX ORDER — LEVOTHYROXINE SODIUM 0.03 MG/1
25 TABLET ORAL DAILY
COMMUNITY

## 2024-07-17 RX ORDER — LISINOPRIL 5 MG/1
2.5 TABLET ORAL DAILY
Qty: 15 TABLET | Refills: 5 | Status: SHIPPED | OUTPATIENT
Start: 2024-07-17

## 2024-07-17 RX ORDER — INSULIN ASPART 100 [IU]/ML
INJECTION, SOLUTION INTRAVENOUS; SUBCUTANEOUS
COMMUNITY
End: 2024-07-17

## 2024-07-17 RX ORDER — ZOSTER VACCINE RECOMBINANT, ADJUVANTED 50 MCG/0.5
0.5 KIT INTRAMUSCULAR ONCE
Qty: 0.5 ML | Refills: 1 | Status: SHIPPED | OUTPATIENT
Start: 2024-07-17 | End: 2024-07-17

## 2024-07-17 SDOH — ECONOMIC STABILITY: FOOD INSECURITY: WITHIN THE PAST 12 MONTHS, THE FOOD YOU BOUGHT JUST DIDN'T LAST AND YOU DIDN'T HAVE MONEY TO GET MORE.: NEVER TRUE

## 2024-07-17 SDOH — ECONOMIC STABILITY: FOOD INSECURITY: WITHIN THE PAST 12 MONTHS, YOU WORRIED THAT YOUR FOOD WOULD RUN OUT BEFORE YOU GOT MONEY TO BUY MORE.: NEVER TRUE

## 2024-07-17 SDOH — ECONOMIC STABILITY: INCOME INSECURITY: HOW HARD IS IT FOR YOU TO PAY FOR THE VERY BASICS LIKE FOOD, HOUSING, MEDICAL CARE, AND HEATING?: NOT HARD AT ALL

## 2024-07-17 ASSESSMENT — PATIENT HEALTH QUESTIONNAIRE - PHQ9
SUM OF ALL RESPONSES TO PHQ QUESTIONS 1-9: 1
SUM OF ALL RESPONSES TO PHQ9 QUESTIONS 1 & 2: 1
SUM OF ALL RESPONSES TO PHQ QUESTIONS 1-9: 1
2. FEELING DOWN, DEPRESSED OR HOPELESS: SEVERAL DAYS
SUM OF ALL RESPONSES TO PHQ QUESTIONS 1-9: 1
SUM OF ALL RESPONSES TO PHQ QUESTIONS 1-9: 1
1. LITTLE INTEREST OR PLEASURE IN DOING THINGS: NOT AT ALL

## 2024-07-17 NOTE — PATIENT INSTRUCTIONS
Thank you for choosing [facility].  We know you have options when it comes to your healthcare; we appreciate that you chose us. Our goal is to provide exceptional  service and world class care to every patient.  You will be receiving a survey via email or text message asking for your feedback.  Please take a few minutes to share your thoughts about your recent visit. Your comments helps us understand what we do well and ways we can improve.  Thank you in advance for your valuable feedback.           Well Visit, Ages 18 to 65: Care Instructions  Well visits can help you stay healthy. Your doctor has checked your overall health and may have suggested ways to take good care of yourself. Your doctor also may have recommended tests. You can help prevent illness with healthy eating, good sleep, vaccinations, regular exercise, and other steps.    Get the tests that you and your doctor decide on. Depending on your age and risks, examples might include screening for diabetes; hepatitis C; HIV; and cervical, breast, lung, and colon cancer. Screening helps find diseases before any symptoms appear.   Eat healthy foods. Choose fruits, vegetables, whole grains, lean protein, and low-fat dairy foods. Limit saturated fat and reduce salt.     Limit alcohol. Men should have no more than 2 drinks a day. Women should have no more than 1. For some people, no alcohol is the best choice.   Exercise. Get at least 30 minutes of exercise on most days of the week. Walking can be a good choice.     Reach and stay at your healthy weight. This will lower your risk for many health problems.   Take care of your mental health. Try to stay connected with friends, family, and community, and find ways to manage stress.     If you're feeling depressed or hopeless, talk to someone. A counselor can help. If you don't have a counselor, talk to your doctor.   Talk to your doctor if you think you may have a problem with alcohol or drug use. This includes

## 2024-07-17 NOTE — PROGRESS NOTES
AFLURIA, (age 3 y+), PF, 0.5mL 12/11/2018, 12/04/2019, 11/05/2020    Influenza, Triv, 3 Years and older, IM, PF (Afluria 5yrs and older) 09/16/2016    Pneumococcal, PPSV23, PNEUMOVAX 23, (age 2y+), SC/IM, 0.5mL 11/05/2020    TDaP, ADACEL (age 10y-64y), BOOSTRIX (age 10y+), IM, 0.5mL 02/17/2014, 12/11/2018    Tetanus 01/01/2019        Health Maintenance Due   Topic Date Due    HIV screen  Never done    Hepatitis A vaccine (1 of 2 - Risk 2-dose series) Never done    Cervical cancer screen  Never done    Diabetic foot exam  06/14/2018    Diabetic retinal exam  02/22/2019    Shingles vaccine (1 of 2) Never done    Pneumococcal 0-64 years Vaccine (2 of 2 - PCV) 11/05/2021    Breast cancer screen  07/15/2023    COVID-19 Vaccine (3 - 2023-24 season) 09/01/2023    Diabetic Alb to Cr ratio (uACR) test  07/31/2024    Lipids  07/31/2024     Recommendations for Preventive Services Due: see orders and patient instructions/AVS.    (Please note that portions of this note were completed with a voice recognition program. Efforts were made to edit the dictations but occasionally words are mis-transcribed.)

## 2024-07-24 ENCOUNTER — HOSPITAL ENCOUNTER (OUTPATIENT)
Facility: CLINIC | Age: 54
Discharge: HOME | End: 2024-07-24
Payer: COMMERCIAL

## 2024-07-24 PROCEDURE — 36415 COLL VENOUS BLD VENIPUNCTURE: CPT

## 2024-07-24 PROCEDURE — 84439 ASSAY OF FREE THYROXINE: CPT

## 2024-07-24 PROCEDURE — 82088 ASSAY OF ALDOSTERONE: CPT

## 2024-07-24 PROCEDURE — 84244 ASSAY OF RENIN: CPT

## 2024-07-25 LAB — T4 FREE SERPL-MCNC: 1.1 NG/DL (ref 0.92–1.68)

## 2024-07-27 LAB
ALDOST SERPL-MCNC: 8.1 NG/DL
RENIN PLAS-CCNC: <0.1 NG/ML/HR

## 2024-07-28 ENCOUNTER — HOSPITAL ENCOUNTER (OUTPATIENT)
Facility: CLINIC | Age: 54
Setting detail: SPECIMEN
Discharge: HOME | End: 2024-07-28
Payer: COMMERCIAL

## 2024-07-28 PROCEDURE — 82530 CORTISOL FREE: CPT

## 2024-07-28 PROCEDURE — 81050 URINALYSIS VOLUME MEASURE: CPT

## 2024-08-05 LAB
COLLECT DURATION TIME SPEC: 24 H
CORTISOL (UR), FREE: 9.5 UG/D
CORTISOL URINE, FREE (/G CRT): 11.5 UG/L
CORTISOL, URINE RATIO CREATININE: 7.19 UG/G CRT
CORTISOL, URINE: NORMAL
CREAT 24H UR-MCNC: 160 MG/DL
CREATININE URINE /24 HR: 1323 MG/D (ref 500–1400)
SPECIMEN VOL ?TM UR: 827 ML

## 2024-08-30 DIAGNOSIS — E11.9 TYPE 2 DIABETES MELLITUS WITHOUT COMPLICATION, WITHOUT LONG-TERM CURRENT USE OF INSULIN (HCC): ICD-10-CM

## 2024-08-31 RX ORDER — LISINOPRIL 5 MG/1
2.5 TABLET ORAL DAILY
Qty: 45 TABLET | Refills: 1 | Status: SHIPPED | OUTPATIENT
Start: 2024-08-31

## 2024-09-10 ENCOUNTER — TELEMEDICINE (OUTPATIENT)
Dept: NEUROSURGERY | Age: 54
End: 2024-09-10
Payer: COMMERCIAL

## 2024-09-10 DIAGNOSIS — Z98.890 HISTORY OF LAMINECTOMY: ICD-10-CM

## 2024-09-10 DIAGNOSIS — M99.79 FORAMINAL STENOSIS DUE TO INTERVERTEBRAL DISC DISEASE: Primary | ICD-10-CM

## 2024-09-10 DIAGNOSIS — M47.816 LUMBAR SPONDYLOSIS: ICD-10-CM

## 2024-09-10 DIAGNOSIS — M51.9 FORAMINAL STENOSIS DUE TO INTERVERTEBRAL DISC DISEASE: Primary | ICD-10-CM

## 2024-09-10 PROCEDURE — 99214 OFFICE O/P EST MOD 30 MIN: CPT | Performed by: NEUROLOGICAL SURGERY

## 2024-09-16 ENCOUNTER — HOSPITAL ENCOUNTER (OUTPATIENT)
Facility: CLINIC | Age: 54
Discharge: HOME | End: 2024-09-18
Attending: NEUROLOGICAL SURGERY
Payer: COMMERCIAL

## 2024-09-16 ENCOUNTER — HOSPITAL ENCOUNTER (OUTPATIENT)
Dept: GENERAL RADIOLOGY | Facility: CLINIC | Age: 54
Discharge: HOME | End: 2024-09-18
Attending: NEUROLOGICAL SURGERY
Payer: COMMERCIAL

## 2024-09-16 ENCOUNTER — HOSPITAL ENCOUNTER (OUTPATIENT)
Dept: CT IMAGING | Facility: CLINIC | Age: 54
Discharge: HOME | End: 2024-09-18
Attending: NEUROLOGICAL SURGERY
Payer: COMMERCIAL

## 2024-09-16 DIAGNOSIS — M51.9 FORAMINAL STENOSIS DUE TO INTERVERTEBRAL DISC DISEASE: ICD-10-CM

## 2024-09-16 DIAGNOSIS — M99.79 FORAMINAL STENOSIS DUE TO INTERVERTEBRAL DISC DISEASE: ICD-10-CM

## 2024-09-16 PROCEDURE — 72120 X-RAY BEND ONLY L-S SPINE: CPT

## 2024-09-16 PROCEDURE — 72131 CT LUMBAR SPINE W/O DYE: CPT

## 2024-09-27 ENCOUNTER — HOSPITAL ENCOUNTER (OUTPATIENT)
Facility: CLINIC | Age: 54
Setting detail: SPECIMEN
Discharge: HOME | End: 2024-09-27
Payer: COMMERCIAL

## 2024-09-27 ENCOUNTER — TELEPHONE (OUTPATIENT)
Dept: FAMILY MEDICINE CLINIC | Age: 54
End: 2024-09-27

## 2024-09-27 DIAGNOSIS — R82.90 ABNORMAL URINE ODOR: Primary | ICD-10-CM

## 2024-09-27 DIAGNOSIS — R39.15 URGENCY OF URINATION: ICD-10-CM

## 2024-09-27 DIAGNOSIS — R82.90 ABNORMAL URINE ODOR: ICD-10-CM

## 2024-09-27 PROCEDURE — 87086 URINE CULTURE/COLONY COUNT: CPT

## 2024-09-28 LAB
MICROORGANISM SPEC CULT: NO GROWTH
SPECIMEN DESCRIPTION: NORMAL

## 2024-09-30 RX ORDER — LINACLOTIDE 145 UG/1
145 CAPSULE, GELATIN COATED ORAL
Qty: 30 CAPSULE | Refills: 3 | Status: SHIPPED | OUTPATIENT
Start: 2024-09-30

## 2024-10-03 ENCOUNTER — OFFICE VISIT (OUTPATIENT)
Dept: NEUROSURGERY | Age: 54
End: 2024-10-03
Payer: COMMERCIAL

## 2024-10-03 ENCOUNTER — HOSPITAL ENCOUNTER (OUTPATIENT)
Facility: CLINIC | Age: 54
Discharge: HOME | End: 2024-10-03
Payer: COMMERCIAL

## 2024-10-03 VITALS
HEART RATE: 76 BPM | WEIGHT: 145.2 LBS | DIASTOLIC BLOOD PRESSURE: 84 MMHG | SYSTOLIC BLOOD PRESSURE: 131 MMHG | HEIGHT: 62 IN | BODY MASS INDEX: 26.72 KG/M2

## 2024-10-03 DIAGNOSIS — M53.3 SI (SACROILIAC) JOINT DYSFUNCTION: ICD-10-CM

## 2024-10-03 DIAGNOSIS — E66.01 CLASS 2 SEVERE OBESITY DUE TO EXCESS CALORIES WITH SERIOUS COMORBIDITY IN ADULT, UNSPECIFIED BMI: ICD-10-CM

## 2024-10-03 DIAGNOSIS — E78.00 PURE HYPERCHOLESTEROLEMIA: ICD-10-CM

## 2024-10-03 DIAGNOSIS — M48.02 CERVICAL STENOSIS OF SPINAL CANAL: ICD-10-CM

## 2024-10-03 DIAGNOSIS — E66.812 CLASS 2 SEVERE OBESITY DUE TO EXCESS CALORIES WITH SERIOUS COMORBIDITY IN ADULT, UNSPECIFIED BMI: ICD-10-CM

## 2024-10-03 DIAGNOSIS — R06.02 SOB (SHORTNESS OF BREATH): ICD-10-CM

## 2024-10-03 DIAGNOSIS — R06.02 SHORTNESS OF BREATH: ICD-10-CM

## 2024-10-03 DIAGNOSIS — I63.89 CEREBROVASCULAR ACCIDENT (CVA) DUE TO OTHER MECHANISM (HCC): ICD-10-CM

## 2024-10-03 DIAGNOSIS — R07.9 CHEST PAIN, UNSPECIFIED TYPE: ICD-10-CM

## 2024-10-03 DIAGNOSIS — E11.9 CONTROLLED TYPE 2 DIABETES MELLITUS WITHOUT COMPLICATION, UNSPECIFIED WHETHER LONG TERM INSULIN USE (HCC): ICD-10-CM

## 2024-10-03 DIAGNOSIS — I25.10 CORONARY ARTERY DISEASE INVOLVING NATIVE CORONARY ARTERY OF NATIVE HEART WITHOUT ANGINA PECTORIS: ICD-10-CM

## 2024-10-03 DIAGNOSIS — I10 HYPERTENSION, ESSENTIAL: ICD-10-CM

## 2024-10-03 DIAGNOSIS — M48.062 LUMBAR STENOSIS WITH NEUROGENIC CLAUDICATION: ICD-10-CM

## 2024-10-03 DIAGNOSIS — I20.0 UNSTABLE ANGINA PECTORIS (HCC): ICD-10-CM

## 2024-10-03 DIAGNOSIS — I20.9 TYPICAL ANGINA (HCC): ICD-10-CM

## 2024-10-03 DIAGNOSIS — M51.362 DEGENERATION OF INTERVERTEBRAL DISC OF LUMBAR REGION WITH DISCOGENIC BACK PAIN AND LOWER EXTREMITY PAIN: Primary | ICD-10-CM

## 2024-10-03 LAB
CHOLEST SERPL-MCNC: 186 MG/DL (ref 0–199)
CHOLESTEROL/HDL RATIO: 3
HDLC SERPL-MCNC: 56 MG/DL
LDLC SERPL CALC-MCNC: 104 MG/DL (ref 0–100)
TRIGL SERPL-MCNC: 133 MG/DL
VLDLC SERPL CALC-MCNC: 27 MG/DL

## 2024-10-03 PROCEDURE — 82172 ASSAY OF APOLIPOPROTEIN: CPT

## 2024-10-03 PROCEDURE — 36415 COLL VENOUS BLD VENIPUNCTURE: CPT

## 2024-10-03 PROCEDURE — 99214 OFFICE O/P EST MOD 30 MIN: CPT | Performed by: NEUROLOGICAL SURGERY

## 2024-10-03 PROCEDURE — 80061 LIPID PANEL: CPT

## 2024-10-03 NOTE — PROGRESS NOTES
Evelin Mahmood MD   fluticasone (FLONASE) 50 MCG/ACT nasal spray 1 spray by Each Nostril route daily 11/30/23  Yes Rachel Parra MD   albuterol sulfate HFA (VENTOLIN HFA) 108 (90 Base) MCG/ACT inhaler Inhale 2 puffs into the lungs every 6 hours as needed for Wheezing 1/11/22  Yes Rachel Parra MD   rosuvastatin (CRESTOR) 10 MG tablet TAKE 1 TABLET BY MOUTH EVERY DAY 10/21/20  Yes Clarita Gallegos MD   pantoprazole sodium (PROTONIX) 40 MG PACK packet Take 1 packet by mouth daily   Yes Clarita Gallegos MD   nitroGLYCERIN (NITROSTAT) 0.4 MG SL tablet Place 1 tablet under the tongue every 5 minutes as needed for Chest pain Dissolve 1 tab under tongue at first sign of chest pain. May repeat every 5 minutes until relief is obtained. If pain persists after taking 3 tabs in a 15-minute period, or the pain is different than is typically experienced, call 9-1-1 immediately.   Yes Clarita Gallegos MD   TRUETEST TEST strip  2/26/16  Yes Clarita Gallegos MD   TRUEPLUS LANCETS 33G MISC  2/26/16  Yes Clarita Gallegos MD   albuterol (PROVENTIL) (2.5 MG/3ML) 0.083% nebulizer solution Take 3 mLs by nebulization every 6 hours as needed for Wheezing 3/16/16  Yes Rachel aPrra MD   ibuprofen (ADVIL;MOTRIN) 800 MG tablet Take 1 tablet by mouth every 8 hours as needed for Pain 6/13/23 10/4/24  Rachel Parra MD       Current Medications:   No current facility-administered medications for this visit.  Facility-Administered Medications Ordered in Other Visits: nitroGLYCERIN (NITROSTAT) SL tablet 0.4 mg, 0.4 mg, SubLINGual, Q5 Min PRN  0.9 % sodium chloride infusion, , IntraVENous, Continuous    Review of system: negative, otherwise described in HPI  PHYSICAL EXAM:       /84 (Site: Left Upper Arm, Position: Sitting, Cuff Size: Medium Adult)   Pulse 76   Ht 1.575 m (5' 2\")   Wt 65.9 kg (145 lb 3.2 oz)   LMP 07/02/2019   BMI 26.56 kg/m²   Physical Exam     Gen: NAD  HEENT: moist mucus

## 2024-10-04 ENCOUNTER — HOSPITAL ENCOUNTER (OUTPATIENT)
Age: 54
Setting detail: OUTPATIENT SURGERY
Discharge: HOME | End: 2024-10-04
Attending: STUDENT IN AN ORGANIZED HEALTH CARE EDUCATION/TRAINING PROGRAM | Admitting: STUDENT IN AN ORGANIZED HEALTH CARE EDUCATION/TRAINING PROGRAM
Payer: COMMERCIAL

## 2024-10-04 VITALS
WEIGHT: 145 LBS | DIASTOLIC BLOOD PRESSURE: 64 MMHG | HEIGHT: 62 IN | HEART RATE: 70 BPM | RESPIRATION RATE: 16 BRPM | BODY MASS INDEX: 26.68 KG/M2 | SYSTOLIC BLOOD PRESSURE: 112 MMHG | TEMPERATURE: 98.2 F | OXYGEN SATURATION: 94 %

## 2024-10-04 DIAGNOSIS — R94.39 ABNORMAL STRESS TEST: ICD-10-CM

## 2024-10-04 LAB
BUN BLD-MCNC: 14 MG/DL (ref 8–26)
CHLORIDE BLD-SCNC: 106 MMOL/L (ref 98–107)
ECHO BSA: 1.7 M2
EGFR, POC: 76 ML/MIN/1.73M2
GLUCOSE BLD-MCNC: 133 MG/DL (ref 74–100)
HCT VFR BLD AUTO: 40 % (ref 36–46)
PLATELET # BLD AUTO: 150 K/UL (ref 138–453)
POC CREATININE: 0.9 MG/DL (ref 0.51–1.19)
POC HEMOGLOBIN (CALC): 13.8 G/DL (ref 12–16)
POTASSIUM BLD-SCNC: 4.4 MMOL/L (ref 3.5–4.5)
SODIUM BLD-SCNC: 143 MMOL/L (ref 138–146)

## 2024-10-04 PROCEDURE — C1769 GUIDE WIRE: HCPCS | Performed by: STUDENT IN AN ORGANIZED HEALTH CARE EDUCATION/TRAINING PROGRAM

## 2024-10-04 PROCEDURE — 6360000004 HC RX CONTRAST MEDICATION: Performed by: STUDENT IN AN ORGANIZED HEALTH CARE EDUCATION/TRAINING PROGRAM

## 2024-10-04 PROCEDURE — 82565 ASSAY OF CREATININE: CPT

## 2024-10-04 PROCEDURE — 7100000010 HC PHASE II RECOVERY - FIRST 15 MIN: Performed by: STUDENT IN AN ORGANIZED HEALTH CARE EDUCATION/TRAINING PROGRAM

## 2024-10-04 PROCEDURE — 85049 AUTOMATED PLATELET COUNT: CPT

## 2024-10-04 PROCEDURE — 85014 HEMATOCRIT: CPT

## 2024-10-04 PROCEDURE — 84520 ASSAY OF UREA NITROGEN: CPT

## 2024-10-04 PROCEDURE — 6360000002 HC RX W HCPCS: Performed by: STUDENT IN AN ORGANIZED HEALTH CARE EDUCATION/TRAINING PROGRAM

## 2024-10-04 PROCEDURE — 7100000011 HC PHASE II RECOVERY - ADDTL 15 MIN: Performed by: STUDENT IN AN ORGANIZED HEALTH CARE EDUCATION/TRAINING PROGRAM

## 2024-10-04 PROCEDURE — 84132 ASSAY OF SERUM POTASSIUM: CPT

## 2024-10-04 PROCEDURE — 2500000003 HC RX 250 WO HCPCS: Performed by: STUDENT IN AN ORGANIZED HEALTH CARE EDUCATION/TRAINING PROGRAM

## 2024-10-04 PROCEDURE — 2580000003 HC RX 258: Performed by: STUDENT IN AN ORGANIZED HEALTH CARE EDUCATION/TRAINING PROGRAM

## 2024-10-04 PROCEDURE — 84295 ASSAY OF SERUM SODIUM: CPT

## 2024-10-04 PROCEDURE — 93458 L HRT ARTERY/VENTRICLE ANGIO: CPT | Performed by: STUDENT IN AN ORGANIZED HEALTH CARE EDUCATION/TRAINING PROGRAM

## 2024-10-04 PROCEDURE — 99152 MOD SED SAME PHYS/QHP 5/>YRS: CPT | Performed by: STUDENT IN AN ORGANIZED HEALTH CARE EDUCATION/TRAINING PROGRAM

## 2024-10-04 PROCEDURE — 6370000000 HC RX 637 (ALT 250 FOR IP): Performed by: STUDENT IN AN ORGANIZED HEALTH CARE EDUCATION/TRAINING PROGRAM

## 2024-10-04 PROCEDURE — 82947 ASSAY GLUCOSE BLOOD QUANT: CPT

## 2024-10-04 PROCEDURE — 2709999900 HC NON-CHARGEABLE SUPPLY: Performed by: STUDENT IN AN ORGANIZED HEALTH CARE EDUCATION/TRAINING PROGRAM

## 2024-10-04 PROCEDURE — 82435 ASSAY OF BLOOD CHLORIDE: CPT

## 2024-10-04 RX ORDER — ASPIRIN 325 MG
325 TABLET ORAL ONCE
Status: COMPLETED | OUTPATIENT
Start: 2024-10-04 | End: 2024-10-04

## 2024-10-04 RX ORDER — MIDAZOLAM HYDROCHLORIDE 1 MG/ML
INJECTION INTRAMUSCULAR; INTRAVENOUS PRN
Status: DISCONTINUED | OUTPATIENT
Start: 2024-10-04 | End: 2024-10-04 | Stop reason: HOSPADM

## 2024-10-04 RX ORDER — FENTANYL CITRATE 50 UG/ML
INJECTION, SOLUTION INTRAMUSCULAR; INTRAVENOUS PRN
Status: DISCONTINUED | OUTPATIENT
Start: 2024-10-04 | End: 2024-10-04 | Stop reason: HOSPADM

## 2024-10-04 RX ORDER — SODIUM CHLORIDE 9 MG/ML
INJECTION, SOLUTION INTRAVENOUS CONTINUOUS
Status: DISCONTINUED | OUTPATIENT
Start: 2024-10-04 | End: 2024-10-04 | Stop reason: HOSPADM

## 2024-10-04 RX ORDER — IOPAMIDOL 755 MG/ML
INJECTION, SOLUTION INTRAVASCULAR PRN
Status: DISCONTINUED | OUTPATIENT
Start: 2024-10-04 | End: 2024-10-04 | Stop reason: HOSPADM

## 2024-10-04 RX ADMIN — SODIUM CHLORIDE: 9 INJECTION, SOLUTION INTRAVENOUS at 08:22

## 2024-10-04 RX ADMIN — ASPIRIN 325 MG: 325 TABLET ORAL at 08:27

## 2024-10-04 NOTE — PROGRESS NOTES
Admitted to pcc room 3 per ambulation. Assessment and vitals as charted.  All procedures explained prior to implementation.  Patient is very nervous and anxious; emotional support and reassurance given.  Family in attendance.  Patient ready for procedure

## 2024-10-04 NOTE — H&P
[facility]    H&P               Today's Date: 10/4/2024  Patient Name: Lenora Carbajal  Date of admission: 10/4/2024  7:38 AM  Patient's age: 54 y.o., 1970  Admission Dx: Abnormal stress test [R94.39]    Requesting Physician: Mariusz Tello MD    Cardiac Evaluation Reason: Abnormal stress test    History Obtained From: patient and chart review     History of Present Illness:    This patient 54 y.o. years old with past medical history given below.  Patient was seen in our cardiology office with exertional dyspnea and chest pain.  Nuclear stress test was done which showed reversible perfusion defect in the apex.  Patient was advised to have left heart cath for further evaluation.    Past Medical History:   has a past medical history of Anomalous coronary artery origin, Anxiety, Asthma, Back pain, Cirrhosis (HCC), DDD (degenerative disc disease), cervical, DDD (degenerative disc disease), lumbar, Diabetes mellitus (HCC), Fatty liver, History of blood transfusion, Mitral prolapse, Pneumonia, Prolonged emergence from general anesthesia, Thoracic outlet syndrome, and Thyroid goiter.    Past Surgical History:   has a past surgical history that includes Tubal ligation;  section; Cervical spine surgery; hernia repair; Colonoscopy; Endoscopy, colon, diagnostic; bronchoscopy; Cardiac surgery; Tonsillectomy; chg us guidance needle placement img s&i; Finger trigger release (Right, 10/09/2019); liver biopsy; back surgery; and Knee arthroscopy (Right, 2022).     Home Medications:    Prior to Admission medications    Medication Sig Start Date End Date Taking? Authorizing Provider   LINZESS 145 MCG capsule TAKE 1 CAPSULE BY MOUTH EVERY DAY IN THE MORNING BEFORE BREAKFAST 24   Rachel Parra MD   metoprolol succinate (TOPROL XL) 50 MG extended release tablet TAKE 1 TABLET BY MOUTH EVERY DAY 24   Pino Quintanilla S,    lisinopril (PRINIVIL;ZESTRIL) 5 MG tablet TAKE 1/2 TABLET BY MOUTH

## 2024-10-04 NOTE — DISCHARGE INSTRUCTIONS
DISCHARGE INSTRUCTIONS / ARM CARE POST CATHERIZATION    Home Care     Ok to shower in AM. Discontinue pressure dressing and/or armboard in AM.   Do not apply band aids. KEEP SITE CLEAN DRY AND OPEN TO THE AIR.  No powder or lotion.  Do not soak in a pool or tub and do not swim for one week.   If there is any bleeding at the catheter site, apply firm pressure directly over site with your hand until the bleeding stops. If bleeding continues after 3 minutes call 911.   If there is any swelling or firm areas at your puncture site, this could be bleeding under the skin(hematoma), and if you have any concerns seek help immediately.   Drink plenty of fluids after the test. This will flush the x-ray dye from your system.   Return to your normal diet.         NO STRENUOUS LIFTING WITH AFFECTED ARM FOR 3 DAYS ANYTHING HEAVIER THAN 8 TO 10 POUNDS    WATCH FOR SIGNS OF INFECTION /  REDNESS / SWELLING / DRAINAGE / WARMTH / TEMPERATURE GREATER THAN 101    IF AREA BECOMES HARD AND SWOLLEN AND IF YOU ARE AT ALL CONCERNED SEEK HELP IMMEDIATELY    SEEK HELP IMMEDIATELY IF AFFECTED ARM BECOMES COLD / NUMB / SEVERE PAIN / NAILBEDS TURN BLUE     IF ON METFORMIN / GLUCOPHAGE DO NOT RESTART MEDICATION FOR 48 HOURS    CALL 911 if you have symptoms including:   Drooping facial muscles   Changes in vision or speech   Difficulty walking or using your limbs   Change in sensation to affected leg, including numbness, feeling cold, or change in color   Extreme sweating, nausea or vomiting   Dizziness or lightheadedness   Chest pain   Rapid, irregular heartbeat   Palpitations   Cough, shortness of breath, or difficulty breathing   Weakness or fainting   If you think you have an emergency, CALL 911 .      SEDATION / ANALGESIA INFORMATION / HOME GOING ADVICE  You have received the sedation/analgesia medication during your visit    Sedation/analgesia is used during short medical procedures under controlled supervision. The medication will produce

## 2024-10-04 NOTE — PROGRESS NOTES
Patient received post cath to PCC room 3. Assessment obtained.  Post cath pathway initiated. Right radial site with TR band inflated with 11 mL of air intact. No hematoma noted. Patient without complaints.

## 2024-10-05 LAB
APO B100 SERPL-MCNC: 92 MG/DL (ref 60–117)
LPA SERPL-MCNC: 67 MG/DL

## 2024-10-08 ENCOUNTER — APPOINTMENT (OUTPATIENT)
Dept: CT IMAGING | Age: 54
End: 2024-10-08
Payer: COMMERCIAL

## 2024-10-08 ENCOUNTER — HOSPITAL ENCOUNTER (EMERGENCY)
Age: 54
Discharge: HOME | End: 2024-10-08
Attending: EMERGENCY MEDICINE
Payer: COMMERCIAL

## 2024-10-08 ENCOUNTER — APPOINTMENT (OUTPATIENT)
Dept: GENERAL RADIOLOGY | Age: 54
End: 2024-10-08
Payer: COMMERCIAL

## 2024-10-08 VITALS
DIASTOLIC BLOOD PRESSURE: 87 MMHG | TEMPERATURE: 97 F | BODY MASS INDEX: 26.68 KG/M2 | HEART RATE: 80 BPM | HEIGHT: 62 IN | OXYGEN SATURATION: 99 % | RESPIRATION RATE: 19 BRPM | WEIGHT: 145 LBS | SYSTOLIC BLOOD PRESSURE: 158 MMHG

## 2024-10-08 DIAGNOSIS — I74.2: Primary | ICD-10-CM

## 2024-10-08 LAB
ANION GAP SERPL CALCULATED.3IONS-SCNC: 10 MMOL/L (ref 9–16)
BASOPHILS # BLD: 0.04 K/UL (ref 0–0.2)
BASOPHILS NFR BLD: 1 % (ref 0–2)
BUN SERPL-MCNC: 13 MG/DL (ref 6–20)
CALCIUM SERPL-MCNC: 9.8 MG/DL (ref 8.6–10.4)
CHLORIDE SERPL-SCNC: 106 MMOL/L (ref 98–107)
CO2 SERPL-SCNC: 24 MMOL/L (ref 20–31)
CREAT SERPL-MCNC: 0.9 MG/DL (ref 0.5–0.9)
EOSINOPHIL # BLD: 0.24 K/UL (ref 0–0.44)
EOSINOPHILS RELATIVE PERCENT: 3 % (ref 1–4)
ERYTHROCYTE [DISTWIDTH] IN BLOOD BY AUTOMATED COUNT: 12.9 % (ref 11.8–14.4)
GFR, ESTIMATED: 79 ML/MIN/1.73M2
GLUCOSE SERPL-MCNC: 111 MG/DL (ref 74–99)
HCT VFR BLD AUTO: 40.2 % (ref 36.3–47.1)
HGB BLD-MCNC: 13.5 G/DL (ref 11.9–15.1)
IMM GRANULOCYTES # BLD AUTO: 0.03 K/UL (ref 0–0.3)
IMM GRANULOCYTES NFR BLD: 0 %
LYMPHOCYTES NFR BLD: 1.95 K/UL (ref 1.1–3.7)
LYMPHOCYTES RELATIVE PERCENT: 26 % (ref 24–43)
MCH RBC QN AUTO: 28.4 PG (ref 25.2–33.5)
MCHC RBC AUTO-ENTMCNC: 33.6 G/DL (ref 28.4–34.8)
MCV RBC AUTO: 84.5 FL (ref 82.6–102.9)
MONOCYTES NFR BLD: 0.51 K/UL (ref 0.1–1.2)
MONOCYTES NFR BLD: 7 % (ref 3–12)
NEUTROPHILS NFR BLD: 63 % (ref 36–65)
NEUTS SEG NFR BLD: 4.76 K/UL (ref 1.5–8.1)
NRBC BLD-RTO: 0 PER 100 WBC
PLATELET # BLD AUTO: 163 K/UL (ref 138–453)
PMV BLD AUTO: 11 FL (ref 8.1–13.5)
POTASSIUM SERPL-SCNC: 3.6 MMOL/L (ref 3.7–5.3)
RBC # BLD AUTO: 4.76 M/UL (ref 3.95–5.11)
SODIUM SERPL-SCNC: 140 MMOL/L (ref 136–145)
TROPONIN I SERPL HS-MCNC: 7 NG/L (ref 0–14)
TROPONIN I SERPL HS-MCNC: <6 NG/L (ref 0–14)
WBC OTHER # BLD: 7.5 K/UL (ref 3.5–11.3)

## 2024-10-08 PROCEDURE — 85025 COMPLETE CBC W/AUTO DIFF WBC: CPT

## 2024-10-08 PROCEDURE — 6360000004 HC RX CONTRAST MEDICATION

## 2024-10-08 PROCEDURE — 80048 BASIC METABOLIC PNL TOTAL CA: CPT

## 2024-10-08 PROCEDURE — 93005 ELECTROCARDIOGRAM TRACING: CPT

## 2024-10-08 PROCEDURE — 84484 ASSAY OF TROPONIN QUANT: CPT

## 2024-10-08 PROCEDURE — 71045 X-RAY EXAM CHEST 1 VIEW: CPT

## 2024-10-08 PROCEDURE — 6370000000 HC RX 637 (ALT 250 FOR IP)

## 2024-10-08 PROCEDURE — 99285 EMERGENCY DEPT VISIT HI MDM: CPT

## 2024-10-08 PROCEDURE — 73206 CT ANGIO UPR EXTRM W/O&W/DYE: CPT

## 2024-10-08 PROCEDURE — 99284 EMERGENCY DEPT VISIT MOD MDM: CPT | Performed by: SURGERY

## 2024-10-08 RX ORDER — IOPAMIDOL 755 MG/ML
75 INJECTION, SOLUTION INTRAVASCULAR
Status: COMPLETED | OUTPATIENT
Start: 2024-10-08 | End: 2024-10-08

## 2024-10-08 RX ADMIN — IOPAMIDOL 75 ML: 755 INJECTION, SOLUTION INTRAVENOUS at 16:06

## 2024-10-08 RX ADMIN — APIXABAN 5 MG: 5 TABLET, FILM COATED ORAL at 19:18

## 2024-10-08 ASSESSMENT — PAIN DESCRIPTION - DESCRIPTORS: DESCRIPTORS: ACHING

## 2024-10-08 ASSESSMENT — ENCOUNTER SYMPTOMS
ABDOMINAL PAIN: 0
EYE PAIN: 0
BACK PAIN: 0
EYE DISCHARGE: 0
NAUSEA: 0
COLOR CHANGE: 1
VOMITING: 0
COUGH: 0
SHORTNESS OF BREATH: 0

## 2024-10-08 ASSESSMENT — PAIN - FUNCTIONAL ASSESSMENT: PAIN_FUNCTIONAL_ASSESSMENT: 0-10

## 2024-10-08 ASSESSMENT — PAIN SCALES - GENERAL: PAINLEVEL_OUTOF10: 3

## 2024-10-08 ASSESSMENT — PAIN DESCRIPTION - ORIENTATION: ORIENTATION: RIGHT

## 2024-10-08 ASSESSMENT — PAIN DESCRIPTION - LOCATION: LOCATION: ARM

## 2024-10-08 NOTE — CONSULTS
CAPSULE BY MOUTH EVERY DAY IN THE MORNING BEFORE BREAKFAST 30 capsule 3    metoprolol succinate (TOPROL XL) 50 MG extended release tablet TAKE 1 TABLET BY MOUTH EVERY DAY 90 tablet 3    lisinopril (PRINIVIL;ZESTRIL) 5 MG tablet TAKE 1/2 TABLET BY MOUTH DAILY 45 tablet 1    nystatin (MYCOSTATIN) 003330 UNIT/GM cream as needed      pimecrolimus (ELIDEL) 1 % cream as needed      levothyroxine (SYNTHROID) 25 MCG tablet Take 1 tablet by mouth Daily      Semaglutide, 1 MG/DOSE, (OZEMPIC, 1 MG/DOSE,) 4 MG/3ML SOPN sc injection Inject 1 mg into the skin every 7 days 4 Adjustable Dose Pre-filled Pen Syringe 5    vitamin D (ERGOCALCIFEROL) 1.25 MG (05252 UT) CAPS capsule TAKE 1 CAPSULE BY MOUTH ONE TIME PER WEEK 12 capsule 2    ranolazine (RANEXA) 1000 MG extended release tablet TAKE 1 TABLET BY MOUTH EVERY DAY AT NIGHT 90 tablet 3    fluticasone (FLONASE) 50 MCG/ACT nasal spray 1 spray by Each Nostril route daily 32 g 1    ibuprofen (ADVIL;MOTRIN) 800 MG tablet Take 1 tablet by mouth every 8 hours as needed for Pain 30 tablet 1    albuterol sulfate HFA (VENTOLIN HFA) 108 (90 Base) MCG/ACT inhaler Inhale 2 puffs into the lungs every 6 hours as needed for Wheezing 1 each 1    rosuvastatin (CRESTOR) 10 MG tablet TAKE 1 TABLET BY MOUTH EVERY DAY      pantoprazole sodium (PROTONIX) 40 MG PACK packet Take 1 packet by mouth daily      nitroGLYCERIN (NITROSTAT) 0.4 MG SL tablet Place 1 tablet under the tongue every 5 minutes as needed for Chest pain Dissolve 1 tab under tongue at first sign of chest pain. May repeat every 5 minutes until relief is obtained. If pain persists after taking 3 tabs in a 15-minute period, or the pain is different than is typically experienced, call 9-1-1 immediately.      TRUETEST TEST strip   0    TRUEPLUS LANCETS 33G MISC   0    albuterol (PROVENTIL) (2.5 MG/3ML) 0.083% nebulizer solution Take 3 mLs by nebulization every 6 hours as needed for Wheezing 120 each 3     Facility-Administered Medications

## 2024-10-08 NOTE — ED PROVIDER NOTES
[facility]     Emergency Department     Faculty Attestation  3:21 PM EDT      I performed a history and physical examination of the patient and discussed management with the resident. I have reviewed and agree with the resident’s findings including all diagnostic interpretations, and treatment plans as written. Any areas of disagreement are noted on the chart. I was personally present for the key portions of any procedures. I have documented in the chart those procedures where I was not present during the key portions. I have reviewed the emergency nurses triage note. I agree with the chief complaint, past medical history, past surgical history, allergies, medications, social and family history as documented unless otherwise noted below. Documentation of the HPI, Physical Exam and Medical Decision Making performed by scribxochitl is based on my personal performance of the HPI, PE and MDM. For Physician Assistant/ Nurse Practitioner cases/documentation I have personally evaluated this patient and have completed at least one if not all key elements of the E/M (history, physical exam, and MDM). Additional findings are as noted.    Patient here for days after a heart cath with a right radial approach.  Patient has been having pain at the right wrist since then.  And some swelling.  Had an ultrasound today that showed some decreased blood flow to the right radial artery.  And was sent to the ER for evaluation.  Patient also describes some discomfort to her right shoulder that she felt was related to positioning from the heart cath and she has a known rotator cuff injury.    On exam equal warmth noted to bilateral hands capillary refill is less than 2 seconds.  Unable to palpate a pulse in the right radius.  But ulnar pulse is bounding and palpable without difficulty.    There is some edema overlying the distal radial artery, and tenderness to palpation in nodule 
 [facility]  FACULTY HANDOFF       Handoff taken on the following patient from prior Attending Physician:  Pt Name: Lenora Carbajal  PCP:  Rachel Parra MD    Attestation  I was available and discussed any additional care issues that arose and coordinated the management plans with the resident(s) caring for the patient during my duty period. Any areas of disagreement with resident's documentation of care or procedures are noted on the chart. I was personally present for the key portions of any/all procedures during my duty period. I have documented in the chart those procedures where I was not present during the key portions.          Radu Faria MD  10/08/24 0997    
surgery resident Dr. Campbell dopplerable radial pulse present.  Palpable radial pulse also present. [MO]   1919 Vascular surgery attending has reviewed the scan, agrees that there is thrombosis of the right radial artery.  Recommended plan is Eliquis 5 mg twice daily for 90 days with 1 month follow-up and return precautions. [MO]      ED Course User Index  [MO] Ragini Allen MD       CONSULTS:  IP CONSULT TO VASCULAR SURGERY    CRITICAL CARE:  There was significant risk of life threatening deterioration of patient's condition requiring my direct management. Critical care time 0 minutes, excluding any documented procedures.    FINAL IMPRESSION      1. Thrombosis of right radial artery (HCC)          DISPOSITION / PLAN     DISPOSITION Decision To Discharge 10/08/2024 06:59:52 PM  Condition at Disposition: Data Unavailable      PATIENT REFERRED TO:  Delos Reyes, Arthur, MD  [address] [address] [address]  [place] [address]  [phone]    Schedule an appointment as soon as possible for a visit in 1 month  Please call to schedule appointment in 1 month      DISCHARGE MEDICATIONS:  New Prescriptions    APIXABAN (ELIQUIS) 5 MG TABS TABLET    Take 1 tablet by mouth 2 times daily       Ragini Allen MD  Emergency Medicine Resident    (Please note that portions of thisnote were completed with a voice recognition program.  Efforts were made to edit the dictations but occasionally words are mis-transcribed.)

## 2024-10-08 NOTE — DISCHARGE INSTRUCTIONS
You were seen in the ER today after being sent by your cardiologist for concern of lack of blood flow in your radial artery.  We obtained a CT of your right arm which showed that you do have an occlusion because of a clot in your radial artery.  We had the vascular surgery team evaluate you.  They will start you on a medication called Eliquis which is a blood thinning medication.  This will prevent the clot from getting bigger or spreading, and your body over time will resolve the clot on its own.  In the meantime, you do have a good ulnar artery blood flow which is providing perfusion to your hand.  If at any time that your hand becomes cold, white, if you lose any sensation in your hand, or if you have worsening pain, or any other concerns you can return to the emergency department.  The vascular surgery team wants you to follow-up with them in their office in 1 month.  I will put their office information below so that you can give them a call.  You can  your Eliquis at the pharmacy specified below, which you should take 5 mg twice a day for 90 days.

## 2024-10-09 LAB
EKG ATRIAL RATE: 70 BPM
EKG P AXIS: 93 DEGREES
EKG P-R INTERVAL: 186 MS
EKG Q-T INTERVAL: 406 MS
EKG QRS DURATION: 84 MS
EKG QTC CALCULATION (BAZETT): 438 MS
EKG R AXIS: 3 DEGREES
EKG T AXIS: 74 DEGREES
EKG VENTRICULAR RATE: 70 BPM

## 2024-10-09 PROCEDURE — 93010 ELECTROCARDIOGRAM REPORT: CPT | Performed by: INTERNAL MEDICINE

## 2024-10-29 ENCOUNTER — INITIAL CONSULT (OUTPATIENT)
Dept: PAIN MANAGEMENT | Age: 54
End: 2024-10-29
Payer: COMMERCIAL

## 2024-10-29 VITALS — BODY MASS INDEX: 26.68 KG/M2 | HEIGHT: 62 IN | WEIGHT: 145 LBS

## 2024-10-29 DIAGNOSIS — Z79.01 CHRONIC ANTICOAGULATION: ICD-10-CM

## 2024-10-29 DIAGNOSIS — M54.16 CHRONIC LUMBAR RADICULOPATHY: ICD-10-CM

## 2024-10-29 DIAGNOSIS — M48.061 LUMBAR FORAMINAL STENOSIS: ICD-10-CM

## 2024-10-29 DIAGNOSIS — M96.1 LUMBAR POSTLAMINECTOMY SYNDROME: Primary | ICD-10-CM

## 2024-10-29 PROCEDURE — 99205 OFFICE O/P NEW HI 60 MIN: CPT | Performed by: ANESTHESIOLOGY

## 2024-10-29 ASSESSMENT — ENCOUNTER SYMPTOMS
GASTROINTESTINAL NEGATIVE: 1
EYE PAIN: 0
BACK PAIN: 1
EYES NEGATIVE: 1
DIARRHEA: 0
ALLERGIC/IMMUNOLOGIC NEGATIVE: 1
CHEST TIGHTNESS: 0
NAUSEA: 0
VOMITING: 0
RESPIRATORY NEGATIVE: 1
SORE THROAT: 0
CONSTIPATION: 0
SHORTNESS OF BREATH: 0

## 2024-10-29 NOTE — PROGRESS NOTES
The patient is a 54 y.o.Non- / non  female.    Chief Complaint   Patient presents with    Back Pain    Consultation        Pain History  This is a 54-year-old female with history of chronic neck and low back pain  Reports 2 previous cervical spine surgeries and 1 previous lumbar spine surgery  Neurosurgical note mentions the lower back surgery as L3-L4 laminectomy and discectomy left-sided    Patient states that she had chronic lower back pain issues for which she tried conservative measures and ended up having a back surgery about 2 years ago in [facility]  She states she did not do well after surgery and her pain increased  She then started following up at [facility] for pain and received a left-sided lumbar epidural injection for left-sided sciatica that provided her excellent relief with pain relief lasting for almost a year and improved range of motion  Later that the symptoms returned on right side and she was treated with a pyriformis injection which did not provide her any relief  Following the failed piriformis injection she had a right-sided injection that based on description appears to be an epidural injection that also did not provide any relief    She have done extensive physical therapy multiple times most recently this year  She had a recent neurosurgical evaluation at [facility] for a second opinion    Patient had a recent MRI lumbar spine, report was reviewed images were reviewed independently findings were discussed in detail with patient    Today patient identified location of pain in the lower lumbar area across midline on both side that extends over hip and down both legs over anterior thigh to the knee  There is nondermatomal extension of pain  No associated dermatomal numbness or paresthesia or dermatomal pain at this time    Pain aggravates with excessive activity    Patient is on chronic anticoagulation with Eliquis and states that she had a recent cardiac

## 2024-11-06 ENCOUNTER — INITIAL CONSULT (OUTPATIENT)
Age: 54
End: 2024-11-06
Payer: COMMERCIAL

## 2024-11-06 VITALS
OXYGEN SATURATION: 98 % | BODY MASS INDEX: 26.68 KG/M2 | DIASTOLIC BLOOD PRESSURE: 76 MMHG | RESPIRATION RATE: 18 BRPM | SYSTOLIC BLOOD PRESSURE: 128 MMHG | HEIGHT: 62 IN | HEART RATE: 78 BPM | WEIGHT: 145 LBS

## 2024-11-06 DIAGNOSIS — I74.2: Primary | ICD-10-CM

## 2024-11-06 PROCEDURE — 99213 OFFICE O/P EST LOW 20 MIN: CPT | Performed by: SURGERY

## 2024-11-06 NOTE — PROGRESS NOTES
[facility] VASCULAR SURGERY CLINIC  [facility]  [address] [address] [phone]    Lenora Carbajal  1970  54 y.o.female       Chief Complaint:  Chief Complaint   Patient presents with    Consultation     Thrombosis of right radial artery 10/8/2024           History of present Illness:  This is a 54 y.o.female who I am seeing as a follow-up from right radial artery thrombosis.  This is a 54-year-old female who had a cardiac catheterization which was found to be normal.  Postoperatively she had wrist pain and noted to have a short segment of the radial artery which was thrombosed.  She was started on Eliquis and my recommendation was for 3 months of anticoagulation.  Most of her pain is disappeared.  She had some tenderness where a small hematoma was impinging on the nerve at her wrist.  I can hear Doppler signal throughout the previously thrombosed area and I think the anticoagulation is working.    Past Medical History:  has a past medical history of Anomalous coronary artery origin, Anxiety, Asthma, Back pain, Cirrhosis (HCC), DDD (degenerative disc disease), cervical, DDD (degenerative disc disease), lumbar, Diabetes mellitus (HCC), Fatty liver, History of blood transfusion, Mitral prolapse, Pneumonia, Prolonged emergence from general anesthesia, Thoracic outlet syndrome, and Thyroid goiter.    Past Surgical History:   Past Surgical History:   Procedure Laterality Date    BACK SURGERY      BRONCHOSCOPY      CARDIAC PROCEDURE N/A 10/4/2024    mani darnell / Left heart cath / coronary angiography performed by Mariusz Darnell MD at [facility] CARDIAC CATH LAB    CARDIAC SURGERY      x 1    CERVICAL SPINE SURGERY      plates placed     SECTION      x 2    CHG US GUIDANCE NEEDLE PLACEMENT IMG S&I      COLONOSCOPY      ENDOSCOPY, COLON, DIAGNOSTIC      EGD    FINGER TRIGGER RELEASE Right 10/09/2019    THUMB TRIGGER RELEASE performed by Julio Perez MD at

## 2024-11-12 RX ORDER — FLUTICASONE PROPIONATE 50 MCG
SPRAY, SUSPENSION (ML) NASAL
Qty: 48 G | Refills: 1 | Status: SHIPPED | OUTPATIENT
Start: 2024-11-12

## 2024-12-22 NOTE — OP NOTE
Operative Note      Patient: Acacia Moran  YOB: 1970  MRN: [id number]    Date of Procedure: 12/5/2022    Pre-Op Diagnosis: Acute medial meniscus tear of right knee, initial encounter [S83.241A]    Post-Op Diagnosis: Same       Procedure(s):  RIGHT KNEE ARTHROSCOPY PARTIAL MEDIAL MENISCECTOMY    Surgeon(s):  Avel Holland MD    Assistant:   Resident: Robina Yusuf DO    Anesthesia: General    Estimated Blood Loss (mL): Minimal    Complications: None    Specimens:   * No specimens in log *    Implants:  * No implants in log *      Drains: * No LDAs found *    Findings: Tear of the posterior horn near the root ligament of the medial meniscus right knee    Detailed Description of Procedure:       Patient is a 47y.o. year old female who presents with a history of pain, locking, and giving away sensations of their right knee. Physical examination was positive for Diego's examination. MRI was consistent with a tear extending from the posterior horn into the root ligament of the medial meniscus of the right knee. Having failed conservative treatment, it was advised that arthroscopic examination and treatment of their knee would be beneficial and consent was obtained with risk and benefits explained to the patient. The patient was taken tot he operative room and general anesthesia was administered. A tourniquet was placed to the operatives lower extremity and then placed in the leg lovett. The leg was exsanguinated and the tourniquet inflated to the 300mmHg. The leg was the prepped and draped in the usual sterile fashion. Time-out was called to verify laterality. Medial and lateral portals were made and the scope placed in the lateral portal. The patella femoral joint was examined and noted relatively unremarkable. The scope was then passes down the medial gutter into the medial compartment.  A probe was used to assess the medial meniscus and a tear was identified in the posterior horn and into the Patient classified as COVID treatment eligible by Epic high risk algorithm:  No    Coronavirus (COVID-19) Notification    Reason for call  Notify of POSITIVE COVID-19 lab result, assess symptoms,  review [facility] recommendations    Lab Result   Lab test for 2019-nCoV rRt-PCR or SARS-COV-2 PCR  Oropharyngeal AND/OR nasopharyngeal swabs were POSITIVE for 2019-nCoV RNA [OR] SARS-COV-2 RNA (COVID-19) RNA     We have been unable to reach patient by phone at this time to notify of their Positive COVID-19 result.    Left voicemail message requesting a call back to [phone] [facility] for results.        A Positive COVID-19 letter will be sent via [employer] or the mail.    Filiberto Zurita MA     root ligament of the medial meniscus. A partial medial menisectomy was carried ou with basket forceps and then smoothed out with a shaver. Repeat probing of the meniscus found it to be stable. There was no significant cartilage damage or wear. The arthroscope was then passed into the notch of the knee. The ACL was found not to have any significant damage or laxity. The scope was then passed in the lateral compartment. Thorough probing of the lateral meniscus and the articular cartilage did not demonstrate any significant finding that requires surgical intervention    The scope was then passed into the suprapatellar area and the shaver was used to remove any further soft tissue debris. The scope was removed and the knee evacuated of fluid and injected with 20cc of 0.5% ropivacaine. The sterile bulky dressing was applied and the leg then wrapped with a large 6-inch ace bandage from toes to the mid-thigh. The tourniquet was then deflated at less than 30 minutes. The patient was awaken and taken to the PACU in good condition.       Electronically signed by Calos Liao MD on 12/5/2022 at 11:17 AM

## 2024-12-23 ENCOUNTER — HOSPITAL ENCOUNTER (OUTPATIENT)
Dept: ULTRASOUND IMAGING | Facility: CLINIC | Age: 54
Discharge: HOME | End: 2024-12-25
Payer: COMMERCIAL

## 2024-12-23 ENCOUNTER — HOSPITAL ENCOUNTER (OUTPATIENT)
Facility: CLINIC | Age: 54
Discharge: HOME | End: 2024-12-23
Payer: COMMERCIAL

## 2024-12-23 DIAGNOSIS — K74.69 OTHER CIRRHOSIS OF LIVER (HCC): ICD-10-CM

## 2024-12-23 LAB
AFP SERPL-MCNC: 2.7 UG/L
ALBUMIN SERPL-MCNC: 4.5 G/DL (ref 3.5–5.2)
ALBUMIN/GLOB SERPL: 2 {RATIO} (ref 1–2.5)
ALP SERPL-CCNC: 70 U/L (ref 35–104)
ALT SERPL-CCNC: 13 U/L (ref 10–35)
ANION GAP SERPL CALCULATED.3IONS-SCNC: 9 MMOL/L (ref 9–16)
AST SERPL-CCNC: 20 U/L (ref 10–35)
BILIRUB SERPL-MCNC: 0.4 MG/DL (ref 0–1.2)
BUN SERPL-MCNC: 9 MG/DL (ref 6–20)
CALCIUM SERPL-MCNC: 9.5 MG/DL (ref 8.6–10.4)
CHLORIDE SERPL-SCNC: 105 MMOL/L (ref 98–107)
CO2 SERPL-SCNC: 26 MMOL/L (ref 20–31)
CREAT SERPL-MCNC: 0.9 MG/DL (ref 0.6–0.9)
ERYTHROCYTE [DISTWIDTH] IN BLOOD BY AUTOMATED COUNT: 13.1 % (ref 11.8–14.4)
GFR, ESTIMATED: 76 ML/MIN/1.73M2
GLUCOSE SERPL-MCNC: 99 MG/DL (ref 74–99)
HCT VFR BLD AUTO: 40.2 % (ref 36.3–47.1)
HGB BLD-MCNC: 13.1 G/DL (ref 11.9–15.1)
INR PPP: 1
MCH RBC QN AUTO: 28.1 PG (ref 25.2–33.5)
MCHC RBC AUTO-ENTMCNC: 32.6 G/DL (ref 28.4–34.8)
MCV RBC AUTO: 86.1 FL (ref 82.6–102.9)
NRBC BLD-RTO: 0 PER 100 WBC
PLATELET # BLD AUTO: ABNORMAL K/UL (ref 138–453)
PLATELET, FLUORESCENCE: 133 K/UL (ref 138–453)
PLATELETS.RETICULATED NFR BLD AUTO: 5.5 % (ref 1.1–10.3)
POTASSIUM SERPL-SCNC: 4.5 MMOL/L (ref 3.7–5.3)
PROT SERPL-MCNC: 6.8 G/DL (ref 6.6–8.7)
PROTHROMBIN TIME: 10.1 SEC (ref 9.4–12.6)
RBC # BLD AUTO: 4.67 M/UL (ref 3.95–5.11)
SODIUM SERPL-SCNC: 140 MMOL/L (ref 136–145)
WBC OTHER # BLD: 5 K/UL (ref 3.5–11.3)

## 2024-12-23 PROCEDURE — 85055 RETICULATED PLATELET ASSAY: CPT

## 2024-12-23 PROCEDURE — 36415 COLL VENOUS BLD VENIPUNCTURE: CPT

## 2024-12-23 PROCEDURE — 82105 ALPHA-FETOPROTEIN SERUM: CPT

## 2024-12-23 PROCEDURE — 80053 COMPREHEN METABOLIC PANEL: CPT

## 2024-12-23 PROCEDURE — 76705 ECHO EXAM OF ABDOMEN: CPT

## 2024-12-23 PROCEDURE — 85027 COMPLETE CBC AUTOMATED: CPT

## 2024-12-23 PROCEDURE — 85610 PROTHROMBIN TIME: CPT

## 2025-01-16 ENCOUNTER — TELEPHONE (OUTPATIENT)
Dept: NEUROSURGERY | Age: 55
End: 2025-01-16

## 2025-01-27 RX ORDER — ERGOCALCIFEROL 1.25 MG/1
50000 CAPSULE, LIQUID FILLED ORAL WEEKLY
Qty: 12 CAPSULE | Refills: 2 | Status: SHIPPED | OUTPATIENT
Start: 2025-01-27

## 2025-01-27 NOTE — TELEPHONE ENCOUNTER
Lenora Carbajal is calling to request a refill on the following medication(s):    Last Visit Date (If Applicable):  7/17/2024    Next Visit Date:    Visit date not found    Medication Request:  Requested Prescriptions     Pending Prescriptions Disp Refills    vitamin D (ERGOCALCIFEROL) 1.25 MG (00622 UT) CAPS capsule [Pharmacy Med Name: VITAMIN D2 1.25MG(50,000 UNIT)] 12 capsule 2     Sig: TAKE 1 CAPSULE BY MOUTH ONE TIME PER WEEK

## 2025-01-31 ENCOUNTER — APPOINTMENT (OUTPATIENT)
Dept: CT IMAGING | Facility: CLINIC | Age: 55
End: 2025-01-31
Payer: COMMERCIAL

## 2025-01-31 ENCOUNTER — HOSPITAL ENCOUNTER (EMERGENCY)
Facility: CLINIC | Age: 55
Discharge: HOME | End: 2025-01-31
Attending: STUDENT IN AN ORGANIZED HEALTH CARE EDUCATION/TRAINING PROGRAM
Payer: COMMERCIAL

## 2025-01-31 VITALS
TEMPERATURE: 98.2 F | HEART RATE: 75 BPM | HEIGHT: 63 IN | SYSTOLIC BLOOD PRESSURE: 152 MMHG | OXYGEN SATURATION: 96 % | DIASTOLIC BLOOD PRESSURE: 78 MMHG | BODY MASS INDEX: 24.63 KG/M2 | RESPIRATION RATE: 17 BRPM | WEIGHT: 139 LBS

## 2025-01-31 DIAGNOSIS — R10.9 FLANK PAIN: Primary | ICD-10-CM

## 2025-01-31 LAB
ALBUMIN SERPL-MCNC: 4.5 G/DL (ref 3.5–5.2)
ALBUMIN/GLOB SERPL: 1.6 {RATIO}
ALP SERPL-CCNC: 75 U/L (ref 35–104)
ALT SERPL-CCNC: 12 U/L (ref 10–35)
ANION GAP SERPL CALCULATED.3IONS-SCNC: 10 MMOL/L (ref 9–16)
AST SERPL-CCNC: 18 U/L (ref 10–35)
BACTERIA URNS QL MICRO: ABNORMAL
BASOPHILS # BLD: 0.1 K/UL (ref 0–0.2)
BASOPHILS NFR BLD: 1 % (ref 0–2)
BILIRUB SERPL-MCNC: 0.3 MG/DL (ref 0–1.2)
BILIRUB UR QL STRIP: NEGATIVE
BUN SERPL-MCNC: 12 MG/DL (ref 6–20)
CALCIUM SERPL-MCNC: 9.8 MG/DL (ref 8.6–10.4)
CHARACTER UR: ABNORMAL
CHLORIDE SERPL-SCNC: 103 MMOL/L (ref 98–107)
CLARITY UR: CLEAR
CO2 SERPL-SCNC: 26 MMOL/L (ref 20–31)
COLOR UR: YELLOW
CREAT SERPL-MCNC: 0.9 MG/DL (ref 0.5–0.9)
EOSINOPHIL # BLD: 0.4 K/UL (ref 0–0.4)
EOSINOPHILS RELATIVE PERCENT: 5 % (ref 1–4)
EPI CELLS #/AREA URNS HPF: ABNORMAL /HPF (ref 0–5)
ERYTHROCYTE [DISTWIDTH] IN BLOOD BY AUTOMATED COUNT: 13.4 % (ref 12.5–15.4)
GFR, ESTIMATED: 76 ML/MIN/1.73M2
GLUCOSE SERPL-MCNC: 112 MG/DL (ref 74–99)
GLUCOSE UR STRIP-MCNC: NEGATIVE MG/DL
HCT VFR BLD AUTO: 38.2 % (ref 36–46)
HGB BLD-MCNC: 13.3 G/DL (ref 12–16)
HGB UR QL STRIP.AUTO: NEGATIVE
KETONES UR STRIP-MCNC: NEGATIVE MG/DL
LEUKOCYTE ESTERASE UR QL STRIP: ABNORMAL
LIPASE SERPL-CCNC: 123 U/L (ref 13–60)
LYMPHOCYTES NFR BLD: 2.2 K/UL (ref 1–4.8)
LYMPHOCYTES RELATIVE PERCENT: 28 % (ref 24–44)
MCH RBC QN AUTO: 28.6 PG (ref 26–34)
MCHC RBC AUTO-ENTMCNC: 34.7 G/DL (ref 31–37)
MCV RBC AUTO: 82.4 FL (ref 80–100)
MONOCYTES NFR BLD: 0.4 K/UL (ref 0.1–1.2)
MONOCYTES NFR BLD: 5 % (ref 2–11)
NEUTROPHILS NFR BLD: 61 % (ref 36–66)
NEUTS SEG NFR BLD: 4.8 K/UL (ref 1.8–7.7)
NITRITE UR QL STRIP: NEGATIVE
PH UR STRIP: 6 [PH] (ref 5–8)
PLATELET # BLD AUTO: 168 K/UL (ref 140–450)
PMV BLD AUTO: 8.2 FL (ref 6–12)
POTASSIUM SERPL-SCNC: 3.8 MMOL/L (ref 3.7–5.3)
PROT SERPL-MCNC: 7.3 G/DL (ref 6.6–8.7)
PROT UR STRIP-MCNC: NEGATIVE MG/DL
RBC # BLD AUTO: 4.64 M/UL (ref 4–5.2)
RBC #/AREA URNS HPF: ABNORMAL /HPF (ref 0–2)
SODIUM SERPL-SCNC: 139 MMOL/L (ref 136–145)
SP GR UR STRIP: 1.01 (ref 1–1.03)
TROPONIN I SERPL HS-MCNC: <6 NG/L (ref 0–14)
UROBILINOGEN UR STRIP-ACNC: NORMAL EU/DL (ref 0–1)
WBC #/AREA URNS HPF: ABNORMAL /HPF (ref 0–5)
WBC OTHER # BLD: 7.8 K/UL (ref 3.5–11)

## 2025-01-31 PROCEDURE — 83690 ASSAY OF LIPASE: CPT

## 2025-01-31 PROCEDURE — 6360000004 HC RX CONTRAST MEDICATION: Performed by: STUDENT IN AN ORGANIZED HEALTH CARE EDUCATION/TRAINING PROGRAM

## 2025-01-31 PROCEDURE — 96374 THER/PROPH/DIAG INJ IV PUSH: CPT

## 2025-01-31 PROCEDURE — 71260 CT THORAX DX C+: CPT

## 2025-01-31 PROCEDURE — 6360000002 HC RX W HCPCS: Performed by: STUDENT IN AN ORGANIZED HEALTH CARE EDUCATION/TRAINING PROGRAM

## 2025-01-31 PROCEDURE — 2500000003 HC RX 250 WO HCPCS: Performed by: STUDENT IN AN ORGANIZED HEALTH CARE EDUCATION/TRAINING PROGRAM

## 2025-01-31 PROCEDURE — 85025 COMPLETE CBC W/AUTO DIFF WBC: CPT

## 2025-01-31 PROCEDURE — 36415 COLL VENOUS BLD VENIPUNCTURE: CPT

## 2025-01-31 PROCEDURE — 80053 COMPREHEN METABOLIC PANEL: CPT

## 2025-01-31 PROCEDURE — 84484 ASSAY OF TROPONIN QUANT: CPT

## 2025-01-31 PROCEDURE — 93005 ELECTROCARDIOGRAM TRACING: CPT | Performed by: STUDENT IN AN ORGANIZED HEALTH CARE EDUCATION/TRAINING PROGRAM

## 2025-01-31 PROCEDURE — 99285 EMERGENCY DEPT VISIT HI MDM: CPT

## 2025-01-31 PROCEDURE — 81001 URINALYSIS AUTO W/SCOPE: CPT

## 2025-01-31 PROCEDURE — 74177 CT ABD & PELVIS W/CONTRAST: CPT

## 2025-01-31 RX ORDER — SODIUM CHLORIDE 0.9 % (FLUSH) 0.9 %
10 SYRINGE (ML) INJECTION PRN
Status: DISCONTINUED | OUTPATIENT
Start: 2025-01-31 | End: 2025-01-31 | Stop reason: HOSPADM

## 2025-01-31 RX ORDER — KETOROLAC TROMETHAMINE 30 MG/ML
30 INJECTION, SOLUTION INTRAMUSCULAR; INTRAVENOUS ONCE
Status: COMPLETED | OUTPATIENT
Start: 2025-01-31 | End: 2025-01-31

## 2025-01-31 RX ORDER — 0.9 % SODIUM CHLORIDE 0.9 %
70 INTRAVENOUS SOLUTION INTRAVENOUS ONCE
Status: DISCONTINUED | OUTPATIENT
Start: 2025-01-31 | End: 2025-01-31 | Stop reason: HOSPADM

## 2025-01-31 RX ORDER — IOPAMIDOL 755 MG/ML
75 INJECTION, SOLUTION INTRAVASCULAR
Status: COMPLETED | OUTPATIENT
Start: 2025-01-31 | End: 2025-01-31

## 2025-01-31 RX ADMIN — KETOROLAC TROMETHAMINE 30 MG: 30 INJECTION, SOLUTION INTRAMUSCULAR at 15:31

## 2025-01-31 RX ADMIN — Medication 70 ML: at 15:52

## 2025-01-31 RX ADMIN — IOPAMIDOL 75 ML: 755 INJECTION, SOLUTION INTRAVENOUS at 15:51

## 2025-01-31 RX ADMIN — SODIUM CHLORIDE, PRESERVATIVE FREE 10 ML: 5 INJECTION INTRAVENOUS at 15:51

## 2025-01-31 ASSESSMENT — PAIN SCALES - GENERAL: PAINLEVEL_OUTOF10: 3

## 2025-01-31 NOTE — DISCHARGE INSTRUCTIONS
Take your medication as indicated and prescribed.  For pain use acetaminophen (Tylenol) or ibuprofen (Motrin / Advil), unless prescribed medications that have acetaminophen or ibuprofen (or similar medications) in it.  You can take over the counter acetaminophen tablets (1 - 2 tablets of the 500-mg strength every 6 hours) or ibuprofen tablets (2 tablets every 4 hours).    Use an ice pack or bag filled with ice and apply to the injured area 3 - 4 times a day for 15 - 20 minutes each time.  If the injury is older than 3 days, then use a heating pad to help relax the muscles.    PLEASE RETURN TO THE EMERGENCY DEPARTMENT IMMEDIATELY for worsening of pain, decrease sensation to arms or legs, inability to move arms or legs, shortness of breath, severe chest pain, excessive nausea or vomiting, notice any bruising to your abdomen or have increase in abdominal pain, or if you develop any concerning symptoms such as: high fever not relieved by acetaminophen (Tylenol) and/or ibuprofen (Motrin / Advil), chills, feeling of your heart fluttering or racing, persistent nausea and/or vomiting, vomiting up blood, blood in your stool, loss of consciousness, numbness, weakness or tingling in the arms or legs or change in color of the extremities, changes in mental status, persistent headache, blurry vision, loss of bladder / bowel control, unable to follow up with your physician, or other any other care or concern.

## 2025-02-01 NOTE — ED PROVIDER NOTES
[facility]  [address] [address]  Phone: [phone]  EMERGENCY DEPARTMENT ENCOUNTER      Pt Name: Lenora Carbajal  MRN: [id number]  Birthdate 1970  Date of evaluation: 1/31/2025    CHIEF COMPLAINT       Chief Complaint   Patient presents with    Shortness of Breath     1 week    Rib Pain (injury)     Right        HISTORY OF PRESENT ILLNESS    Lenora Carbajal is a 54 y.o. female who presents to the emergency department due to shortness of breath worse with exertion and certain positions ongoing for 1 week.  She also reports right-sided chest pain that does radiate up into her epigastric area.  She reports the symptoms have been ongoing for about a week.  Have not been improving.  She has been taking Motrin at home to help with the symptoms.  She also reports intermittent nausea, no vomiting.  States that the pain does radiate into her abdomen.  She also reports cough, no fevers or chills.  Has a history of anomalous coronary artery origin, anxiety, degenerative disc disease, diabetes, fatty liver, cirrhosis, mitral prolapse, thoracic outlet syndrome.  Patient does report a history of blood clots, states that she has had them in her arms and recently was taken off her anticoagulation.  She also reports that she has a hypercoagulable trait as well.  She also reports that she had dysuria and hematuria earlier in the week, did not take antibiotics and just took Azo and her symptoms resolved.      REVIEW OF SYSTEMS     Positive: Abdominal pain, nausea, chest pain, back pain, shortness of breath  Negative: Fevers, chills, vomiting, weakness, numbness, headache    PAST MEDICAL HISTORY    has a past medical history of Anomalous coronary artery origin, Anxiety, Asthma, Back pain, Cirrhosis (HCC), DDD (degenerative disc disease), cervical, DDD (degenerative disc disease), lumbar, Diabetes mellitus (HCC), Fatty liver, History of blood transfusion, Mitral prolapse, Pneumonia, Prolonged

## 2025-02-02 LAB
EKG ATRIAL RATE: 79 BPM
EKG P AXIS: 49 DEGREES
EKG P-R INTERVAL: 188 MS
EKG Q-T INTERVAL: 398 MS
EKG QRS DURATION: 78 MS
EKG QTC CALCULATION (BAZETT): 456 MS
EKG R AXIS: -11 DEGREES
EKG T AXIS: 53 DEGREES
EKG VENTRICULAR RATE: 79 BPM

## 2025-03-24 ENCOUNTER — HOSPITAL ENCOUNTER (OUTPATIENT)
Age: 55
Setting detail: SPECIMEN
Discharge: HOME | End: 2025-03-24

## 2025-03-24 ENCOUNTER — OFFICE VISIT (OUTPATIENT)
Dept: FAMILY MEDICINE CLINIC | Age: 55
End: 2025-03-24
Payer: COMMERCIAL

## 2025-03-24 VITALS
TEMPERATURE: 97 F | OXYGEN SATURATION: 97 % | WEIGHT: 139 LBS | DIASTOLIC BLOOD PRESSURE: 82 MMHG | SYSTOLIC BLOOD PRESSURE: 137 MMHG | HEART RATE: 80 BPM | BODY MASS INDEX: 24.62 KG/M2

## 2025-03-24 DIAGNOSIS — E11.9 TYPE 2 DIABETES MELLITUS WITHOUT COMPLICATION, WITHOUT LONG-TERM CURRENT USE OF INSULIN: Primary | ICD-10-CM

## 2025-03-24 DIAGNOSIS — J32.8 OTHER CHRONIC SINUSITIS: ICD-10-CM

## 2025-03-24 DIAGNOSIS — E11.9 TYPE 2 DIABETES MELLITUS WITHOUT COMPLICATION, WITHOUT LONG-TERM CURRENT USE OF INSULIN: ICD-10-CM

## 2025-03-24 DIAGNOSIS — Z23 NEED FOR SHINGLES VACCINE: ICD-10-CM

## 2025-03-24 DIAGNOSIS — K74.69 FLORID CIRRHOSIS (HCC): ICD-10-CM

## 2025-03-24 LAB
CREAT UR-MCNC: 223 MG/DL (ref 28–217)
HBA1C MFR BLD: 6.1 %
MICROALBUMIN UR-MCNC: 15 MG/L (ref 0–20)
MICROALBUMIN/CREAT UR-RTO: 7 MCG/MG CREAT (ref 0–25)

## 2025-03-24 PROCEDURE — 3044F HG A1C LEVEL LT 7.0%: CPT | Performed by: FAMILY MEDICINE

## 2025-03-24 PROCEDURE — 99213 OFFICE O/P EST LOW 20 MIN: CPT | Performed by: FAMILY MEDICINE

## 2025-03-24 PROCEDURE — 83036 HEMOGLOBIN GLYCOSYLATED A1C: CPT | Performed by: FAMILY MEDICINE

## 2025-03-24 RX ORDER — ZOSTER VACCINE RECOMBINANT, ADJUVANTED 50 MCG/0.5
0.5 KIT INTRAMUSCULAR SEE ADMIN INSTRUCTIONS
Qty: 0.5 ML | Refills: 1 | Status: SHIPPED | OUTPATIENT
Start: 2025-03-24 | End: 2025-09-20

## 2025-03-24 SDOH — ECONOMIC STABILITY: FOOD INSECURITY: WITHIN THE PAST 12 MONTHS, THE FOOD YOU BOUGHT JUST DIDN'T LAST AND YOU DIDN'T HAVE MONEY TO GET MORE.: NEVER TRUE

## 2025-03-24 SDOH — ECONOMIC STABILITY: FOOD INSECURITY: WITHIN THE PAST 12 MONTHS, YOU WORRIED THAT YOUR FOOD WOULD RUN OUT BEFORE YOU GOT MONEY TO BUY MORE.: NEVER TRUE

## 2025-03-24 ASSESSMENT — PATIENT HEALTH QUESTIONNAIRE - PHQ9
2. FEELING DOWN, DEPRESSED OR HOPELESS: NOT AT ALL
SUM OF ALL RESPONSES TO PHQ QUESTIONS 1-9: 0
1. LITTLE INTEREST OR PLEASURE IN DOING THINGS: NOT AT ALL

## 2025-03-24 NOTE — PROGRESS NOTES
General FM note    Lenora Carbajal is a 54 y.o. female who presents today for follow up on her  medical conditions as noted below.  Lenora Carbajal is c/o of   Chief Complaint   Patient presents with    Neck Pain     fmla    Back Pain       Patient Active Problem List:     Fatigue     GERD (gastroesophageal reflux disease)     Obesity (BMI 30-39.9)     Diabetes mellitus (HCC)     Snoring     Elevated liver enzymes     Unstable angina (HCC)     Abnormal stress test     Anomalous coronary artery origin     Trigger thumb of right hand     Acute medial meniscus tear of right knee     DDD (degenerative disc disease), lumbar     Herniated cervical disc     Lumbar stenosis with neurogenic claudication     Thoracic outlet syndrome     Sleep apnea     Cirrhosis of liver without ascites (HCC)     History of laminectomy     Foraminal stenosis due to intervertebral disc disease     Lumbar spondylosis     SI (sacroiliac) joint dysfunction     Thrombosis of right radial artery (HCC)     Lumbar postlaminectomy syndrome     Lumbar foraminal stenosis     Chronic lumbar radiculopathy     Chronic anticoagulation     Past Medical History:   Diagnosis Date    Anomalous coronary artery origin 2016    Anxiety     Asthma     Back pain     Cirrhosis (HCC)     DDD (degenerative disc disease), cervical     DDD (degenerative disc disease), lumbar     Diabetes mellitus (HCC)     Fatty liver     History of blood transfusion     Mitral prolapse     Pneumonia     Prolonged emergence from general anesthesia     Thoracic outlet syndrome     Thyroid goiter     had biopsy      Past Surgical History:   Procedure Laterality Date    BACK SURGERY      BRONCHOSCOPY      CARDIAC PROCEDURE N/A 10/4/2024    mani darnell / Left heart cath / coronary angiography performed by Mariusz Darnell MD at [facility] CARDIAC CATH LAB    CARDIAC SURGERY      x 1    CERVICAL SPINE SURGERY      plates placed     SECTION      x 2    CHG US GUIDANCE NEEDLE PLACEMENT IMG

## 2025-03-25 ENCOUNTER — RESULTS FOLLOW-UP (OUTPATIENT)
Dept: FAMILY MEDICINE CLINIC | Age: 55
End: 2025-03-25

## 2025-05-08 ENCOUNTER — TRANSCRIBE ORDERS (OUTPATIENT)
Dept: ADMINISTRATIVE | Age: 55
End: 2025-05-08

## 2025-05-08 DIAGNOSIS — K74.60 HEPATIC CIRRHOSIS, UNSPECIFIED HEPATIC CIRRHOSIS TYPE, UNSPECIFIED WHETHER ASCITES PRESENT (HCC): Primary | ICD-10-CM

## 2025-05-20 ENCOUNTER — HOSPITAL ENCOUNTER (OUTPATIENT)
Dept: ULTRASOUND IMAGING | Facility: CLINIC | Age: 55
Discharge: HOME | End: 2025-05-22
Payer: COMMERCIAL

## 2025-05-20 ENCOUNTER — HOSPITAL ENCOUNTER (OUTPATIENT)
Facility: CLINIC | Age: 55
Discharge: HOME | End: 2025-05-20
Payer: COMMERCIAL

## 2025-05-20 DIAGNOSIS — E78.00 PURE HYPERCHOLESTEROLEMIA: ICD-10-CM

## 2025-05-20 DIAGNOSIS — K74.60 HEPATIC CIRRHOSIS, UNSPECIFIED HEPATIC CIRRHOSIS TYPE, UNSPECIFIED WHETHER ASCITES PRESENT (HCC): ICD-10-CM

## 2025-05-20 LAB
ALBUMIN SERPL-MCNC: 4.2 G/DL (ref 3.5–5.2)
ALBUMIN/GLOB SERPL: 1.8 {RATIO} (ref 1–2.5)
ALP SERPL-CCNC: 67 U/L (ref 35–104)
ALT SERPL-CCNC: 15 U/L (ref 10–35)
ANION GAP SERPL CALCULATED.3IONS-SCNC: 8 MMOL/L (ref 9–16)
AST SERPL-CCNC: 17 U/L (ref 10–35)
BILIRUB SERPL-MCNC: 0.4 MG/DL (ref 0–1.2)
BUN SERPL-MCNC: 11 MG/DL (ref 6–20)
CALCIUM SERPL-MCNC: 9.4 MG/DL (ref 8.6–10.4)
CHLORIDE SERPL-SCNC: 107 MMOL/L (ref 98–107)
CHOLEST SERPL-MCNC: 169 MG/DL (ref 0–199)
CHOLESTEROL/HDL RATIO: 3.3
CO2 SERPL-SCNC: 26 MMOL/L (ref 20–31)
CREAT SERPL-MCNC: 1 MG/DL (ref 0.6–0.9)
GFR, ESTIMATED: 67 ML/MIN/1.73M2
GLUCOSE SERPL-MCNC: 92 MG/DL (ref 74–99)
HDLC SERPL-MCNC: 51 MG/DL
LDLC SERPL CALC-MCNC: 96 MG/DL (ref 0–100)
POTASSIUM SERPL-SCNC: 4.2 MMOL/L (ref 3.7–5.3)
PROT SERPL-MCNC: 6.5 G/DL (ref 6.6–8.7)
SODIUM SERPL-SCNC: 141 MMOL/L (ref 136–145)
TRIGL SERPL-MCNC: 112 MG/DL (ref 0–149)
VLDLC SERPL CALC-MCNC: 22 MG/DL (ref 1–30)

## 2025-05-20 PROCEDURE — 36415 COLL VENOUS BLD VENIPUNCTURE: CPT

## 2025-05-20 PROCEDURE — 80061 LIPID PANEL: CPT

## 2025-05-20 PROCEDURE — 76705 ECHO EXAM OF ABDOMEN: CPT

## 2025-05-20 PROCEDURE — 82172 ASSAY OF APOLIPOPROTEIN: CPT

## 2025-05-20 PROCEDURE — 80053 COMPREHEN METABOLIC PANEL: CPT

## 2025-05-21 ENCOUNTER — OFFICE VISIT (OUTPATIENT)
Age: 55
End: 2025-05-21
Payer: COMMERCIAL

## 2025-05-21 ENCOUNTER — OFFICE VISIT (OUTPATIENT)
Dept: ORTHOPEDIC SURGERY | Age: 55
End: 2025-05-21
Payer: COMMERCIAL

## 2025-05-21 VITALS
OXYGEN SATURATION: 98 % | WEIGHT: 139 LBS | RESPIRATION RATE: 18 BRPM | BODY MASS INDEX: 24.63 KG/M2 | SYSTOLIC BLOOD PRESSURE: 158 MMHG | HEIGHT: 63 IN | DIASTOLIC BLOOD PRESSURE: 103 MMHG

## 2025-05-21 VITALS — BODY MASS INDEX: 24.63 KG/M2 | HEIGHT: 63 IN | WEIGHT: 139 LBS | RESPIRATION RATE: 14 BRPM

## 2025-05-21 DIAGNOSIS — M25.551 BILATERAL HIP PAIN: Primary | ICD-10-CM

## 2025-05-21 DIAGNOSIS — M25.561 RIGHT KNEE PAIN, UNSPECIFIED CHRONICITY: ICD-10-CM

## 2025-05-21 DIAGNOSIS — M25.552 BILATERAL HIP PAIN: Primary | ICD-10-CM

## 2025-05-21 DIAGNOSIS — I74.2 RADIAL ARTERY THROMBOSIS, RIGHT (HCC): Primary | ICD-10-CM

## 2025-05-21 PROCEDURE — 99213 OFFICE O/P EST LOW 20 MIN: CPT | Performed by: SURGERY

## 2025-05-21 PROCEDURE — 99214 OFFICE O/P EST MOD 30 MIN: CPT | Performed by: PHYSICIAN ASSISTANT

## 2025-05-21 PROCEDURE — 20610 DRAIN/INJ JOINT/BURSA W/O US: CPT | Performed by: PHYSICIAN ASSISTANT

## 2025-05-21 RX ORDER — METHYLPREDNISOLONE ACETATE 80 MG/ML
80 INJECTION, SUSPENSION INTRA-ARTICULAR; INTRALESIONAL; INTRAMUSCULAR; SOFT TISSUE ONCE
Status: COMPLETED | OUTPATIENT
Start: 2025-05-21 | End: 2025-05-21

## 2025-05-21 RX ORDER — BUPIVACAINE HYDROCHLORIDE 2.5 MG/ML
2 INJECTION, SOLUTION INFILTRATION; PERINEURAL ONCE
Status: COMPLETED | OUTPATIENT
Start: 2025-05-21 | End: 2025-05-21

## 2025-05-21 RX ADMIN — METHYLPREDNISOLONE ACETATE 80 MG: 80 INJECTION, SUSPENSION INTRA-ARTICULAR; INTRALESIONAL; INTRAMUSCULAR; SOFT TISSUE at 14:11

## 2025-05-21 RX ADMIN — BUPIVACAINE HYDROCHLORIDE 5 MG: 2.5 INJECTION, SOLUTION INFILTRATION; PERINEURAL at 14:11

## 2025-05-21 NOTE — PROGRESS NOTES
[facility] VASCULAR SURGERY CLINIC  [facility]  [address]  [facility] [address]    Lenora Carbajal  1970  55 y.o.female       Chief Complaint:  Chief Complaint   Patient presents with    Follow-up     Thrombosis of right radial artery card  would like it re checked 2/10/25 pt had Vascular US Duplex        History of present Illness:  This is a 55 y.o.female with a history of right radial artery thrombosis after cardiac catheterization.  We kept her on systemic anticoagulation for 2 to 3 months.  Her cardiologist repeated her ultrasound which shows low flows within the distal right radial artery at the cannulation site.  This is pretty normal after cardiac caths and it is likely a higher percentage than we think where radial artery is thrombosed.  She is otherwise asymptomatic has normal function in her hands and forearm and experiences no pain.  No intervention is needed.    Past Medical History:  has a past medical history of Anomalous coronary artery origin, Anxiety, Asthma, Back pain, Cirrhosis (HCC), DDD (degenerative disc disease), cervical, DDD (degenerative disc disease), lumbar, Diabetes mellitus (HCC), Fatty liver, History of blood transfusion, Mitral prolapse, Pneumonia, Prolonged emergence from general anesthesia, Thoracic outlet syndrome, and Thyroid goiter.    Past Surgical History:   Past Surgical History:   Procedure Laterality Date    BACK SURGERY      BRONCHOSCOPY      CARDIAC PROCEDURE N/A 10/4/2024    mani darnell / Left heart cath / coronary angiography performed by Mariusz Darnell MD at [facility] CARDIAC CATH LAB    CARDIAC SURGERY      x 1    CERVICAL SPINE SURGERY      plates placed     SECTION      x 2    CHG US GUIDANCE NEEDLE PLACEMENT IMG S&I      COLONOSCOPY      ENDOSCOPY, COLON, DIAGNOSTIC      EGD    FINGER TRIGGER RELEASE Right 10/09/2019    THUMB TRIGGER RELEASE performed by Julio KRISHNAN

## 2025-05-21 NOTE — PROGRESS NOTES
the possibility of a cortisone injection in her right hip as this was far more painful and at this time she has elected to proceed with as described below      Procedure: right trochanteric bursal celestone injection  An informed verbal consent for the procedure was obtained and risks including, but not limited to: allergy to medications, injection, bleeding, stiffness of joint, recurrence of symptoms, loss of function, swelling, drainage, irrigation, need for surgery and pseudo-septic inflammation, were explained to the patient. Also, discussed was the potential for further injections, irrigation and debridement and surgery. Alternate means of treatment have also been discussed with the patient.            Following an appropriate discussion with the patient regarding the risks and benefits of the procedure she consented to proceed. her right trochanteric bursa was prepped using betadine solution. Using aseptic technique and through a lateral approach, her right trochanteric bursa was injected superficially with 3 cc mixture of 2 cc of 0.25% Marcaine, 1 cc of 80 Mg per mL Depo-Medrol into the bursal space.A band aid was applied to the injection site. she tolerated the injection with no immediate adverse reactions.            Electronically signed by PRISCA Johnson on 5/21/2025 at 7:18 PM    This note is created with the assistance of a speech recognition program.  While intending to generate a document that actually reflects the content of the visit, the document can still have some errors including those of syntax and sound a like substitutions which may escape proof reading.  In such instances, actual meaning can be extrapolated by contextual diversion

## 2025-05-22 LAB — APO B100 SERPL-MCNC: 88 MG/DL (ref 60–117)

## 2025-05-30 ENCOUNTER — HOSPITAL ENCOUNTER (OUTPATIENT)
Facility: CLINIC | Age: 55
Discharge: HOME | End: 2025-05-30
Attending: NEUROLOGICAL SURGERY
Payer: COMMERCIAL

## 2025-05-30 DIAGNOSIS — M48.02 CERVICAL STENOSIS OF SPINAL CANAL: ICD-10-CM

## 2025-05-30 PROCEDURE — 72141 MRI NECK SPINE W/O DYE: CPT

## 2025-06-27 ENCOUNTER — TRANSCRIBE ORDERS (OUTPATIENT)
Dept: GENERAL RADIOLOGY | Facility: CLINIC | Age: 55
End: 2025-06-27

## 2025-06-27 ENCOUNTER — HOSPITAL ENCOUNTER (OUTPATIENT)
Dept: GENERAL RADIOLOGY | Facility: CLINIC | Age: 55
Discharge: HOME | End: 2025-06-29
Payer: COMMERCIAL

## 2025-06-27 DIAGNOSIS — J44.9 COPD (CHRONIC OBSTRUCTIVE PULMONARY DISEASE) CASE MANAGEMENT PATIENT (HCC): ICD-10-CM

## 2025-06-27 DIAGNOSIS — J44.9 COPD (CHRONIC OBSTRUCTIVE PULMONARY DISEASE) CASE MANAGEMENT PATIENT (HCC): Primary | ICD-10-CM

## 2025-06-27 PROCEDURE — 71046 X-RAY EXAM CHEST 2 VIEWS: CPT

## 2025-07-25 ENCOUNTER — HOSPITAL ENCOUNTER (EMERGENCY)
Facility: CLINIC | Age: 55
Discharge: HOME | End: 2025-07-25
Attending: EMERGENCY MEDICINE
Payer: COMMERCIAL

## 2025-07-25 ENCOUNTER — APPOINTMENT (OUTPATIENT)
Dept: GENERAL RADIOLOGY | Facility: CLINIC | Age: 55
End: 2025-07-25
Payer: COMMERCIAL

## 2025-07-25 VITALS
OXYGEN SATURATION: 98 % | SYSTOLIC BLOOD PRESSURE: 151 MMHG | HEART RATE: 76 BPM | BODY MASS INDEX: 24.8 KG/M2 | RESPIRATION RATE: 20 BRPM | TEMPERATURE: 98.4 F | DIASTOLIC BLOOD PRESSURE: 108 MMHG | WEIGHT: 140 LBS | HEIGHT: 63 IN

## 2025-07-25 DIAGNOSIS — J18.9 PNEUMONIA OF RIGHT LOWER LOBE DUE TO INFECTIOUS ORGANISM: Primary | ICD-10-CM

## 2025-07-25 PROCEDURE — 99283 EMERGENCY DEPT VISIT LOW MDM: CPT

## 2025-07-25 PROCEDURE — 71046 X-RAY EXAM CHEST 2 VIEWS: CPT

## 2025-07-25 RX ORDER — ALBUTEROL SULFATE 0.83 MG/ML
2.5 SOLUTION RESPIRATORY (INHALATION) EVERY 6 HOURS PRN
Qty: 120 EACH | Refills: 0 | Status: SHIPPED | OUTPATIENT
Start: 2025-07-25

## 2025-07-25 ASSESSMENT — PAIN DESCRIPTION - LOCATION: LOCATION: RIB CAGE

## 2025-07-25 ASSESSMENT — PAIN - FUNCTIONAL ASSESSMENT: PAIN_FUNCTIONAL_ASSESSMENT: 0-10

## 2025-07-25 ASSESSMENT — PAIN SCALES - GENERAL: PAINLEVEL_OUTOF10: 10

## 2025-07-25 NOTE — ED PROVIDER NOTES
[facility]  EMERGENCY DEPARTMENT ENCOUNTER      Pt Name: Lenora Carbajal  MRN: [id number]  Birthdate 1970  Date of evaluation: 7/25/2025  Provider: ARIANNA Mcginnis CNP  2:03 PM    CHIEF COMPLAINT       Chief Complaint   Patient presents with    Shortness of Breath     Pain with inspiration         HISTORY OF PRESENT ILLNESS    Lenora Carbajal is a 55 y.o. female who presents to the emergency department with complaint of right posterior and lateral rib pain.  Patient states that the pain hurts when touched, overstretching or taking deep breath/cough.  Patient denies any recent injury, trauma or fall.  Patient denies any recent air travel.  Denies any blood thinner use, patient is a smoker with a history of asthma.  Patient states that she has had this pain in the past has been treated with steroids and she has gotten better.  Patient states the pain feels like a popping sensation.  She states that her primary care doctor prescribed her levofloxacin that she just picked up today but was concerned about the pain.  Patient states this pain has only be for the past 2 days in which 2 days ago she was playing with her grandkids.  Denies noting any specific incident of pain    HPI    Nursing Notes were reviewed.    REVIEW OF SYSTEMS       Review of Systems    Except as noted above the remainder of the review of systems was reviewed and negative.       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 
eMERGENCY dEPARTMENT  Attending Physician Attestation     Pt Name: Lenora Carbajal  MRN: [id number]  Birthdate 1970  Date of evaluation: 7/25/25     Lenora Carbajal is a 55 y.o. female with CC: Shortness of Breath (Pain with inspiration)      I was available to render services if needed but did not directly participate in the care of the patient.    Vitals Reviewed:    Vitals:    07/25/25 1126   BP: (!) 151/108   Pulse: 76   Resp: 20   Temp: 98.4 °F (36.9 °C)   TempSrc: Oral   SpO2: 98%   Weight: 63.5 kg (140 lb)   Height: 1.6 m (5' 3\")       Initial Pain Score: Pain Level: 10 (07/25/25 1126)  Last Pain Score: Pain Level: 10 (07/25/25 1126)    Weston Plummer MD  Attending Emergency Physician                Weston Plummer MD  07/25/25 [id number]    
2

## 2025-07-25 NOTE — DISCHARGE INSTRUCTIONS
Take your levofloxacin as indicated and prescribed.  If you are given an antibiotic then, make sure you get the prescription filled and take the antibiotics until finished.  Drink plenty of water while taking the antibiotics.  Can use Mucinex D as needed for cough suppression as needed.  Avoid drinking alcohol or drinks that have caffeine in it while taking antibiotics.       If you were given a prescription for prednisone or any other steroid then, take Pepcid (famotidine - over the counter) every day while you are taking the steroids.  If you are a diabetic, you should check your blood sugar more frequently while taking prednisone.      For fever or pain use acetaminophen (Tylenol) or ibuprofen (Motrin / Advil), unless prescribed medications that have acetaminophen or ibuprofen (or similar medications) in it.  You can take over the counter acetaminophen tablets (1 - 2 tablets of the 500-mg strength every 6 hours) or ibuprofen tablets (2 tablets every 4 hours).    PLEASE RETURN TO THE EMERGENCY DEPARTMENT IMMEDIATELY for worsening symptoms, shortness of breathing, change in the amount of sputum that you cough up or a change in the color of your sputum, using your inhaler more frequently or if your inhaler only lasts up to 2 hours (if given an inhaler), or if you develop any concerning symptoms such as: high fever not relieved by acetaminophen (Tylenol) and/or ibuprofen (Motrin / Advil), chills, shortness of breath, chest pain, feeling of your heart fluttering or racing, persistent nausea and/or vomiting, vomiting up blood, blood in your stool, loss of consciousness, numbness, weakness or tingling in the arms or legs or change in color of the extremities, changes in mental status, persistent headache, blurry vision, loss of bladder / bowel control, unable to follow up with your physician, or other any other care or concern.

## 2025-07-29 ENCOUNTER — TRANSCRIBE ORDERS (OUTPATIENT)
Dept: ADMINISTRATIVE | Age: 55
End: 2025-07-29

## 2025-07-29 DIAGNOSIS — D64.9 ANEMIA, UNSPECIFIED TYPE: ICD-10-CM

## 2025-07-29 DIAGNOSIS — K74.60 CIRRHOSIS OF LIVER WITHOUT ASCITES, UNSPECIFIED HEPATIC CIRRHOSIS TYPE (HCC): Primary | ICD-10-CM

## 2025-07-29 DIAGNOSIS — J18.9 UNRESOLVED PNEUMONIA: Primary | ICD-10-CM

## 2025-07-30 ENCOUNTER — HOSPITAL ENCOUNTER (OUTPATIENT)
Facility: CLINIC | Age: 55
Setting detail: SPECIMEN
Discharge: HOME | End: 2025-07-30
Payer: COMMERCIAL

## 2025-07-30 ENCOUNTER — HOSPITAL ENCOUNTER (OUTPATIENT)
Dept: LAB | Facility: CLINIC | Age: 55
Discharge: HOME | End: 2025-07-30
Payer: COMMERCIAL

## 2025-07-30 ENCOUNTER — HOSPITAL ENCOUNTER (OUTPATIENT)
Age: 55
Setting detail: SPECIMEN
End: 2025-07-30
Payer: COMMERCIAL

## 2025-07-30 ENCOUNTER — HOSPITAL ENCOUNTER (OUTPATIENT)
Dept: LAB | Age: 55
End: 2025-07-30
Payer: COMMERCIAL

## 2025-07-30 PROBLEM — E11.65 UNCONTROLLED TYPE 2 DIABETES MELLITUS WITH HYPERGLYCEMIA (HCC): Status: ACTIVE | Noted: 2025-07-30

## 2025-07-30 LAB
AFP SERPL-MCNC: 3 UG/L
AFP SERPL-MCNC: 3 UG/L
ALBUMIN SERPL-MCNC: 4.5 G/DL (ref 3.5–5.2)
ALBUMIN SERPL-MCNC: 4.5 G/DL (ref 3.5–5.2)
ALBUMIN/GLOB SERPL: 1.7 {RATIO} (ref 1–2.5)
ALBUMIN/GLOB SERPL: 1.7 {RATIO} (ref 1–2.5)
ALP SERPL-CCNC: 69 U/L (ref 35–104)
ALP SERPL-CCNC: 69 U/L (ref 35–104)
ALT SERPL-CCNC: 15 U/L (ref 10–35)
ALT SERPL-CCNC: 16 U/L (ref 10–35)
ANION GAP SERPL CALCULATED.3IONS-SCNC: 10 MMOL/L (ref 9–16)
ANION GAP SERPL CALCULATED.3IONS-SCNC: 11 MMOL/L (ref 9–16)
AST SERPL-CCNC: 18 U/L (ref 10–35)
AST SERPL-CCNC: 18 U/L (ref 10–35)
BASOPHILS # BLD: 0.04 K/UL (ref 0–0.2)
BASOPHILS NFR BLD: 0 % (ref 0–2)
BILIRUB DIRECT SERPL-MCNC: 0.1 MG/DL (ref 0–0.2)
BILIRUB INDIRECT SERPL-MCNC: 0.2 MG/DL (ref 0–1)
BILIRUB SERPL-MCNC: 0.3 MG/DL (ref 0–1.2)
BILIRUB SERPL-MCNC: 0.3 MG/DL (ref 0–1.2)
BUN SERPL-MCNC: 16 MG/DL (ref 6–20)
BUN SERPL-MCNC: 16 MG/DL (ref 6–20)
CALCIUM SERPL-MCNC: 9.9 MG/DL (ref 8.6–10.4)
CALCIUM SERPL-MCNC: 9.9 MG/DL (ref 8.6–10.4)
CHLORIDE SERPL-SCNC: 104 MMOL/L (ref 98–107)
CHLORIDE SERPL-SCNC: 105 MMOL/L (ref 98–107)
CO2 SERPL-SCNC: 24 MMOL/L (ref 20–31)
CO2 SERPL-SCNC: 25 MMOL/L (ref 20–31)
CREAT SERPL-MCNC: 1 MG/DL (ref 0.6–0.9)
CREAT SERPL-MCNC: 1 MG/DL (ref 0.6–0.9)
EOSINOPHIL # BLD: 0.03 K/UL (ref 0–0.44)
EOSINOPHILS RELATIVE PERCENT: 0 % (ref 1–4)
ERYTHROCYTE [DISTWIDTH] IN BLOOD BY AUTOMATED COUNT: 13.2 % (ref 11.8–14.4)
ERYTHROCYTE [DISTWIDTH] IN BLOOD BY AUTOMATED COUNT: 13.2 % (ref 11.8–14.4)
GFR, ESTIMATED: 67 ML/MIN/1.73M2
GFR, ESTIMATED: 67 ML/MIN/1.73M2
GLOBULIN SER CALC-MCNC: 2.7 G/DL
GLUCOSE SERPL-MCNC: 142 MG/DL (ref 74–99)
GLUCOSE SERPL-MCNC: 143 MG/DL (ref 74–99)
HCT VFR BLD AUTO: 39.6 % (ref 36.3–47.1)
HCT VFR BLD AUTO: 40.1 % (ref 36.3–47.1)
HGB BLD-MCNC: 13.1 G/DL (ref 11.9–15.1)
HGB BLD-MCNC: 13.6 G/DL (ref 11.9–15.1)
IMM GRANULOCYTES # BLD AUTO: 0.1 K/UL (ref 0–0.3)
IMM GRANULOCYTES NFR BLD: 1 %
INR PPP: 1
INR PPP: 1
LYMPHOCYTES NFR BLD: 1.14 K/UL (ref 1.1–3.7)
LYMPHOCYTES RELATIVE PERCENT: 11 % (ref 24–43)
MCH RBC QN AUTO: 28 PG (ref 25.2–33.5)
MCH RBC QN AUTO: 29.4 PG (ref 25.2–33.5)
MCHC RBC AUTO-ENTMCNC: 32.7 G/DL (ref 28.4–34.8)
MCHC RBC AUTO-ENTMCNC: 34.3 G/DL (ref 28.4–34.8)
MCV RBC AUTO: 85.7 FL (ref 82.6–102.9)
MCV RBC AUTO: 85.7 FL (ref 82.6–102.9)
MONOCYTES NFR BLD: 0.18 K/UL (ref 0.1–1.2)
MONOCYTES NFR BLD: 2 % (ref 3–12)
NEUTROPHILS NFR BLD: 86 % (ref 36–65)
NEUTS SEG NFR BLD: 8.56 K/UL (ref 1.5–8.1)
NRBC BLD-RTO: 0 PER 100 WBC
NRBC BLD-RTO: 0 PER 100 WBC
PLATELET # BLD AUTO: 193 K/UL (ref 138–453)
PLATELET # BLD AUTO: 195 K/UL (ref 138–453)
PMV BLD AUTO: 11 FL (ref 8.1–13.5)
PMV BLD AUTO: 11.3 FL (ref 8.1–13.5)
POTASSIUM SERPL-SCNC: 4.5 MMOL/L (ref 3.7–5.3)
POTASSIUM SERPL-SCNC: 4.6 MMOL/L (ref 3.7–5.3)
PROT SERPL-MCNC: 7.2 G/DL (ref 6.6–8.7)
PROT SERPL-MCNC: 7.2 G/DL (ref 6.6–8.7)
PROTHROMBIN TIME: 10.3 SEC (ref 9.4–12.6)
PROTHROMBIN TIME: 10.3 SEC (ref 9.4–12.6)
RBC # BLD AUTO: 4.62 M/UL (ref 3.95–5.11)
RBC # BLD AUTO: 4.68 M/UL (ref 3.95–5.11)
SODIUM SERPL-SCNC: 139 MMOL/L (ref 136–145)
SODIUM SERPL-SCNC: 140 MMOL/L (ref 136–145)
WBC OTHER # BLD: 10 K/UL (ref 3.5–11.3)
WBC OTHER # BLD: 10.1 K/UL (ref 3.5–11.3)

## 2025-07-30 PROCEDURE — 80048 BASIC METABOLIC PNL TOTAL CA: CPT

## 2025-07-30 PROCEDURE — 85027 COMPLETE CBC AUTOMATED: CPT

## 2025-07-30 PROCEDURE — 82248 BILIRUBIN DIRECT: CPT

## 2025-07-30 PROCEDURE — 80053 COMPREHEN METABOLIC PANEL: CPT

## 2025-07-30 PROCEDURE — 36415 COLL VENOUS BLD VENIPUNCTURE: CPT

## 2025-07-30 PROCEDURE — 82105 ALPHA-FETOPROTEIN SERUM: CPT

## 2025-07-30 PROCEDURE — 85025 COMPLETE CBC W/AUTO DIFF WBC: CPT

## 2025-07-30 PROCEDURE — 80076 HEPATIC FUNCTION PANEL: CPT

## 2025-07-30 PROCEDURE — 85610 PROTHROMBIN TIME: CPT

## 2025-08-01 ENCOUNTER — HOSPITAL ENCOUNTER (OUTPATIENT)
Dept: LAB | Facility: CLINIC | Age: 55
Setting detail: SPECIMEN
Discharge: HOME | End: 2025-08-01
Payer: COMMERCIAL

## 2025-08-01 PROCEDURE — 87205 SMEAR GRAM STAIN: CPT

## 2025-08-01 PROCEDURE — 87070 CULTURE OTHR SPECIMN AEROBIC: CPT

## 2025-08-02 LAB
MICROORGANISM SPEC CULT: NORMAL
MICROORGANISM/AGENT SPEC: NORMAL
SPECIMEN DESCRIPTION: NORMAL

## 2025-08-04 ENCOUNTER — HOSPITAL ENCOUNTER (OUTPATIENT)
Dept: ULTRASOUND IMAGING | Facility: CLINIC | Age: 55
Discharge: HOME | End: 2025-08-06
Payer: COMMERCIAL

## 2025-08-04 DIAGNOSIS — K74.60 CIRRHOSIS OF LIVER WITHOUT ASCITES, UNSPECIFIED HEPATIC CIRRHOSIS TYPE (HCC): ICD-10-CM

## 2025-08-04 DIAGNOSIS — D64.9 ANEMIA, UNSPECIFIED TYPE: ICD-10-CM

## 2025-08-04 PROCEDURE — 76705 ECHO EXAM OF ABDOMEN: CPT

## 2025-08-06 ENCOUNTER — HOSPITAL ENCOUNTER (OUTPATIENT)
Dept: CT IMAGING | Facility: CLINIC | Age: 55
Discharge: HOME | End: 2025-08-08
Payer: COMMERCIAL

## 2025-08-06 DIAGNOSIS — J18.9 UNRESOLVED PNEUMONIA: ICD-10-CM

## 2025-08-06 PROCEDURE — 71250 CT THORAX DX C-: CPT

## 2025-08-07 ENCOUNTER — TELEPHONE (OUTPATIENT)
Dept: ORTHOPEDIC SURGERY | Age: 55
End: 2025-08-07

## 2025-08-11 ENCOUNTER — TELEPHONE (OUTPATIENT)
Dept: FAMILY MEDICINE CLINIC | Age: 55
End: 2025-08-11

## 2025-08-27 ENCOUNTER — OFFICE VISIT (OUTPATIENT)
Dept: ORTHOPEDIC SURGERY | Age: 55
End: 2025-08-27

## 2025-08-27 VITALS — BODY MASS INDEX: 24.8 KG/M2 | HEIGHT: 63 IN | WEIGHT: 140 LBS

## 2025-08-27 DIAGNOSIS — G89.29 CHRONIC PAIN OF RIGHT KNEE: Primary | ICD-10-CM

## 2025-08-27 DIAGNOSIS — M25.561 CHRONIC PAIN OF RIGHT KNEE: Primary | ICD-10-CM

## 2025-08-27 DIAGNOSIS — M17.11 PRIMARY OSTEOARTHRITIS OF RIGHT KNEE: ICD-10-CM

## 2025-08-27 RX ORDER — METHYLPREDNISOLONE 4 MG/1
TABLET ORAL
COMMUNITY

## 2025-08-27 RX ORDER — METHYLPREDNISOLONE ACETATE 80 MG/ML
80 INJECTION, SUSPENSION INTRA-ARTICULAR; INTRALESIONAL; INTRAMUSCULAR; SOFT TISSUE ONCE
Status: COMPLETED | OUTPATIENT
Start: 2025-08-27 | End: 2025-08-27

## 2025-08-27 RX ORDER — BUPIVACAINE HYDROCHLORIDE 2.5 MG/ML
2 INJECTION, SOLUTION INFILTRATION; PERINEURAL ONCE
Status: COMPLETED | OUTPATIENT
Start: 2025-08-27 | End: 2025-08-27

## 2025-08-27 RX ADMIN — METHYLPREDNISOLONE ACETATE 80 MG: 80 INJECTION, SUSPENSION INTRA-ARTICULAR; INTRALESIONAL; INTRAMUSCULAR; SOFT TISSUE at 11:50

## 2025-08-27 RX ADMIN — BUPIVACAINE HYDROCHLORIDE 5 MG: 2.5 INJECTION, SOLUTION INFILTRATION; PERINEURAL at 11:50

## (undated) DEVICE — STRAP,POSITIONING,KNEE/BODY,FOAM,4X60": Brand: MEDLINE

## (undated) DEVICE — GUIDEWIRE 35/260/FC/PTFE/3J: Brand: GUIDEWIRE

## (undated) DEVICE — GLOVE SURG SZ 85 L12IN FNGR THK13MIL BRN LTX SYN POLYMER W

## (undated) DEVICE — STOCKINETTE,IMPERVIOUS,12X48,STERILE: Brand: MEDLINE

## (undated) DEVICE — STRAP POS MP 30X3 IN HK LOOP CLOSURE FOAM DISP

## (undated) DEVICE — BNDG,ELSTC,MATRIX,STRL,2"X5YD,LF,HOOK&LP: Brand: MEDLINE

## (undated) DEVICE — BAND COMPR L24CM REG CLR PLAS HEMSTAT EXT HK AND LOOP RETEN

## (undated) DEVICE — SOLUTION IV IRRIG POUR BRL 0.9% SODIUM CHL 2F7124

## (undated) DEVICE — 1010 S-DRAPE TOWEL DRAPE 10/BX: Brand: STERI-DRAPE™

## (undated) DEVICE — SINGLE PORT MANIFOLD: Brand: NEPTUNE 2

## (undated) DEVICE — DRAPE,ARTHRO,W/POUCH,STERILE: Brand: MEDLINE

## (undated) DEVICE — TRAY SURG CUST CRD CATH TOLEDO

## (undated) DEVICE — Z INACTIVE USE 2653177 SPONGE GZ W2XL2IN NONWOVEN 4 PLY FASTER WICKING ABIL AVANT

## (undated) DEVICE — BLADE SHV L13CM DIA4MM EXCALIBUR AGG COOLCUT

## (undated) DEVICE — GLOVE ORANGE PI 7 1/2   MSG9075

## (undated) DEVICE — SYRINGE, LUER LOCK, 10ML: Brand: MEDLINE

## (undated) DEVICE — DRAPE,U/ SHT,SPLIT,PLAS,STERIL: Brand: MEDLINE

## (undated) DEVICE — PADDING UNDERCAST W6INXL4YD WYTEX 6 PER BG

## (undated) DEVICE — GOWN,SIRUS,NONRNF,SETINSLV,XL,20/CS: Brand: MEDLINE

## (undated) DEVICE — GOWN,AURORA,NONREINFORCED,LARGE: Brand: MEDLINE

## (undated) DEVICE — GLOVE ORANGE PI 7   MSG9070

## (undated) DEVICE — TUBING PUMP QUAD LTX ARTHSCP SET

## (undated) DEVICE — DRESSING TRNSPAR W2XL2.75IN FLM SHT SEMIPERMEABLE WIND

## (undated) DEVICE — BANDAGE,ELASTIC,ESMARK,STERILE,6"X9',LF: Brand: MEDLINE

## (undated) DEVICE — CHLORAPREP 26ML ORANGE

## (undated) DEVICE — GLOVE SURG SZ 8 L12IN FNGR THK13MIL BRN LTX SYN POLYMER W

## (undated) DEVICE — CORD,CAUTERY,BIPOLAR,STERILE: Brand: MEDLINE

## (undated) DEVICE — ANGIOGRAPHIC CATHETER: Brand: EXPO™

## (undated) DEVICE — SUTURE ETHLN SZ 3-0 L18IN NONABSORBABLE BLK FS-1 L24MM 3/8 663H

## (undated) DEVICE — SOLUTION IRRIG 3000ML LAC RINGERS ARTHROMTC PLAS CONT

## (undated) DEVICE — PACK ARTHRO W PCH

## (undated) DEVICE — Device

## (undated) DEVICE — ZIMMER® STERILE DISPOSABLE TOURNIQUET CUFF WITH PLC, DUAL PORT, SINGLE BLADDER, 30 IN. (76 CM)

## (undated) DEVICE — GAUZE,SPONGE,FLUFF,6"X6.75",STRL,5/TRAY: Brand: MEDLINE

## (undated) DEVICE — ZIMMER® STERILE DISPOSABLE TOURNIQUET CUFF WITH PLC, DUAL PORT, SINGLE BLADDER, 18 IN. (46 CM)

## (undated) DEVICE — MHPB ARTHROSCOPY PACK: Brand: MEDLINE INDUSTRIES, INC.

## (undated) DEVICE — BANDAGE COMPR M W6INXL10YD WHT BGE VELC E MTRX HK AND LOOP

## (undated) DEVICE — BANDAGE GZ W2XL75IN ST RAYON POLY CNFRM STRTCH LTWT

## (undated) DEVICE — DRESSING,GAUZE,XEROFORM,CURAD,1"X8",ST: Brand: CURAD